# Patient Record
Sex: MALE | Race: WHITE | Employment: OTHER | URBAN - NONMETROPOLITAN AREA
[De-identification: names, ages, dates, MRNs, and addresses within clinical notes are randomized per-mention and may not be internally consistent; named-entity substitution may affect disease eponyms.]

---

## 2019-08-26 RX ORDER — TAMSULOSIN HYDROCHLORIDE 0.4 MG/1
1 CAPSULE ORAL DAILY
COMMUNITY
Start: 2015-07-13 | End: 2019-08-26 | Stop reason: SDUPTHER

## 2019-08-26 RX ORDER — TAMSULOSIN HYDROCHLORIDE 0.4 MG/1
0.4 CAPSULE ORAL DAILY
Qty: 30 CAPSULE | Refills: 1 | Status: SHIPPED | OUTPATIENT
Start: 2019-08-26 | End: 2019-09-24 | Stop reason: SDUPTHER

## 2019-09-19 RX ORDER — MELOXICAM 15 MG/1
TABLET ORAL
Qty: 14 TABLET | Refills: 0 | Status: SHIPPED | OUTPATIENT
Start: 2019-09-19 | End: 2019-09-24 | Stop reason: SDUPTHER

## 2019-09-24 ENCOUNTER — OFFICE VISIT (OUTPATIENT)
Dept: FAMILY MEDICINE CLINIC | Age: 69
End: 2019-09-24
Payer: MEDICARE

## 2019-09-24 ENCOUNTER — HOSPITAL ENCOUNTER (OUTPATIENT)
Age: 69
Discharge: HOME | End: 2019-09-26
Payer: MEDICARE

## 2019-09-24 VITALS
OXYGEN SATURATION: 99 % | DIASTOLIC BLOOD PRESSURE: 72 MMHG | SYSTOLIC BLOOD PRESSURE: 128 MMHG | HEART RATE: 80 BPM | HEIGHT: 70 IN | WEIGHT: 225 LBS | BODY MASS INDEX: 32.21 KG/M2

## 2019-09-24 DIAGNOSIS — Z91.81 AT HIGH RISK FOR FALLS: ICD-10-CM

## 2019-09-24 DIAGNOSIS — Z12.5 SCREENING FOR PROSTATE CANCER: ICD-10-CM

## 2019-09-24 DIAGNOSIS — Z12.11 SCREEN FOR COLON CANCER: ICD-10-CM

## 2019-09-24 DIAGNOSIS — N40.1 BENIGN PROSTATIC HYPERPLASIA WITH URINARY FREQUENCY: ICD-10-CM

## 2019-09-24 DIAGNOSIS — R35.0 BENIGN PROSTATIC HYPERPLASIA WITH URINARY FREQUENCY: ICD-10-CM

## 2019-09-24 DIAGNOSIS — Z23 IMMUNIZATION DUE: ICD-10-CM

## 2019-09-24 DIAGNOSIS — M19.279 SECONDARY LOCALIZED OSTEOARTHROSIS OF ANKLE AND FOOT, UNSPECIFIED LATERALITY: Primary | ICD-10-CM

## 2019-09-24 DIAGNOSIS — E78.49 OTHER HYPERLIPIDEMIA: ICD-10-CM

## 2019-09-24 DIAGNOSIS — M25.519 ARTHRALGIA OF SHOULDER, UNSPECIFIED LATERALITY: ICD-10-CM

## 2019-09-24 DIAGNOSIS — M19.279 SECONDARY LOCALIZED OSTEOARTHROSIS OF ANKLE AND FOOT, UNSPECIFIED LATERALITY: ICD-10-CM

## 2019-09-24 PROBLEM — B35.1 ONYCHOMYCOSIS: Status: ACTIVE | Noted: 2018-07-09

## 2019-09-24 PROBLEM — R26.2 DISABILITY OF WALKING: Status: ACTIVE | Noted: 2017-05-04

## 2019-09-24 PROBLEM — M79.609 PAIN IN LIMB: Status: ACTIVE | Noted: 2018-07-09

## 2019-09-24 PROBLEM — M25.579 PAIN IN JOINT INVOLVING ANKLE AND FOOT: Status: ACTIVE | Noted: 2017-05-04

## 2019-09-24 PROBLEM — I73.9 PERIPHERAL VASCULAR DISEASE (HCC): Status: ACTIVE | Noted: 2018-07-09

## 2019-09-24 LAB
ALBUMIN SERPL-MCNC: 4.5 G/DL (ref 3.5–5.2)
ALP BLD-CCNC: 81 U/L (ref 40–129)
ALT SERPL-CCNC: 16 U/L (ref 0–40)
ANION GAP SERPL CALCULATED.3IONS-SCNC: 15 MMOL/L (ref 7–16)
AST SERPL-CCNC: 17 U/L (ref 0–39)
BASOPHILS ABSOLUTE: 0.02 E9/L (ref 0–0.2)
BASOPHILS RELATIVE PERCENT: 0.3 % (ref 0–2)
BILIRUB SERPL-MCNC: 0.7 MG/DL (ref 0–1.2)
BILIRUBIN, POC: NORMAL
BLOOD URINE, POC: NORMAL
BUN BLDV-MCNC: 17 MG/DL (ref 8–23)
CALCIUM SERPL-MCNC: 9.5 MG/DL (ref 8.6–10.2)
CHLORIDE BLD-SCNC: 102 MMOL/L (ref 98–107)
CHOLESTEROL, TOTAL: 240 MG/DL (ref 0–199)
CLARITY, POC: CLEAR
CO2: 26 MMOL/L (ref 22–29)
COLOR, POC: NORMAL
CREAT SERPL-MCNC: 0.8 MG/DL (ref 0.7–1.2)
EOSINOPHILS ABSOLUTE: 0.06 E9/L (ref 0.05–0.5)
EOSINOPHILS RELATIVE PERCENT: 0.8 % (ref 0–6)
GFR AFRICAN AMERICAN: >60
GFR NON-AFRICAN AMERICAN: >60 ML/MIN/1.73
GLUCOSE BLD-MCNC: 119 MG/DL (ref 74–99)
GLUCOSE URINE, POC: NORMAL
HCT VFR BLD CALC: 45.5 % (ref 37–54)
HDLC SERPL-MCNC: 52 MG/DL
HEMOGLOBIN: 15 G/DL (ref 12.5–16.5)
IMMATURE GRANULOCYTES #: 0.02 E9/L
IMMATURE GRANULOCYTES %: 0.3 % (ref 0–5)
KETONES, POC: NORMAL
LDL CHOLESTEROL CALCULATED: 162 MG/DL (ref 0–99)
LEUKOCYTE EST, POC: NORMAL
LYMPHOCYTES ABSOLUTE: 1.5 E9/L (ref 1.5–4)
LYMPHOCYTES RELATIVE PERCENT: 20.7 % (ref 20–42)
MCH RBC QN AUTO: 29.8 PG (ref 26–35)
MCHC RBC AUTO-ENTMCNC: 33 % (ref 32–34.5)
MCV RBC AUTO: 90.3 FL (ref 80–99.9)
MONOCYTES ABSOLUTE: 0.39 E9/L (ref 0.1–0.95)
MONOCYTES RELATIVE PERCENT: 5.4 % (ref 2–12)
NEUTROPHILS ABSOLUTE: 5.27 E9/L (ref 1.8–7.3)
NEUTROPHILS RELATIVE PERCENT: 72.5 % (ref 43–80)
NITRITE, POC: NORMAL
PDW BLD-RTO: 12.4 FL (ref 11.5–15)
PH, POC: 5
PLATELET # BLD: 266 E9/L (ref 130–450)
PMV BLD AUTO: 10 FL (ref 7–12)
POTASSIUM SERPL-SCNC: 4 MMOL/L (ref 3.5–5)
PROSTATE SPECIFIC ANTIGEN: 2.26 NG/ML (ref 0–4)
PROTEIN, POC: NORMAL
RBC # BLD: 5.04 E12/L (ref 3.8–5.8)
SODIUM BLD-SCNC: 143 MMOL/L (ref 132–146)
SPECIFIC GRAVITY, POC: 1.02
TOTAL PROTEIN: 7.7 G/DL (ref 6.4–8.3)
TRIGL SERPL-MCNC: 128 MG/DL (ref 0–149)
TSH SERPL DL<=0.05 MIU/L-ACNC: 1.33 UIU/ML (ref 0.27–4.2)
URIC ACID, SERUM: 6.4 MG/DL (ref 3.4–7)
UROBILINOGEN, POC: NORMAL
VLDLC SERPL CALC-MCNC: 26 MG/DL
WBC # BLD: 7.3 E9/L (ref 4.5–11.5)

## 2019-09-24 PROCEDURE — G0103 PSA SCREENING: HCPCS

## 2019-09-24 PROCEDURE — 99214 OFFICE O/P EST MOD 30 MIN: CPT | Performed by: FAMILY MEDICINE

## 2019-09-24 PROCEDURE — 3017F COLORECTAL CA SCREEN DOC REV: CPT | Performed by: FAMILY MEDICINE

## 2019-09-24 PROCEDURE — 85025 COMPLETE CBC W/AUTO DIFF WBC: CPT

## 2019-09-24 PROCEDURE — 4004F PT TOBACCO SCREEN RCVD TLK: CPT | Performed by: FAMILY MEDICINE

## 2019-09-24 PROCEDURE — 80053 COMPREHEN METABOLIC PANEL: CPT

## 2019-09-24 PROCEDURE — 36415 COLL VENOUS BLD VENIPUNCTURE: CPT

## 2019-09-24 PROCEDURE — 4040F PNEUMOC VAC/ADMIN/RCVD: CPT | Performed by: FAMILY MEDICINE

## 2019-09-24 PROCEDURE — G8417 CALC BMI ABV UP PARAM F/U: HCPCS | Performed by: FAMILY MEDICINE

## 2019-09-24 PROCEDURE — G0009 ADMIN PNEUMOCOCCAL VACCINE: HCPCS | Performed by: FAMILY MEDICINE

## 2019-09-24 PROCEDURE — 80061 LIPID PANEL: CPT

## 2019-09-24 PROCEDURE — 84443 ASSAY THYROID STIM HORMONE: CPT

## 2019-09-24 PROCEDURE — G8427 DOCREV CUR MEDS BY ELIG CLIN: HCPCS | Performed by: FAMILY MEDICINE

## 2019-09-24 PROCEDURE — 1123F ACP DISCUSS/DSCN MKR DOCD: CPT | Performed by: FAMILY MEDICINE

## 2019-09-24 PROCEDURE — 90670 PCV13 VACCINE IM: CPT | Performed by: FAMILY MEDICINE

## 2019-09-24 PROCEDURE — 84550 ASSAY OF BLOOD/URIC ACID: CPT

## 2019-09-24 PROCEDURE — 81002 URINALYSIS NONAUTO W/O SCOPE: CPT | Performed by: FAMILY MEDICINE

## 2019-09-24 RX ORDER — BUPROPION HYDROCHLORIDE 150 MG/1
150 TABLET, EXTENDED RELEASE ORAL 2 TIMES DAILY
COMMUNITY
End: 2021-01-12

## 2019-09-24 RX ORDER — AMPICILLIN TRIHYDRATE 250 MG
CAPSULE ORAL
COMMUNITY

## 2019-09-24 RX ORDER — ESCITALOPRAM OXALATE 20 MG/1
20 TABLET ORAL DAILY
COMMUNITY
End: 2021-01-12

## 2019-09-24 RX ORDER — MELOXICAM 15 MG/1
15 TABLET ORAL DAILY
Qty: 90 TABLET | Refills: 1 | Status: SHIPPED
Start: 2019-09-24 | End: 2020-04-02 | Stop reason: SDUPTHER

## 2019-09-24 RX ORDER — TAMSULOSIN HYDROCHLORIDE 0.4 MG/1
0.4 CAPSULE ORAL DAILY
Qty: 90 CAPSULE | Refills: 1 | Status: SHIPPED
Start: 2019-09-24 | End: 2020-04-02 | Stop reason: SDUPTHER

## 2019-09-24 ASSESSMENT — ENCOUNTER SYMPTOMS
CONSTIPATION: 0
VOMITING: 0
COUGH: 0
BLOOD IN STOOL: 0
ABDOMINAL PAIN: 0
SINUS PAIN: 0
EYE REDNESS: 0
TROUBLE SWALLOWING: 0
WHEEZING: 0
BACK PAIN: 0
DIARRHEA: 0
PHOTOPHOBIA: 0
SHORTNESS OF BREATH: 0
SORE THROAT: 0

## 2019-09-24 ASSESSMENT — PATIENT HEALTH QUESTIONNAIRE - PHQ9
SUM OF ALL RESPONSES TO PHQ QUESTIONS 1-9: 2
SUM OF ALL RESPONSES TO PHQ QUESTIONS 1-9: 2
2. FEELING DOWN, DEPRESSED OR HOPELESS: 1
1. LITTLE INTEREST OR PLEASURE IN DOING THINGS: 1
SUM OF ALL RESPONSES TO PHQ9 QUESTIONS 1 & 2: 2

## 2019-09-24 NOTE — PROGRESS NOTES
OFFICE NOTE    9/24/19  Name: Kaylene Pandya  MDS:[id number]   Sex:male   Age:69 y.o. SUBJECTIVE  Chief Complaint   Patient presents with    Anxiety    Fatigue       HPI comes in says he took Welbutrin for a mos and decided it was not helping him. Says he can do things outside but can't face his garage. Says [place] can't do much he feels homebound sometimes. Has been golfing some  Review of Systems   Constitutional: Positive for fatigue. Negative for appetite change, fever and unexpected weight change. HENT: Negative for congestion, ear pain, hearing loss, sinus pain, sore throat and trouble swallowing. Eyes: Negative for photophobia, redness and visual disturbance. Respiratory: Negative for cough, shortness of breath and wheezing. Cardiovascular: Negative for chest pain, palpitations and leg swelling. Gastrointestinal: Negative for abdominal pain, blood in stool, constipation, diarrhea and vomiting. Endocrine: Negative for cold intolerance, polydipsia and polyuria. Genitourinary: Positive for urgency. Negative for difficulty urinating, genital sores and hematuria. Some post void dribbling   Musculoskeletal: Positive for arthralgias. Negative for back pain and joint swelling. Skin: Negative for rash and wound. Allergic/Immunologic: Negative for food allergies. Neurological: Negative for dizziness, tremors, syncope and headaches. Hematological: Negative for adenopathy. Does not bruise/bleed easily. Psychiatric/Behavioral: Negative for agitation, dysphoric mood, hallucinations and sleep disturbance.             Current Outpatient Medications:     tamsulosin (FLOMAX) 0.4 MG capsule, Take 1 capsule by mouth daily, Disp: 90 capsule, Rfl: 1    meloxicam (MOBIC) 15 MG tablet, Take 1 tablet by mouth daily, Disp: 90 tablet, Rfl: 1    buPROPion (WELLBUTRIN SR) 150 MG extended release tablet, Take 150 mg by mouth 2 times daily, Disp: , Rfl:     albuterol sulfate (PROAIR RESPICLICK) 108 (90 Base) MCG/ACT aerosol powder inhalation, Inhale into the lungs, Disp: , Rfl:     escitalopram (LEXAPRO) 20 MG tablet, Take 20 mg by mouth daily, Disp: , Rfl:     Red Yeast Rice 600 MG CAPS, Take by mouth, Disp: , Rfl:     Multiple Vitamins-Minerals (CENTRUM SILVER 50+MEN) TABS, Take by mouth, Disp: , Rfl:   Allergies   Allergen Reactions    Atorvastatin Calcium Other (See Comments)     Leg cramps      Ezetimibe-Simvastatin Other (See Comments)     Leg pain/cramping       No past medical history on file. No past surgical history on file. No family history on file. Social History     Tobacco History     Smoking Status  Current Some Day Smoker Smoking Frequency  0 packs/day for 53 years (0 pk yrs) Smoking Tobacco Type  Cigars    Smokeless Tobacco Use  Never Used          Alcohol History     Alcohol Use Status  Not Asked          Drug Use     Drug Use Status  Not Asked          Sexual Activity     Sexually Active  Not Asked                OBJECTIVE  Vitals:    09/24/19 0931   BP: 128/72   Pulse: 80   SpO2: 99%   Weight: 225 lb (102.1 kg)   Height: 5' 9.5\" (1.765 m)        Body mass index is 32.75 kg/m².     Orders Placed This Encounter   Procedures    PREVNAR 13 IM (Pneumococcal conjugate vaccine 13-valent)    CBC Auto Differential     Standing Status:   Future     Number of Occurrences:   1     Standing Expiration Date:   9/24/2020    Comprehensive Metabolic Panel     Standing Status:   Future     Number of Occurrences:   1     Standing Expiration Date:   9/24/2020    Lipid Panel     Standing Status:   Future     Number of Occurrences:   1     Standing Expiration Date:   9/24/2020     Order Specific Question:   Is Patient Fasting?/# of Hours     Answer:   fasting    TSH without Reflex     Standing Status:   Future     Number of Occurrences:   1     Standing Expiration Date:   9/24/2020    PSA SCREENING     Standing Status:   Future     Number of Occurrences:   1     Standing Expiration Date: 9/24/2020    Uric Acid     Standing Status:   Future     Number of Occurrences:   1     Standing Expiration Date:   9/24/2020    POCT Urinalysis no Micro    POCT Fit Test     Standing Status:   Future     Standing Expiration Date:   9/24/2020        EXAM   Physical Exam   Constitutional: He is oriented to person, place, and time. He appears well-developed. overweight   HENT:   Right Ear: Tympanic membrane, external ear and ear canal normal.   Left Ear: Tympanic membrane, external ear and ear canal normal.   Nose: Nose normal.   Mouth/Throat: Oropharynx is clear and moist. No oropharyngeal exudate. Eyes: Pupils are equal, round, and reactive to light. Conjunctivae are normal. No scleral icterus. Neck: Trachea normal. Neck supple. No JVD present. No thyroid mass and no thyromegaly present. Cardiovascular: Normal rate, regular rhythm, normal heart sounds and intact distal pulses. Exam reveals no gallop. No murmur heard. Pulmonary/Chest: Effort normal and breath sounds normal. He has no wheezes. He has no rales. He exhibits no tenderness. Abdominal: Soft. Bowel sounds are normal. He exhibits no distension and no mass. There is no tenderness. There is no guarding. No hernia. Musculoskeletal: Normal range of motion. He exhibits no tenderness. Some OA in knees   Lymphadenopathy:     He has no cervical adenopathy. Neurological: He is alert and oriented to person, place, and time. He exhibits normal muscle tone. Skin: Skin is warm and dry. Capillary refill takes less than 2 seconds. No rash noted. Psychiatric: He has a normal mood and affect. His behavior is normal.   Vitals reviewed. [facility] was seen today for anxiety and fatigue. Diagnoses and all orders for this visit:    Secondary localized osteoarthrosis of ankle and foot, unspecified laterality  -     meloxicam (MOBIC) 15 MG tablet;  Take 1 tablet by mouth daily  -     CBC Auto Differential; Future  -     Comprehensive Metabolic Panel;

## 2020-01-16 ENCOUNTER — OFFICE VISIT (OUTPATIENT)
Dept: FAMILY MEDICINE CLINIC | Age: 70
End: 2020-01-16
Payer: MEDICARE

## 2020-01-16 VITALS
BODY MASS INDEX: 34.27 KG/M2 | SYSTOLIC BLOOD PRESSURE: 126 MMHG | OXYGEN SATURATION: 97 % | HEART RATE: 66 BPM | TEMPERATURE: 97.4 F | HEIGHT: 69 IN | WEIGHT: 231.4 LBS | DIASTOLIC BLOOD PRESSURE: 78 MMHG

## 2020-01-16 PROCEDURE — G8417 CALC BMI ABV UP PARAM F/U: HCPCS | Performed by: FAMILY MEDICINE

## 2020-01-16 PROCEDURE — 1123F ACP DISCUSS/DSCN MKR DOCD: CPT | Performed by: FAMILY MEDICINE

## 2020-01-16 PROCEDURE — 17000 DESTRUCT PREMALG LESION: CPT | Performed by: FAMILY MEDICINE

## 2020-01-16 PROCEDURE — 4040F PNEUMOC VAC/ADMIN/RCVD: CPT | Performed by: FAMILY MEDICINE

## 2020-01-16 PROCEDURE — 3017F COLORECTAL CA SCREEN DOC REV: CPT | Performed by: FAMILY MEDICINE

## 2020-01-16 PROCEDURE — 99214 OFFICE O/P EST MOD 30 MIN: CPT | Performed by: FAMILY MEDICINE

## 2020-01-16 PROCEDURE — G8427 DOCREV CUR MEDS BY ELIG CLIN: HCPCS | Performed by: FAMILY MEDICINE

## 2020-01-16 PROCEDURE — G8484 FLU IMMUNIZE NO ADMIN: HCPCS | Performed by: FAMILY MEDICINE

## 2020-01-16 PROCEDURE — 4004F PT TOBACCO SCREEN RCVD TLK: CPT | Performed by: FAMILY MEDICINE

## 2020-01-16 ASSESSMENT — ENCOUNTER SYMPTOMS
COUGH: 0
NAUSEA: 0
PHOTOPHOBIA: 0
BLOOD IN STOOL: 0
EYE REDNESS: 0
SHORTNESS OF BREATH: 0
WHEEZING: 0
ABDOMINAL PAIN: 0

## 2020-01-16 ASSESSMENT — PATIENT HEALTH QUESTIONNAIRE - PHQ9
SUM OF ALL RESPONSES TO PHQ QUESTIONS 1-9: 0
SUM OF ALL RESPONSES TO PHQ QUESTIONS 1-9: 0
SUM OF ALL RESPONSES TO PHQ9 QUESTIONS 1 & 2: 0
1. LITTLE INTEREST OR PLEASURE IN DOING THINGS: 0
2. FEELING DOWN, DEPRESSED OR HOPELESS: 0

## 2020-01-16 NOTE — PROGRESS NOTES
No past medical history on file. No past surgical history on file. No family history on file. Social History     Tobacco History     Smoking Status  Current Some Day Smoker Smoking Frequency  0 packs/day for 53 years (0 pk yrs) Smoking Tobacco Type  Cigars    Smokeless Tobacco Use  Never Used          Alcohol History     Alcohol Use Status  Not Currently          Drug Use     Drug Use Status  Never          Sexual Activity     Sexually Active  Not Currently Partners  Female                OBJECTIVE  Vitals:    01/16/20 1427   BP: 126/78   Site: Left Upper Arm   Position: Sitting   Pulse: 66   Temp: 97.4 °F (36.3 °C)   TempSrc: Temporal   SpO2: 97%   Weight: 231 lb 6.4 oz (105 kg)   Height: 5' 9\" (1.753 m)        Body mass index is 34.17 kg/m². Orders Placed This Encounter   Procedures    [address] -  [facility]        EXAM   Physical Exam  Vitals signs and nursing note reviewed. Constitutional:       Appearance: Normal appearance. He is obese. HENT:      Right Ear: Tympanic membrane normal.      Left Ear: Tympanic membrane normal.      Nose: No congestion or rhinorrhea. Mouth/Throat:      Pharynx: Oropharynx is clear. No posterior oropharyngeal erythema. Eyes:      General: No scleral icterus. Conjunctiva/sclera: Conjunctivae normal.      Pupils: Pupils are equal, round, and reactive to light. Neck:      Musculoskeletal: Normal range of motion. Vascular: No carotid bruit. Cardiovascular:      Rate and Rhythm: Normal rate. Pulses: Normal pulses. Heart sounds: No murmur. Pulmonary:      Effort: Pulmonary effort is normal.      Breath sounds: No wheezing or rhonchi. Abdominal:      General: Bowel sounds are normal.      Tenderness: There is no tenderness. Hernia: No hernia is present. Musculoskeletal:         General: No swelling or tenderness. Right lower leg: No edema. Left lower leg: No edema.    Lymphadenopathy:      Cervical: No cervical adenopathy. Skin:     Capillary Refill: Capillary refill takes less than 2 seconds. Coloration: Skin is not jaundiced. Findings: Lesion present. No bruising or rash. Comments: AK upper left back (1) and left lateral chest (2)   Neurological:      General: No focal deficit present. Mental Status: He is alert. Psychiatric:         Mood and Affect: Mood normal.           Tammie De La Garza was seen today for pre-op exam.    Diagnoses and all orders for this visit:    Actinic keratoses  -     96365 - HI DESTRUC PREMALIGNANT, FIRST LESION  Cryo 3 lesions (AKs)with verucca-freeze  Preop general physical exam  Reviewed labs and EKG cleared medically for planned cataract surgery        No follow-ups on file.     Electronically signed by Kassandra Perez MD on 1/16/20 at 3:11 PM

## 2020-04-02 RX ORDER — TAMSULOSIN HYDROCHLORIDE 0.4 MG/1
0.4 CAPSULE ORAL DAILY
Qty: 90 CAPSULE | Refills: 1 | Status: SHIPPED
Start: 2020-04-02 | End: 2020-09-15 | Stop reason: SDUPTHER

## 2020-04-02 RX ORDER — MELOXICAM 15 MG/1
15 TABLET ORAL DAILY
Qty: 90 TABLET | Refills: 1 | Status: SHIPPED
Start: 2020-04-02 | End: 2020-08-11

## 2020-05-12 ENCOUNTER — VIRTUAL VISIT (OUTPATIENT)
Dept: FAMILY MEDICINE CLINIC | Age: 70
End: 2020-05-12
Payer: MEDICARE

## 2020-05-12 PROCEDURE — 99442 PR PHYS/QHP TELEPHONE EVALUATION 11-20 MIN: CPT | Performed by: FAMILY MEDICINE

## 2020-05-12 RX ORDER — ALBUTEROL SULFATE 90 UG/1
2 AEROSOL, METERED RESPIRATORY (INHALATION) 4 TIMES DAILY PRN
Qty: 1 INHALER | Refills: 5 | Status: SHIPPED
Start: 2020-05-12 | End: 2021-05-11

## 2020-05-21 ENCOUNTER — OFFICE VISIT (OUTPATIENT)
Dept: FAMILY MEDICINE CLINIC | Age: 70
End: 2020-05-21
Payer: MEDICARE

## 2020-05-21 VITALS
TEMPERATURE: 98.3 F | HEART RATE: 104 BPM | DIASTOLIC BLOOD PRESSURE: 82 MMHG | OXYGEN SATURATION: 97 % | SYSTOLIC BLOOD PRESSURE: 138 MMHG

## 2020-05-21 PROBLEM — F41.9 ANXIETY: Status: ACTIVE | Noted: 2020-05-21

## 2020-05-21 PROCEDURE — G8427 DOCREV CUR MEDS BY ELIG CLIN: HCPCS | Performed by: FAMILY MEDICINE

## 2020-05-21 PROCEDURE — 20552 NJX 1/MLT TRIGGER POINT 1/2: CPT | Performed by: FAMILY MEDICINE

## 2020-05-21 PROCEDURE — 1123F ACP DISCUSS/DSCN MKR DOCD: CPT | Performed by: FAMILY MEDICINE

## 2020-05-21 PROCEDURE — 99213 OFFICE O/P EST LOW 20 MIN: CPT | Performed by: FAMILY MEDICINE

## 2020-05-21 PROCEDURE — 4004F PT TOBACCO SCREEN RCVD TLK: CPT | Performed by: FAMILY MEDICINE

## 2020-05-21 PROCEDURE — G8417 CALC BMI ABV UP PARAM F/U: HCPCS | Performed by: FAMILY MEDICINE

## 2020-05-21 PROCEDURE — 3017F COLORECTAL CA SCREEN DOC REV: CPT | Performed by: FAMILY MEDICINE

## 2020-05-21 PROCEDURE — 4040F PNEUMOC VAC/ADMIN/RCVD: CPT | Performed by: FAMILY MEDICINE

## 2020-05-21 RX ORDER — HYDROCODONE BITARTRATE AND ACETAMINOPHEN 5; 325 MG/1; MG/1
TABLET ORAL
COMMUNITY
Start: 2020-05-19 | End: 2020-06-08

## 2020-05-21 NOTE — PROGRESS NOTES
OFFICE NOTE    5/21/20  Name: David Henry  SMN:[id number]   Sex:male   Age:69 y.o. SUBJECTIVE  Chief Complaint   Patient presents with    Back Pain     [facility] 5/19, Dx Lumbar radiculopathy, given Norco and Flexeril, shooting into BLE       HPI  [place] something go when was lifting hanging plants. Does most of \"step-and fetch-it\" tasks at home due to wife's OA of knees. No falls or bowel or bladder incontinance    Review of Systems   Constitutional: Positive for fatigue. Negative for appetite change, fever and unexpected weight change. HENT: Negative for congestion, ear pain, hearing loss, sinus pain, sore throat and trouble swallowing. Eyes: Negative for photophobia, redness and visual disturbance. Respiratory: Negative for cough, shortness of breath and wheezing. Cardiovascular: Negative for chest pain, palpitations and leg swelling. Gastrointestinal: Negative for abdominal pain, blood in stool, constipation, diarrhea and vomiting. Endocrine: Negative for cold intolerance, polydipsia and polyuria. Genitourinary: Negative for difficulty urinating, genital sores, hematuria and urgency. Musculoskeletal: Positive for back pain and gait problem. Negative for arthralgias and joint swelling. Allergic/Immunologic: Negative for food allergies. Neurological: Negative for dizziness, tremors, syncope and headaches. Radicular pain bilaterally   Hematological: Negative for adenopathy. Does not bruise/bleed easily. Psychiatric/Behavioral: Negative for agitation, dysphoric mood, hallucinations and sleep disturbance. The patient is nervous/anxious.              Current Outpatient Medications:     HYDROcodone-acetaminophen (NORCO) 5-325 MG per tablet, , Disp: , Rfl:     albuterol sulfate HFA (VENTOLIN HFA) 108 (90 Base) MCG/ACT inhaler, Inhale 2 puffs into the lungs 4 times daily as needed for Wheezing, Disp: 1 Inhaler, Rfl: 5    meloxicam (MOBIC) 15 MG tablet, Take 1 tablet by mouth daily, Disp: 90 tablet, Rfl: 1    tamsulosin (FLOMAX) 0.4 MG capsule, Take 1 capsule by mouth daily, Disp: 90 capsule, Rfl: 1    buPROPion (WELLBUTRIN SR) 150 MG extended release tablet, Take 150 mg by mouth 2 times daily, Disp: , Rfl:     albuterol sulfate (PROAIR RESPICLICK) 916 (90 Base) MCG/ACT aerosol powder inhalation, Inhale into the lungs, Disp: , Rfl:     escitalopram (LEXAPRO) 20 MG tablet, Take 20 mg by mouth daily, Disp: , Rfl:     Red Yeast Rice 600 MG CAPS, Take by mouth, Disp: , Rfl:     Multiple Vitamins-Minerals (CENTRUM SILVER 50+MEN) TABS, Take by mouth, Disp: , Rfl:   Allergies   Allergen Reactions    Atorvastatin Calcium Other (See Comments)     Leg cramps      Ezetimibe-Simvastatin Other (See Comments)     Leg pain/cramping       No past medical history on file. No past surgical history on file. No family history on file. Social History     Tobacco History     Smoking Status  Current Some Day Smoker Smoking Frequency  0 packs/day for 53 years (0 pk yrs) Smoking Tobacco Type  Cigars    Smokeless Tobacco Use  Never Used          Alcohol History     Alcohol Use Status  Not Currently          Drug Use     Drug Use Status  Never          Sexual Activity     Sexually Active  Not Currently Partners  Female                OBJECTIVE  Vitals:    05/21/20 1540   BP: 138/82   Site: Left Upper Arm   Position: Sitting   Pulse: 104   Temp: 98.3 °F (36.8 °C)   TempSrc: Temporal   SpO2: 97%        There is no height or weight on file to calculate BMI. No orders of the defined types were placed in this encounter. EXAM   Physical Exam  Vitals signs and nursing note reviewed. Constitutional:       Appearance: Normal appearance. He is obese. HENT:      Right Ear: Tympanic membrane normal.      Left Ear: Tympanic membrane normal.      Mouth/Throat:      Pharynx: Oropharynx is clear. Eyes:      Conjunctiva/sclera: Conjunctivae normal.      Pupils: Pupils are equal, round, and reactive to light.

## 2020-05-22 RX ORDER — METHYLPREDNISOLONE ACETATE 40 MG/ML
40 INJECTION, SUSPENSION INTRA-ARTICULAR; INTRALESIONAL; INTRAMUSCULAR; SOFT TISSUE ONCE
Status: DISCONTINUED | OUTPATIENT
Start: 2020-05-23 | End: 2020-09-15

## 2020-05-22 RX ORDER — LIDOCAINE HYDROCHLORIDE 10 MG/ML
20 INJECTION, SOLUTION INFILTRATION; PERINEURAL ONCE
Status: DISCONTINUED | OUTPATIENT
Start: 2020-05-23 | End: 2020-09-15

## 2020-05-22 ASSESSMENT — ENCOUNTER SYMPTOMS
BACK PAIN: 1
CONSTIPATION: 0
SORE THROAT: 0
ABDOMINAL PAIN: 0
BLOOD IN STOOL: 0
PHOTOPHOBIA: 0
SINUS PAIN: 0
TROUBLE SWALLOWING: 0
DIARRHEA: 0
SHORTNESS OF BREATH: 0
EYE REDNESS: 0
COUGH: 0
WHEEZING: 0
VOMITING: 0

## 2020-06-01 ENCOUNTER — TELEPHONE (OUTPATIENT)
Dept: FAMILY MEDICINE CLINIC | Age: 70
End: 2020-06-01

## 2020-06-01 RX ORDER — OXYCODONE HYDROCHLORIDE AND ACETAMINOPHEN 5; 325 MG/1; MG/1
TABLET ORAL
COMMUNITY
Start: 2020-05-26 | End: 2020-06-08

## 2020-06-01 NOTE — TELEPHONE ENCOUNTER
Patient calling in, continues with back pain. Out of flexeril. Asking for pain medication to be sent in. Has percocet from ER visit last week as well. Went to ER x2, saw you and had SI injection all in one week. Please advise.

## 2020-06-08 ENCOUNTER — OFFICE VISIT (OUTPATIENT)
Dept: FAMILY MEDICINE CLINIC | Age: 70
End: 2020-06-08
Payer: MEDICARE

## 2020-06-08 VITALS
DIASTOLIC BLOOD PRESSURE: 94 MMHG | SYSTOLIC BLOOD PRESSURE: 166 MMHG | OXYGEN SATURATION: 97 % | TEMPERATURE: 97.6 F | HEART RATE: 126 BPM

## 2020-06-08 PROCEDURE — G8427 DOCREV CUR MEDS BY ELIG CLIN: HCPCS | Performed by: FAMILY MEDICINE

## 2020-06-08 PROCEDURE — G8417 CALC BMI ABV UP PARAM F/U: HCPCS | Performed by: FAMILY MEDICINE

## 2020-06-08 PROCEDURE — 3017F COLORECTAL CA SCREEN DOC REV: CPT | Performed by: FAMILY MEDICINE

## 2020-06-08 PROCEDURE — 4004F PT TOBACCO SCREEN RCVD TLK: CPT | Performed by: FAMILY MEDICINE

## 2020-06-08 PROCEDURE — 4040F PNEUMOC VAC/ADMIN/RCVD: CPT | Performed by: FAMILY MEDICINE

## 2020-06-08 PROCEDURE — 99213 OFFICE O/P EST LOW 20 MIN: CPT | Performed by: FAMILY MEDICINE

## 2020-06-08 PROCEDURE — 1123F ACP DISCUSS/DSCN MKR DOCD: CPT | Performed by: FAMILY MEDICINE

## 2020-06-08 RX ORDER — LORAZEPAM 1 MG/1
TABLET ORAL
Qty: 4 TABLET | Refills: 0 | Status: SHIPPED | OUTPATIENT
Start: 2020-06-08 | End: 2020-07-08

## 2020-06-25 ENCOUNTER — OFFICE VISIT (OUTPATIENT)
Dept: FAMILY MEDICINE CLINIC | Age: 70
End: 2020-06-25
Payer: MEDICARE

## 2020-06-25 VITALS
HEART RATE: 95 BPM | SYSTOLIC BLOOD PRESSURE: 162 MMHG | OXYGEN SATURATION: 97 % | TEMPERATURE: 97.7 F | DIASTOLIC BLOOD PRESSURE: 92 MMHG

## 2020-06-25 PROCEDURE — G8417 CALC BMI ABV UP PARAM F/U: HCPCS | Performed by: FAMILY MEDICINE

## 2020-06-25 PROCEDURE — 4004F PT TOBACCO SCREEN RCVD TLK: CPT | Performed by: FAMILY MEDICINE

## 2020-06-25 PROCEDURE — 4040F PNEUMOC VAC/ADMIN/RCVD: CPT | Performed by: FAMILY MEDICINE

## 2020-06-25 PROCEDURE — 3017F COLORECTAL CA SCREEN DOC REV: CPT | Performed by: FAMILY MEDICINE

## 2020-06-25 PROCEDURE — G8427 DOCREV CUR MEDS BY ELIG CLIN: HCPCS | Performed by: FAMILY MEDICINE

## 2020-06-25 PROCEDURE — 99213 OFFICE O/P EST LOW 20 MIN: CPT | Performed by: FAMILY MEDICINE

## 2020-06-25 PROCEDURE — 1123F ACP DISCUSS/DSCN MKR DOCD: CPT | Performed by: FAMILY MEDICINE

## 2020-06-25 ASSESSMENT — ENCOUNTER SYMPTOMS
SINUS PAIN: 0
SORE THROAT: 0
COUGH: 0
VOMITING: 0
PHOTOPHOBIA: 0
ABDOMINAL PAIN: 0
SHORTNESS OF BREATH: 0
BLOOD IN STOOL: 0
BACK PAIN: 1
WHEEZING: 0
CONSTIPATION: 0
EYE REDNESS: 0
DIARRHEA: 0
TROUBLE SWALLOWING: 0

## 2020-06-25 NOTE — PROGRESS NOTES
OFFICE NOTE    6/25/20  Name: Kirstin Clark  WXX:[birth year]   Sex:male   Age:69 y.o. SUBJECTIVE  Chief Complaint   Patient presents with    Results     MRI       Patient presents to discuss MRI results and treatment plan. Came in to discuss MRI. Says he finally went golfing. Has LBP and leg pain/fatigue if walks far or stands for more than 15 minuts      Review of Systems   Constitutional: Positive for fatigue. Negative for appetite change, fever and unexpected weight change. HENT: Negative for congestion, ear pain, hearing loss, sinus pain, sore throat and trouble swallowing. Eyes: Negative for photophobia, redness and visual disturbance. Respiratory: Negative for cough, shortness of breath and wheezing. Cardiovascular: Negative for chest pain, palpitations and leg swelling. Gastrointestinal: Negative for abdominal pain, blood in stool, constipation, diarrhea and vomiting. Endocrine: Negative for cold intolerance, polydipsia and polyuria. Genitourinary: Negative for difficulty urinating, genital sores, hematuria and urgency. Musculoskeletal: Positive for back pain and gait problem. Negative for arthralgias and joint swelling. Allergic/Immunologic: Negative for food allergies. Neurological: Negative for dizziness, tremors, seizures, syncope, numbness and headaches. Hematological: Negative for adenopathy. Does not bruise/bleed easily. Psychiatric/Behavioral: Negative for agitation, dysphoric mood, hallucinations and sleep disturbance.             Current Outpatient Medications:     LORazepam (ATIVAN) 1 MG tablet, 1 tab 1 hour before procedure like MRI, Disp: 4 tablet, Rfl: 0    albuterol sulfate HFA (VENTOLIN HFA) 108 (90 Base) MCG/ACT inhaler, Inhale 2 puffs into the lungs 4 times daily as needed for Wheezing, Disp: 1 Inhaler, Rfl: 5    meloxicam (MOBIC) 15 MG tablet, Take 1 tablet by mouth daily, Disp: 90 tablet, Rfl: 1    tamsulosin (FLOMAX) 0.4 MG capsule, Take 1 capsule

## 2020-06-29 ENCOUNTER — TELEPHONE (OUTPATIENT)
Dept: FAMILY MEDICINE CLINIC | Age: 70
End: 2020-06-29

## 2020-08-11 ENCOUNTER — OFFICE VISIT (OUTPATIENT)
Dept: FAMILY MEDICINE CLINIC | Age: 70
End: 2020-08-11
Payer: MEDICARE

## 2020-08-11 VITALS
WEIGHT: 230.4 LBS | OXYGEN SATURATION: 98 % | BODY MASS INDEX: 34.02 KG/M2 | DIASTOLIC BLOOD PRESSURE: 82 MMHG | TEMPERATURE: 97.7 F | HEART RATE: 90 BPM | SYSTOLIC BLOOD PRESSURE: 128 MMHG

## 2020-08-11 PROCEDURE — 3017F COLORECTAL CA SCREEN DOC REV: CPT | Performed by: FAMILY MEDICINE

## 2020-08-11 PROCEDURE — 4004F PT TOBACCO SCREEN RCVD TLK: CPT | Performed by: FAMILY MEDICINE

## 2020-08-11 PROCEDURE — 1123F ACP DISCUSS/DSCN MKR DOCD: CPT | Performed by: FAMILY MEDICINE

## 2020-08-11 PROCEDURE — 4040F PNEUMOC VAC/ADMIN/RCVD: CPT | Performed by: FAMILY MEDICINE

## 2020-08-11 PROCEDURE — G8427 DOCREV CUR MEDS BY ELIG CLIN: HCPCS | Performed by: FAMILY MEDICINE

## 2020-08-11 PROCEDURE — G8417 CALC BMI ABV UP PARAM F/U: HCPCS | Performed by: FAMILY MEDICINE

## 2020-08-11 PROCEDURE — 99214 OFFICE O/P EST MOD 30 MIN: CPT | Performed by: FAMILY MEDICINE

## 2020-08-11 RX ORDER — NAPROXEN 500 MG/1
500 TABLET ORAL 2 TIMES DAILY WITH MEALS
Qty: 60 TABLET | Refills: 3 | Status: SHIPPED
Start: 2020-08-11 | End: 2021-03-30

## 2020-08-11 RX ORDER — TIZANIDINE 4 MG/1
4 TABLET ORAL NIGHTLY PRN
Qty: 10 TABLET | Refills: 2 | Status: SHIPPED | OUTPATIENT
Start: 2020-08-11 | End: 2020-08-31

## 2020-08-11 RX ORDER — IBUPROFEN 400 MG/1
400 TABLET ORAL EVERY 6 HOURS PRN
COMMUNITY

## 2020-08-11 ASSESSMENT — ENCOUNTER SYMPTOMS
ABDOMINAL PAIN: 0
TROUBLE SWALLOWING: 0
WHEEZING: 0
CONSTIPATION: 0
COUGH: 0
EYE REDNESS: 0
SORE THROAT: 0
PHOTOPHOBIA: 0
BLOOD IN STOOL: 0
SINUS PAIN: 0
DIARRHEA: 0
SHORTNESS OF BREATH: 0
VOMITING: 0
BACK PAIN: 1

## 2020-08-11 NOTE — PROGRESS NOTES
OFFICE NOTE    8/11/20  Name: Parvez Rahcel  VRJ:[birth year]   Sex:male   Age:69 y.o. SUBJECTIVE  Chief Complaint   Patient presents with    Back Pain       Patient presents for continued back pain. Has been going to [employer] with no relief. Switched from mobic to OTC ibuprofen, was suggested he try Aleve.   taking 2 tabs probably TID, no muscle relaxant. Says got very little relief from initial set of shots. Reluctant to take 2nd set. Left knee and left ankle also problematic. Continues to golf, tires 13th hole. Wife handicapped        Review of Systems   Constitutional: Positive for fatigue. Negative for appetite change, fever and unexpected weight change. HENT: Negative for congestion, ear pain, hearing loss, sinus pain, sore throat and trouble swallowing. Eyes: Negative for photophobia, redness and visual disturbance. Respiratory: Negative for cough, shortness of breath and wheezing. Cardiovascular: Negative for chest pain, palpitations and leg swelling. Gastrointestinal: Negative for abdominal pain, blood in stool, constipation, diarrhea and vomiting. Endocrine: Negative for cold intolerance, polydipsia and polyuria. Genitourinary: Negative for difficulty urinating, genital sores, hematuria and urgency. Says tamsulosin seems to help him   Musculoskeletal: Positive for arthralgias, back pain and gait problem. Negative for joint swelling. Skin: Negative for rash and wound. Allergic/Immunologic: Negative for food allergies. Neurological: Negative for dizziness, tremors, seizures, syncope, numbness and headaches. Hematological: Negative for adenopathy. Does not bruise/bleed easily. Psychiatric/Behavioral: Positive for dysphoric mood. Negative for agitation, hallucinations and sleep disturbance. The patient is nervous/anxious.              Current Outpatient Medications:     ibuprofen (ADVIL;MOTRIN) 400 MG tablet, Take 400 mg by mouth every 6 hours as needed for Pain, Disp: , Rfl:     naproxen (NAPROSYN) 500 MG tablet, Take 1 tablet by mouth 2 times daily (with meals), Disp: 60 tablet, Rfl: 3    tiZANidine (ZANAFLEX) 4 MG tablet, Take 1 tablet by mouth nightly as needed (low back pain) Knee or back pain, Disp: 10 tablet, Rfl: 2    albuterol sulfate HFA (VENTOLIN HFA) 108 (90 Base) MCG/ACT inhaler, Inhale 2 puffs into the lungs 4 times daily as needed for Wheezing, Disp: 1 Inhaler, Rfl: 5    tamsulosin (FLOMAX) 0.4 MG capsule, Take 1 capsule by mouth daily, Disp: 90 capsule, Rfl: 1    buPROPion (WELLBUTRIN SR) 150 MG extended release tablet, Take 150 mg by mouth 2 times daily, Disp: , Rfl:     albuterol sulfate (PROAIR RESPICLICK) 926 (90 Base) MCG/ACT aerosol powder inhalation, Inhale into the lungs, Disp: , Rfl:     escitalopram (LEXAPRO) 20 MG tablet, Take 20 mg by mouth daily, Disp: , Rfl:     Red Yeast Rice 600 MG CAPS, Take by mouth, Disp: , Rfl:     Multiple Vitamins-Minerals (CENTRUM SILVER 50+MEN) TABS, Take by mouth, Disp: , Rfl:   Allergies   Allergen Reactions    Atorvastatin Calcium Other (See Comments)     Leg cramps      Ezetimibe-Simvastatin Other (See Comments)     Leg pain/cramping       No past medical history on file. No past surgical history on file. No family history on file. Social History     Tobacco History     Smoking Status  Current Some Day Smoker Smoking Frequency  0 packs/day for 53 years (0 pk yrs) Smoking Tobacco Type  Cigars    Smokeless Tobacco Use  Never Used          Alcohol History     Alcohol Use Status  Not Currently          Drug Use     Drug Use Status  Never          Sexual Activity     Sexually Active  Not Currently Partners  Female              OBJECTIVE  Vitals:    08/11/20 1258   BP: 128/82   Site: Left Upper Arm   Position: Sitting   Pulse: 90   Temp: 97.7 °F (36.5 °C)   TempSrc: Temporal   SpO2: 98%   Weight: 230 lb 6.4 oz (104.5 kg)        Body mass index is 34.02 kg/m².     Patient is obese    Orders Placed This Encounter Procedures    Lipid Panel     Standing Status:   Future     Standing Expiration Date:   8/11/2021     Order Specific Question:   Is Patient Fasting?/# of Hours     Answer:   fasting    TSH without Reflex     Standing Status:   Future     Standing Expiration Date:   8/11/2021    Uric Acid     Standing Status:   Future     Standing Expiration Date:   8/11/2021    PSA SCREENING     Standing Status:   Future     Standing Expiration Date:   8/11/2021        EXAM   Physical Exam  Vitals signs and nursing note reviewed. Constitutional:       Appearance: Normal appearance. He is well-developed. He is obese. HENT:      Right Ear: Tympanic membrane, ear canal and external ear normal.      Left Ear: Tympanic membrane, ear canal and external ear normal.      Nose: Nose normal.      Mouth/Throat:      Pharynx: Oropharynx is clear. Eyes:      General: No scleral icterus. Conjunctiva/sclera: Conjunctivae normal.      Pupils: Pupils are equal, round, and reactive to light. Neck:      Musculoskeletal: Normal range of motion and neck supple. Thyroid: No thyroid mass or thyromegaly. Vascular: No carotid bruit or JVD. Trachea: Trachea normal.   Cardiovascular:      Rate and Rhythm: Normal rate and regular rhythm. Pulses: Normal pulses. Heart sounds: Normal heart sounds. No murmur. No gallop. Pulmonary:      Effort: Pulmonary effort is normal.      Breath sounds: Normal breath sounds. No wheezing, rhonchi or rales. Abdominal:      General: Bowel sounds are normal. There is no distension. Palpations: Abdomen is soft. There is no mass. Tenderness: There is no abdominal tenderness. There is no guarding. Musculoskeletal: Normal range of motion. Comments: Slightly stooped loss normal lumbar lordosis. Hips some OA not severe. TKR on right, severe OA left knee, left ankle seems normal mild venous insufficiency   Lymphadenopathy:      Cervical: No cervical adenopathy.    Skin: General: Skin is warm and dry. Coloration: Skin is not jaundiced. Findings: No bruising or rash. Neurological:      General: No focal deficit present. Mental Status: He is alert and oriented to person, place, and time. Motor: No abnormal muscle tone. Psychiatric:         Mood and Affect: Mood normal.         Behavior: Behavior normal.           Rosaura Chavez was seen today for back pain. Diagnoses and all orders for this visit:    Primary osteoarthritis involving multiple joints  -     naproxen (NAPROSYN) 500 MG tablet; Take 1 tablet by mouth 2 times daily (with meals)  -     tiZANidine (ZANAFLEX) 4 MG tablet; Take 1 tablet by mouth nightly as needed (low back pain) Knee or back pain  -     Uric Acid; Future primarily lumbar spine and knees. Left knee and ankle OA may be affecting low back. Will try this combination. Both Rosaura Chavez and I would like to avoid opiates if possible. Suggest he get 2nd set of shots    Screening for prostate cancer  -     PSA SCREENING; Future    Other hyperlipidemia  -     Lipid Panel; Future  -     TSH without Reflex; Future    Peripheral vascular disease (HCC)  PAD not suspected. Venous disease is present. Compression stockings might help. Wears brace left knee and sometimes ankle when he golfs          No follow-ups on file. Electronically signed by Brady Davis MD on 8/11/20 at 1:23 PM EDT    I have personally reviewed and updated the chief complaint, HPI, Past Medical, Family and Social History, as well as the above Review of Systems.

## 2020-09-15 ENCOUNTER — HOSPITAL ENCOUNTER (OUTPATIENT)
Age: 70
Discharge: HOME | End: 2020-09-17
Payer: MEDICARE

## 2020-09-15 ENCOUNTER — OFFICE VISIT (OUTPATIENT)
Dept: FAMILY MEDICINE CLINIC | Age: 70
End: 2020-09-15
Payer: MEDICARE

## 2020-09-15 VITALS
SYSTOLIC BLOOD PRESSURE: 118 MMHG | BODY MASS INDEX: 33.94 KG/M2 | TEMPERATURE: 98.2 F | HEART RATE: 94 BPM | OXYGEN SATURATION: 99 % | DIASTOLIC BLOOD PRESSURE: 72 MMHG | WEIGHT: 229.8 LBS

## 2020-09-15 LAB
CHOLESTEROL, TOTAL: 248 MG/DL (ref 0–199)
HBA1C MFR BLD: 5.3 % (ref 4–5.6)
HDLC SERPL-MCNC: 53 MG/DL
LDL CHOLESTEROL CALCULATED: 163 MG/DL (ref 0–99)
PROSTATE SPECIFIC ANTIGEN: 4.08 NG/ML (ref 0–4)
TRIGL SERPL-MCNC: 161 MG/DL (ref 0–149)
TSH SERPL DL<=0.05 MIU/L-ACNC: 1.66 UIU/ML (ref 0.27–4.2)
URIC ACID, SERUM: 5.6 MG/DL (ref 3.4–7)
VLDLC SERPL CALC-MCNC: 32 MG/DL

## 2020-09-15 PROCEDURE — 84550 ASSAY OF BLOOD/URIC ACID: CPT

## 2020-09-15 PROCEDURE — G0103 PSA SCREENING: HCPCS

## 2020-09-15 PROCEDURE — G8427 DOCREV CUR MEDS BY ELIG CLIN: HCPCS | Performed by: FAMILY MEDICINE

## 2020-09-15 PROCEDURE — 99214 OFFICE O/P EST MOD 30 MIN: CPT | Performed by: FAMILY MEDICINE

## 2020-09-15 PROCEDURE — 36415 COLL VENOUS BLD VENIPUNCTURE: CPT

## 2020-09-15 PROCEDURE — G8417 CALC BMI ABV UP PARAM F/U: HCPCS | Performed by: FAMILY MEDICINE

## 2020-09-15 PROCEDURE — 83036 HEMOGLOBIN GLYCOSYLATED A1C: CPT

## 2020-09-15 PROCEDURE — 3017F COLORECTAL CA SCREEN DOC REV: CPT | Performed by: FAMILY MEDICINE

## 2020-09-15 PROCEDURE — 20610 DRAIN/INJ JOINT/BURSA W/O US: CPT | Performed by: FAMILY MEDICINE

## 2020-09-15 PROCEDURE — 4004F PT TOBACCO SCREEN RCVD TLK: CPT | Performed by: FAMILY MEDICINE

## 2020-09-15 PROCEDURE — 86803 HEPATITIS C AB TEST: CPT

## 2020-09-15 PROCEDURE — 84443 ASSAY THYROID STIM HORMONE: CPT

## 2020-09-15 PROCEDURE — 1123F ACP DISCUSS/DSCN MKR DOCD: CPT | Performed by: FAMILY MEDICINE

## 2020-09-15 PROCEDURE — 80061 LIPID PANEL: CPT

## 2020-09-15 PROCEDURE — 4040F PNEUMOC VAC/ADMIN/RCVD: CPT | Performed by: FAMILY MEDICINE

## 2020-09-15 RX ORDER — TAMSULOSIN HYDROCHLORIDE 0.4 MG/1
0.4 CAPSULE ORAL DAILY
Qty: 90 CAPSULE | Refills: 1 | Status: SHIPPED | OUTPATIENT
Start: 2020-09-15 | End: 2021-01-12 | Stop reason: SDUPTHER

## 2020-09-15 RX ORDER — TAMSULOSIN HYDROCHLORIDE 0.4 MG/1
0.4 CAPSULE ORAL DAILY
Qty: 90 CAPSULE | Refills: 1 | Status: CANCELLED | OUTPATIENT
Start: 2020-09-15

## 2020-09-15 RX ORDER — LIDOCAINE HYDROCHLORIDE 10 MG/ML
20 INJECTION, SOLUTION INFILTRATION; PERINEURAL ONCE
Status: COMPLETED | OUTPATIENT
Start: 2020-09-15 | End: 2020-09-15

## 2020-09-15 RX ORDER — METHYLPREDNISOLONE ACETATE 40 MG/ML
40 INJECTION, SUSPENSION INTRA-ARTICULAR; INTRALESIONAL; INTRAMUSCULAR; SOFT TISSUE ONCE
Status: COMPLETED | OUTPATIENT
Start: 2020-09-15 | End: 2020-09-15

## 2020-09-15 RX ADMIN — METHYLPREDNISOLONE ACETATE 40 MG: 40 INJECTION, SUSPENSION INTRA-ARTICULAR; INTRALESIONAL; INTRAMUSCULAR; SOFT TISSUE at 14:57

## 2020-09-15 RX ADMIN — LIDOCAINE HYDROCHLORIDE 20 ML: 10 INJECTION, SOLUTION INFILTRATION; PERINEURAL at 14:56

## 2020-09-15 ASSESSMENT — ENCOUNTER SYMPTOMS
CONSTIPATION: 0
BLOOD IN STOOL: 0
ABDOMINAL PAIN: 0
WHEEZING: 0
COUGH: 0
VOMITING: 0
PHOTOPHOBIA: 0
DIARRHEA: 0
TROUBLE SWALLOWING: 0
SHORTNESS OF BREATH: 0
SINUS PAIN: 0
SORE THROAT: 0
BACK PAIN: 0
EYE REDNESS: 0

## 2020-09-15 NOTE — PROGRESS NOTES
OFFICE NOTE    9/15/20  Name: Tai Mcintyre  LVF:[id number]   Sex:male   Age:70 y.o. SUBJECTIVE  Chief Complaint   Patient presents with    Knee Pain     left       Patient presents for left knee pain. States he attempted to wear his knee brace which has cause more pain. finds golfing which he loves difficult. Review of Systems   Constitutional: Negative for appetite change, fever and unexpected weight change. HENT: Negative for congestion, ear pain, hearing loss, sinus pain, sore throat and trouble swallowing. Eyes: Negative for photophobia, redness and visual disturbance. Respiratory: Negative for cough, shortness of breath and wheezing. Cardiovascular: Negative for chest pain, palpitations and leg swelling. Gastrointestinal: Negative for abdominal pain, blood in stool, constipation, diarrhea and vomiting. Endocrine: Negative for cold intolerance, polydipsia and polyuria. Genitourinary: Negative for difficulty urinating, genital sores, hematuria and urgency. Musculoskeletal: Positive for arthralgias and gait problem. Negative for back pain and joint swelling. Allergic/Immunologic: Negative for food allergies. Neurological: Negative for dizziness, tremors, syncope and headaches. Hematological: Negative for adenopathy. Does not bruise/bleed easily. Psychiatric/Behavioral: Negative for agitation, dysphoric mood, hallucinations and sleep disturbance.             Current Outpatient Medications:     tamsulosin (FLOMAX) 0.4 MG capsule, Take 1 capsule by mouth daily, Disp: 90 capsule, Rfl: 1    ibuprofen (ADVIL;MOTRIN) 400 MG tablet, Take 400 mg by mouth every 6 hours as needed for Pain, Disp: , Rfl:     naproxen (NAPROSYN) 500 MG tablet, Take 1 tablet by mouth 2 times daily (with meals), Disp: 60 tablet, Rfl: 3    albuterol sulfate HFA (VENTOLIN HFA) 108 (90 Base) MCG/ACT inhaler, Inhale 2 puffs into the lungs 4 times daily as needed for Wheezing, Disp: 1 Inhaler, Rfl: 5   Occurrences:   1     Standing Expiration Date:   9/15/2021    Hemoglobin A1C     Standing Status:   Future     Number of Occurrences:   1     Standing Expiration Date:   9/15/2021    Hepatitis C Antibody     Standing Status:   Future     Number of Occurrences:   1     Standing Expiration Date:   9/15/2021    External Referral To Orthopedic Surgery     Referral Priority:   Routine     Referral Type:   Eval and Treat     Referral Reason:   Specialty Services Required     Referred to Provider:   Miguel Hernandez DO     Requested Specialty:   Orthopedic Surgery     Number of Visits Requested:   1    [place] ARTHROCENTESIS ASPIR&/INJ MAJOR JT/BURSA W/O US        EXAM   Physical Exam  Vitals signs and nursing note reviewed. Constitutional:       Appearance: Normal appearance. He is well-developed. HENT:      Right Ear: Tympanic membrane, ear canal and external ear normal.      Left Ear: Tympanic membrane, ear canal and external ear normal.      Nose: Nose normal.      Mouth/Throat:      Pharynx: Oropharynx is clear. No posterior oropharyngeal erythema. Eyes:      General: No scleral icterus. Conjunctiva/sclera: Conjunctivae normal.      Pupils: Pupils are equal, round, and reactive to light. Neck:      Musculoskeletal: Normal range of motion and neck supple. Thyroid: No thyroid mass or thyromegaly. Vascular: No carotid bruit or JVD. Trachea: Trachea normal.   Cardiovascular:      Rate and Rhythm: Normal rate and regular rhythm. Heart sounds: Normal heart sounds. No murmur. No gallop. Pulmonary:      Effort: Pulmonary effort is normal.      Breath sounds: Normal breath sounds. No wheezing, rhonchi or rales. Abdominal:      General: Bowel sounds are normal. There is no distension. Palpations: Abdomen is soft. There is no mass. Tenderness: There is no abdominal tenderness. There is no guarding. Hernia: No hernia is present. Musculoskeletal: Normal range of motion. General: Tenderness present. No swelling. Right lower leg: Edema present. Left lower leg: Edema present. Comments: TKR on right. Left knee crepitant. Possible suprapatellar bursitis   Lymphadenopathy:      Cervical: No cervical adenopathy. Skin:     General: Skin is warm and dry. Coloration: Skin is not jaundiced. Findings: No bruising or rash. Neurological:      General: No focal deficit present. Mental Status: He is alert and oriented to person, place, and time. Sensory: No sensory deficit. Motor: No weakness or abnormal muscle tone. Coordination: Coordination normal.      Gait: Gait abnormal.   Psychiatric:         Mood and Affect: Mood normal.         Behavior: Behavior normal.           Sofia Garcia was seen today for knee pain. Diagnoses and all orders for this visit:    Other hyperlipidemia  -     Lipid Panel; Future  -     TSH without Reflex; Future  Diet regulated at this time  Screening for prostate cancer  -     PSA SCREENING; Future    Hyperuricemia  -     Uric Acid; Future    Hyperglycemia  -     Cancel: POCT glycosylated hemoglobin (Hb A1C)  -     [place] ARTHROCENTESIS ASPIR&/INJ MAJOR JT/BURSA W/O US    Primary osteoarthritis of left knee  -     methylPREDNISolone acetate (DEPO-MEDROL) injection 40 mg  -     lidocaine 1 % injection 20 mL  -     External Referral To Orthopedic Surgery  Area prepped with betadine and ETOH, flexed 90 degrees and fixed to floor. Injected anterolateral approach uneventfully. Area dressed with bandaid. Impaired fasting glucose  -     Hemoglobin A1C; Future    Encounter for hepatitis C screening test for low risk patient  -     Hepatitis C Antibody; Future    Benign prostatic hyperplasia with urinary frequency  -     tamsulosin (FLOMAX) 0.4 MG capsule; Take 1 capsule by mouth daily    Primary osteoarthritis involving multiple joints          Return in about 4 months (around 1/15/2021).     Electronically signed by Mitzy Godinez MD on 9/15/20 at 2:27 PM EDT    I have personally reviewed and updated the chief complaint, HPI, Past Medical, Family and Social History, as well as the above Review of Systems.

## 2020-09-16 ENCOUNTER — TELEPHONE (OUTPATIENT)
Dept: FAMILY MEDICINE CLINIC | Age: 70
End: 2020-09-16

## 2020-09-16 LAB — HEPATITIS C ANTIBODY INTERPRETATION: NORMAL

## 2020-11-04 ENCOUNTER — TELEPHONE (OUTPATIENT)
Dept: FAMILY MEDICINE CLINIC | Age: 70
End: 2020-11-04

## 2020-11-04 NOTE — TELEPHONE ENCOUNTER
Spoke with pt in regards to overdue [employer]. States he never received a kit. Will mail out to pt. Verified address with pt.

## 2021-01-12 ENCOUNTER — OFFICE VISIT (OUTPATIENT)
Dept: FAMILY MEDICINE CLINIC | Age: 71
End: 2021-01-12
Payer: MEDICARE

## 2021-01-12 VITALS
DIASTOLIC BLOOD PRESSURE: 72 MMHG | HEART RATE: 85 BPM | OXYGEN SATURATION: 97 % | WEIGHT: 238 LBS | SYSTOLIC BLOOD PRESSURE: 128 MMHG | HEIGHT: 69 IN | BODY MASS INDEX: 35.25 KG/M2

## 2021-01-12 DIAGNOSIS — E78.49 OTHER HYPERLIPIDEMIA: ICD-10-CM

## 2021-01-12 DIAGNOSIS — R97.20 ELEVATED PSA: Primary | ICD-10-CM

## 2021-01-12 DIAGNOSIS — N40.1 BENIGN PROSTATIC HYPERPLASIA WITH URINARY FREQUENCY: ICD-10-CM

## 2021-01-12 DIAGNOSIS — R35.0 BENIGN PROSTATIC HYPERPLASIA WITH URINARY FREQUENCY: ICD-10-CM

## 2021-01-12 DIAGNOSIS — R97.20 ELEVATED PSA: ICD-10-CM

## 2021-01-12 PROCEDURE — 4004F PT TOBACCO SCREEN RCVD TLK: CPT | Performed by: FAMILY MEDICINE

## 2021-01-12 PROCEDURE — 1123F ACP DISCUSS/DSCN MKR DOCD: CPT | Performed by: FAMILY MEDICINE

## 2021-01-12 PROCEDURE — 3017F COLORECTAL CA SCREEN DOC REV: CPT | Performed by: FAMILY MEDICINE

## 2021-01-12 PROCEDURE — 99214 OFFICE O/P EST MOD 30 MIN: CPT | Performed by: FAMILY MEDICINE

## 2021-01-12 PROCEDURE — 4040F PNEUMOC VAC/ADMIN/RCVD: CPT | Performed by: FAMILY MEDICINE

## 2021-01-12 PROCEDURE — 93000 ELECTROCARDIOGRAM COMPLETE: CPT | Performed by: FAMILY MEDICINE

## 2021-01-12 PROCEDURE — G8417 CALC BMI ABV UP PARAM F/U: HCPCS | Performed by: FAMILY MEDICINE

## 2021-01-12 PROCEDURE — G8427 DOCREV CUR MEDS BY ELIG CLIN: HCPCS | Performed by: FAMILY MEDICINE

## 2021-01-12 PROCEDURE — G8484 FLU IMMUNIZE NO ADMIN: HCPCS | Performed by: FAMILY MEDICINE

## 2021-01-12 RX ORDER — TAMSULOSIN HYDROCHLORIDE 0.4 MG/1
0.4 CAPSULE ORAL DAILY
Qty: 90 CAPSULE | Refills: 1 | Status: SHIPPED | OUTPATIENT
Start: 2021-01-12 | End: 2021-04-26 | Stop reason: SDUPTHER

## 2021-01-12 ASSESSMENT — ENCOUNTER SYMPTOMS
WHEEZING: 0
EYE REDNESS: 0
PHOTOPHOBIA: 0
ABDOMINAL PAIN: 0
SINUS PAIN: 0
COUGH: 0
BACK PAIN: 0
SORE THROAT: 0
VOMITING: 0
DIARRHEA: 0
BLOOD IN STOOL: 0
CONSTIPATION: 0
TROUBLE SWALLOWING: 0
SHORTNESS OF BREATH: 0

## 2021-01-12 NOTE — PROGRESS NOTES
OFFICE NOTE    1/12/21  Name: Polo Goldberg  SKN:[id number]   Sex:male   Age:70 y.o. SUBJECTIVE  Chief Complaint   Patient presents with    Hyperlipidemia     4 mo f/u       HPI Admitted to me he is not taking any of his antidepressants, \"they don't work for [employer]"  C/o tiredness, lack of ambition. Says sleeps in same room with his wife and she does not think he has MARIO. Review of Systems   Constitutional: Positive for activity change and fatigue. Negative for appetite change, fever and unexpected weight change. HENT: Negative for congestion, ear pain, hearing loss, sinus pain, sore throat and trouble swallowing. Eyes: Negative for photophobia, redness and visual disturbance. Respiratory: Negative for cough, shortness of breath and wheezing. Cardiovascular: Negative for chest pain, palpitations and leg swelling. Gastrointestinal: Negative for abdominal pain, blood in stool, constipation, diarrhea and vomiting. Endocrine: Negative for cold intolerance, polydipsia and polyuria. Genitourinary: Positive for urgency. Negative for difficulty urinating, genital sores and hematuria. Musculoskeletal: Positive for arthralgias. Negative for back pain and joint swelling. Skin: Negative for pallor and rash. Allergic/Immunologic: Negative for food allergies. Neurological: Negative for dizziness, tremors, seizures, syncope, numbness and headaches. Hematological: Negative for adenopathy. Does not bruise/bleed easily. Psychiatric/Behavioral: Negative for agitation, dysphoric mood, hallucinations and sleep disturbance. The patient is nervous/anxious. All other systems reviewed and are negative.            Current Outpatient Medications:     tamsulosin (FLOMAX) 0.4 MG capsule, Take 1 capsule by mouth daily, Disp: 90 capsule, Rfl: 1    ibuprofen (ADVIL;MOTRIN) 400 MG tablet, Take 400 mg by mouth every 6 hours as needed for Pain, Disp: , Rfl:   albuterol sulfate HFA (VENTOLIN HFA) 108 (90 Base) MCG/ACT inhaler, Inhale 2 puffs into the lungs 4 times daily as needed for Wheezing, Disp: 1 Inhaler, Rfl: 5    albuterol sulfate (PROAIR RESPICLICK) 749 (90 Base) MCG/ACT aerosol powder inhalation, Inhale into the lungs, Disp: , Rfl:     Multiple Vitamins-Minerals (CENTRUM SILVER 50+MEN) TABS, Take by mouth, Disp: , Rfl:     naproxen (NAPROSYN) 500 MG tablet, Take 1 tablet by mouth 2 times daily (with meals) (Patient not taking: Reported on 1/12/2021), Disp: 60 tablet, Rfl: 3    Red Yeast Rice 600 MG CAPS, Take by mouth, Disp: , Rfl:   Allergies   Allergen Reactions    Atorvastatin Calcium Other (See Comments)     Leg cramps      Ezetimibe-Simvastatin Other (See Comments)     Leg pain/cramping       No past medical history on file. No past surgical history on file. No family history on file. Social History     Tobacco History     Smoking Status  Current Some Day Smoker Smoking Frequency  0 packs/day for 53 years (0 pk yrs) Smoking Tobacco Type  Cigars    Smokeless Tobacco Use  Never Used          Alcohol History     Alcohol Use Status  Not Currently          Drug Use     Drug Use Status  Never          Sexual Activity     Sexually Active  Not Currently Partners  Female                OBJECTIVE  Vitals:    01/12/21 0929   BP: 128/72   Pulse: 85   SpO2: 97%   Weight: 238 lb (108 kg)   Height: 5' 9\" (1.753 m)        Body mass index is 35.15 kg/m². Orders Placed This Encounter   Procedures    PSA, TOTAL AND FREE     Standing Status:   Future     Standing Expiration Date:   1/12/2022    EKG 12 Lead     Standing Status:   Future     Standing Expiration Date:   1/12/2022     Order Specific Question:   Reason for Exam?     Answer: Other        EXAM   Physical Exam  Vitals signs and nursing note reviewed. Constitutional:       Appearance: Normal appearance. He is well-developed. He is obese.    HENT: Right Ear: Tympanic membrane, ear canal and external ear normal.      Left Ear: Tympanic membrane, ear canal and external ear normal.      Nose: Nose normal.      Mouth/Throat:      Pharynx: Oropharynx is clear. No posterior oropharyngeal erythema. Eyes:      General: No scleral icterus. Conjunctiva/sclera: Conjunctivae normal.      Pupils: Pupils are equal, round, and reactive to light. Neck:      Musculoskeletal: Normal range of motion and neck supple. Thyroid: No thyroid mass or thyromegaly. Vascular: No carotid bruit or JVD. Trachea: Trachea normal.   Cardiovascular:      Rate and Rhythm: Normal rate and regular rhythm. Pulses: Normal pulses. Heart sounds: Normal heart sounds. No murmur. No gallop. Pulmonary:      Effort: Pulmonary effort is normal.      Breath sounds: Normal breath sounds. No wheezing, rhonchi or rales. Abdominal:      General: Bowel sounds are normal. There is no distension. Palpations: Abdomen is soft. There is no mass. Tenderness: There is no abdominal tenderness. There is no guarding. Hernia: No hernia is present. Musculoskeletal: Normal range of motion. General: Tenderness present. No swelling. Right lower leg: No edema. Left lower leg: No edema. Lymphadenopathy:      Cervical: No cervical adenopathy. Skin:     General: Skin is warm and dry. Capillary Refill: Capillary refill takes less than 2 seconds. Coloration: Skin is not jaundiced. Findings: No bruising or rash. Neurological:      General: No focal deficit present. Mental Status: He is alert and oriented to person, place, and time. Sensory: No sensory deficit. Motor: No weakness or abnormal muscle tone. Coordination: Coordination normal.      Gait: Gait normal.   Psychiatric:         Mood and Affect: Mood normal.         Behavior: Behavior normal.           Cleve Lesch was seen today for hyperlipidemia. Diagnoses and all orders for this visit:    Elevated PSA  -     PSA, TOTAL AND FREE; Future  Significant bump in PSA last OV from more than a year previously  Benign prostatic hyperplasia with urinary frequency  -     tamsulosin (FLOMAX) 0.4 MG capsule; Take 1 capsule by mouth daily  Gets up a couple of times a night. Could add beta prostate to current meds  Other hyperlipidemia  -     EKG 12 Lead; Future  No acute changes since last EKG    Needs something to do besides sit at home with Kenzie Alvarado and wish he was doing something    No follow-ups on file.     Electronically signed by Michael Pennington MD on 1/12/21 at 10:37 AM EST

## 2021-01-15 LAB
PROSTATE SPECIFIC ANTIGEN FREE: 0.5 NG/ML
PROSTATE SPECIFIC ANTIGEN PERCENT FREE: 14 %
PROSTATE SPECIFIC ANTIGEN: 3.6 NG/ML (ref 0–4)

## 2021-01-18 ENCOUNTER — TELEPHONE (OUTPATIENT)
Dept: FAMILY MEDICINE CLINIC | Age: 71
End: 2021-01-18

## 2021-01-18 DIAGNOSIS — R97.20 PSA ELEVATION: Primary | ICD-10-CM

## 2021-01-19 NOTE — TELEPHONE ENCOUNTER
----- Message from Estephania Shafer sent at 1/18/2021  9:40 AM EST -----  Notified patient. Patient verbalized understanding.   Pt is in agreement with seeing a urologist.

## 2021-03-30 ENCOUNTER — OFFICE VISIT (OUTPATIENT)
Dept: FAMILY MEDICINE CLINIC | Age: 71
End: 2021-03-30
Payer: MEDICARE

## 2021-03-30 VITALS
OXYGEN SATURATION: 98 % | BODY MASS INDEX: 34.79 KG/M2 | DIASTOLIC BLOOD PRESSURE: 74 MMHG | TEMPERATURE: 97.7 F | HEART RATE: 87 BPM | WEIGHT: 235.6 LBS | SYSTOLIC BLOOD PRESSURE: 128 MMHG

## 2021-03-30 DIAGNOSIS — R35.0 BENIGN PROSTATIC HYPERPLASIA WITH URINARY FREQUENCY: Primary | ICD-10-CM

## 2021-03-30 DIAGNOSIS — R40.0 SOMNOLENCE: ICD-10-CM

## 2021-03-30 DIAGNOSIS — N40.1 BENIGN PROSTATIC HYPERPLASIA WITH URINARY FREQUENCY: Primary | ICD-10-CM

## 2021-03-30 PROCEDURE — 99213 OFFICE O/P EST LOW 20 MIN: CPT | Performed by: FAMILY MEDICINE

## 2021-03-30 PROCEDURE — 4004F PT TOBACCO SCREEN RCVD TLK: CPT | Performed by: FAMILY MEDICINE

## 2021-03-30 PROCEDURE — G8427 DOCREV CUR MEDS BY ELIG CLIN: HCPCS | Performed by: FAMILY MEDICINE

## 2021-03-30 PROCEDURE — G8417 CALC BMI ABV UP PARAM F/U: HCPCS | Performed by: FAMILY MEDICINE

## 2021-03-30 PROCEDURE — 1123F ACP DISCUSS/DSCN MKR DOCD: CPT | Performed by: FAMILY MEDICINE

## 2021-03-30 PROCEDURE — 3017F COLORECTAL CA SCREEN DOC REV: CPT | Performed by: FAMILY MEDICINE

## 2021-03-30 PROCEDURE — 4040F PNEUMOC VAC/ADMIN/RCVD: CPT | Performed by: FAMILY MEDICINE

## 2021-03-30 PROCEDURE — G8484 FLU IMMUNIZE NO ADMIN: HCPCS | Performed by: FAMILY MEDICINE

## 2021-03-30 RX ORDER — MODAFINIL 200 MG/1
200 TABLET ORAL DAILY
Qty: 30 TABLET | Refills: 3 | Status: SHIPPED | OUTPATIENT
Start: 2021-03-30 | End: 2021-04-29

## 2021-03-30 ASSESSMENT — ENCOUNTER SYMPTOMS
COUGH: 0
SHORTNESS OF BREATH: 0
SINUS PAIN: 0
BLOOD IN STOOL: 0
PHOTOPHOBIA: 0
CONSTIPATION: 0
VOMITING: 0
TROUBLE SWALLOWING: 0
WHEEZING: 0
BACK PAIN: 0
EYE REDNESS: 0
SORE THROAT: 0
ABDOMINAL PAIN: 0
DIARRHEA: 0

## 2021-03-30 ASSESSMENT — PATIENT HEALTH QUESTIONNAIRE - PHQ9
SUM OF ALL RESPONSES TO PHQ9 QUESTIONS 1 & 2: 0
SUM OF ALL RESPONSES TO PHQ QUESTIONS 1-9: 0
2. FEELING DOWN, DEPRESSED OR HOPELESS: 0
SUM OF ALL RESPONSES TO PHQ QUESTIONS 1-9: 0

## 2021-03-31 NOTE — PROGRESS NOTES
OFFICE NOTE    3/30/21  Name: Jeffy Moreno  D:[birth year]   Sex:male   Age:70 y.o. SUBJECTIVE  Chief Complaint   Patient presents with    Advice Only     supposed to have biopsy 4/14 due to PSA    Fatigue       HPI reluctant to have TRUS Biopsy of prostate due to request he stop his Motrin for 7-10 days prior to procedure. Continues to c/o of lack of energy, motivation, desire to do things. Antidepressants have not helped him in past    Review of Systems   Constitutional: Positive for fatigue. Negative for appetite change, fever and unexpected weight change. HENT: Negative for congestion, ear pain, hearing loss, sinus pain, sore throat and trouble swallowing. Eyes: Negative for photophobia, redness and visual disturbance. Respiratory: Negative for cough, shortness of breath and wheezing. Cardiovascular: Negative for chest pain, palpitations and leg swelling. Gastrointestinal: Negative for abdominal pain, blood in stool, constipation, diarrhea and vomiting. Endocrine: Negative for cold intolerance, polydipsia and polyuria. Genitourinary: Positive for urgency. Negative for difficulty urinating, genital sores and hematuria. Musculoskeletal: Positive for arthralgias. Negative for back pain and joint swelling. Allergic/Immunologic: Negative for food allergies. Neurological: Negative for dizziness, tremors, seizures, syncope, numbness and headaches. Hematological: Negative for adenopathy. Does not bruise/bleed easily. Psychiatric/Behavioral: Negative for agitation, dysphoric mood, hallucinations and sleep disturbance. The patient is nervous/anxious. All other systems reviewed and are negative. Current Outpatient Medications:     modafinil (PROVIGIL) 200 MG tablet, Take 1 tablet by mouth daily for 30 days. , Disp: 30 tablet, Rfl: 3    tamsulosin (FLOMAX) 0.4 MG capsule, Take 1 capsule by mouth daily, Disp: 90 capsule, Rfl: 1    ibuprofen (ADVIL;MOTRIN) 400 MG tablet, Take 400 mg by mouth every 6 hours as needed for Pain, Disp: , Rfl:     albuterol sulfate HFA (VENTOLIN HFA) 108 (90 Base) MCG/ACT inhaler, Inhale 2 puffs into the lungs 4 times daily as needed for Wheezing, Disp: 1 Inhaler, Rfl: 5    albuterol sulfate (PROAIR RESPICLICK) 439 (90 Base) MCG/ACT aerosol powder inhalation, Inhale into the lungs, Disp: , Rfl:     Red Yeast Rice 600 MG CAPS, Take by mouth, Disp: , Rfl:     Multiple Vitamins-Minerals (CENTRUM SILVER 50+MEN) TABS, Take by mouth, Disp: , Rfl:   Allergies   Allergen Reactions    Atorvastatin Calcium Other (See Comments)     Leg cramps      Ezetimibe-Simvastatin Other (See Comments)     Leg pain/cramping       No past medical history on file. No past surgical history on file. No family history on file. Social History     Tobacco History     Smoking Status  Current Some Day Smoker Smoking Frequency  0 packs/day for 53 years (0 pk yrs) Smoking Tobacco Type  Cigars    Smokeless Tobacco Use  Never Used          Alcohol History     Alcohol Use Status  Not Currently          Drug Use     Drug Use Status  Never          Sexual Activity     Sexually Active  Not Currently Partners  Female                OBJECTIVE  Vitals:    03/30/21 1100   BP: 128/74   Site: Left Upper Arm   Position: Sitting   Pulse: 87   Temp: 97.7 °F (36.5 °C)   TempSrc: Temporal   SpO2: 98%   Weight: 235 lb 9.6 oz (106.9 kg)        Body mass index is 34.79 kg/m². No orders of the defined types were placed in this encounter. EXAM   Physical Exam  Vitals signs and nursing note reviewed. Constitutional:       Appearance: Normal appearance. He is well-developed. He is obese. HENT:      Right Ear: Tympanic membrane, ear canal and external ear normal.      Left Ear: Tympanic membrane, ear canal and external ear normal.      Nose: Nose normal. No rhinorrhea. Mouth/Throat:      Pharynx: Oropharynx is clear. No posterior oropharyngeal erythema.    Eyes:      General: No scleral icterus. Conjunctiva/sclera: Conjunctivae normal.   Neck:      Musculoskeletal: Normal range of motion and neck supple. Thyroid: No thyroid mass or thyromegaly. Vascular: No carotid bruit or JVD. Trachea: Trachea normal.   Cardiovascular:      Rate and Rhythm: Normal rate and regular rhythm. Pulses: Normal pulses. Heart sounds: Normal heart sounds. No murmur. No gallop. Pulmonary:      Effort: Pulmonary effort is normal.      Breath sounds: Normal breath sounds. No wheezing, rhonchi or rales. Abdominal:      General: Bowel sounds are normal. There is no distension. Palpations: Abdomen is soft. There is no mass. Tenderness: There is no abdominal tenderness. There is no guarding. Musculoskeletal: Normal range of motion. General: No swelling or tenderness. Right lower leg: No edema. Left lower leg: Edema present. Lymphadenopathy:      Cervical: No cervical adenopathy. Skin:     General: Skin is warm and dry. Capillary Refill: Capillary refill takes less than 2 seconds. Coloration: Skin is not jaundiced. Findings: No bruising or rash. Neurological:      General: No focal deficit present. Mental Status: He is alert and oriented to person, place, and time. Motor: No abnormal muscle tone. Psychiatric:         Behavior: Behavior normal.           Alejandro Elias was seen today for advice only and fatigue. Diagnoses and all orders for this visit:    Benign prostatic hyperplasia with urinary frequency  Discussed rationale for biopsy. Will have him come in and get methyl pred shot week before biopsy and hold Motrin then. Take Tylenol up to 6 500 mg tabs per day when off this  Somnolence  -     modafinil (PROVIGIL) 200 MG tablet; Take 1 tablet by mouth daily for 30 days. Have been hearing about this fatigue and lack of desire to do anything for years. Nothing has helped. Will try provigil    F/u 3 mos    No follow-ups on file. Electronically signed by Kathe Reeves MD on 3/30/21 at 10:20 PM EDT

## 2021-04-07 ENCOUNTER — TELEPHONE (OUTPATIENT)
Dept: ADMINISTRATIVE | Age: 71
End: 2021-04-07

## 2021-04-07 ENCOUNTER — NURSE ONLY (OUTPATIENT)
Dept: FAMILY MEDICINE CLINIC | Age: 71
End: 2021-04-07
Payer: MEDICARE

## 2021-04-07 DIAGNOSIS — M46.1 SACROILIITIS (HCC): Primary | ICD-10-CM

## 2021-04-07 PROCEDURE — 96372 THER/PROPH/DIAG INJ SC/IM: CPT | Performed by: FAMILY MEDICINE

## 2021-04-07 RX ORDER — METHYLPREDNISOLONE ACETATE 40 MG/ML
40 INJECTION, SUSPENSION INTRA-ARTICULAR; INTRALESIONAL; INTRAMUSCULAR; SOFT TISSUE ONCE
Status: COMPLETED | OUTPATIENT
Start: 2021-04-07 | End: 2021-04-07

## 2021-04-07 RX ADMIN — METHYLPREDNISOLONE ACETATE 40 MG: 40 INJECTION, SUSPENSION INTRA-ARTICULAR; INTRALESIONAL; INTRAMUSCULAR; SOFT TISSUE at 15:29

## 2021-04-07 NOTE — TELEPHONE ENCOUNTER
Pt's wife Noah Ahumada) called and requested to schedule pt this week with Dr. Carmela Sun, no availability. Pt does not want to go to [facility]. Pt's wife said pt has been seen in the past, was unable to see any past visits. He is having pain in his toes on his rt foot and said his nails need trimmed. Offered next available on 4/19, but Marcia Pizano said he needs seen sooner. Please contact Marcia Pizano.

## 2021-04-15 DIAGNOSIS — M79.671 RIGHT FOOT PAIN: Primary | ICD-10-CM

## 2021-04-16 ENCOUNTER — PROCEDURE VISIT (OUTPATIENT)
Dept: PODIATRY | Age: 71
End: 2021-04-16
Payer: MEDICARE

## 2021-04-16 VITALS — WEIGHT: 235 LBS | BODY MASS INDEX: 34.8 KG/M2 | HEIGHT: 69 IN

## 2021-04-16 DIAGNOSIS — R26.2 DIFFICULTY WALKING: ICD-10-CM

## 2021-04-16 DIAGNOSIS — M79.674 PAIN OF TOE OF RIGHT FOOT: ICD-10-CM

## 2021-04-16 DIAGNOSIS — I87.2 VENOUS INSUFFICIENCY (CHRONIC) (PERIPHERAL): ICD-10-CM

## 2021-04-16 DIAGNOSIS — M79.675 PAIN OF TOE OF LEFT FOOT: ICD-10-CM

## 2021-04-16 DIAGNOSIS — I73.9 PVD (PERIPHERAL VASCULAR DISEASE) (HCC): ICD-10-CM

## 2021-04-16 DIAGNOSIS — B35.1 ONYCHOMYCOSIS: Primary | ICD-10-CM

## 2021-04-16 PROCEDURE — 11721 DEBRIDE NAIL 6 OR MORE: CPT | Performed by: PODIATRIST

## 2021-04-16 NOTE — PROGRESS NOTES
Patient is in today for evaluation of right toe pain. Patient says when the socks rub and touch his toes, he gets a lot of pain. Patient also needs nail care to both feet.  pcp is Rodger Juan MD  Last ov 3/30/21

## 2021-04-16 NOTE — PROGRESS NOTES
21     [facility]    : 1950  Sex: male  Age: 69 y.o. Subjective: The patient is seen today for evaluation regarding foot evaluation and mycotic nail care. No other complaints noted. Chief Complaint   Patient presents with    Toe Pain     right foot       Current Medications:    Current Outpatient Medications:     modafinil (PROVIGIL) 200 MG tablet, Take 1 tablet by mouth daily for 30 days. , Disp: 30 tablet, Rfl: 3    tamsulosin (FLOMAX) 0.4 MG capsule, Take 1 capsule by mouth daily, Disp: 90 capsule, Rfl: 1    ibuprofen (ADVIL;MOTRIN) 400 MG tablet, Take 400 mg by mouth every 6 hours as needed for Pain, Disp: , Rfl:     albuterol sulfate HFA (VENTOLIN HFA) 108 (90 Base) MCG/ACT inhaler, Inhale 2 puffs into the lungs 4 times daily as needed for Wheezing, Disp: 1 Inhaler, Rfl: 5    albuterol sulfate (PROAIR RESPICLICK) 487 (90 Base) MCG/ACT aerosol powder inhalation, Inhale into the lungs, Disp: , Rfl:     Red Yeast Rice 600 MG CAPS, Take by mouth, Disp: , Rfl:     Multiple Vitamins-Minerals (CENTRUM SILVER 50+MEN) TABS, Take by mouth, Disp: , Rfl:     Allergies: Allergies   Allergen Reactions    Atorvastatin Calcium Other (See Comments)     Leg cramps      Ezetimibe-Simvastatin Other (See Comments)     Leg pain/cramping       No past surgical history on file. No past medical history on file. Vitals:    21 0922   Weight: 235 lb (106.6 kg)   Height: 5' 9\" (1.753 m)       Exam:  Neurovascular status unchanged. At this time the nail/s 1, 2, 3, 4, 5 right foot and nail/s 1, 2, 3, 4, 5 left foot are noted to be thickened, dystrophic and discolored with subungual debris present. Tenderness noted to palpation. Minimal hair growth is noted to both lower extremities. Edema noted with both varicosities and stasis skin changes present bilaterally. Coolness is noted to the digital regions to palpation. Capillary fill time delayed digital areas bilateral foot.   No heel

## 2021-04-22 NOTE — TELEPHONE ENCOUNTER
Leverette Homans calling in provigil cost $25 per month. He cannot pay that. Will [facility] a [facility] ?

## 2021-04-23 NOTE — TELEPHONE ENCOUNTER
Left message for patient to return call about medication.    FYI if he uses goodrx he can get it at [facility] for 18.01 for a 30 day supply

## 2021-04-23 NOTE — TELEPHONE ENCOUNTER
Patient returned call and said he will try and get the script back from [facility] and go to [facility]

## 2021-04-26 DIAGNOSIS — N40.1 BENIGN PROSTATIC HYPERPLASIA WITH URINARY FREQUENCY: ICD-10-CM

## 2021-04-26 DIAGNOSIS — R35.0 BENIGN PROSTATIC HYPERPLASIA WITH URINARY FREQUENCY: ICD-10-CM

## 2021-04-26 RX ORDER — TAMSULOSIN HYDROCHLORIDE 0.4 MG/1
0.4 CAPSULE ORAL DAILY
Qty: 90 CAPSULE | Refills: 1 | Status: SHIPPED
Start: 2021-04-26 | End: 2021-05-11 | Stop reason: SDUPTHER

## 2021-04-26 NOTE — TELEPHONE ENCOUNTER
Last Appointment:  3/30/2021  Future Appointments   Date Time Provider Dimple Cagle   5/11/2021  9:30 AM Milli Elena  [address] [place] [address]

## 2021-05-11 ENCOUNTER — OFFICE VISIT (OUTPATIENT)
Dept: FAMILY MEDICINE CLINIC | Age: 71
End: 2021-05-11
Payer: MEDICARE

## 2021-05-11 VITALS
WEIGHT: 234 LBS | BODY MASS INDEX: 34.56 KG/M2 | TEMPERATURE: 94 F | OXYGEN SATURATION: 99 % | SYSTOLIC BLOOD PRESSURE: 132 MMHG | DIASTOLIC BLOOD PRESSURE: 72 MMHG | HEART RATE: 81 BPM

## 2021-05-11 DIAGNOSIS — R35.0 BENIGN PROSTATIC HYPERPLASIA WITH URINARY FREQUENCY: ICD-10-CM

## 2021-05-11 DIAGNOSIS — F32.9 REACTIVE DEPRESSION: ICD-10-CM

## 2021-05-11 DIAGNOSIS — M46.1 SACROILIITIS (HCC): ICD-10-CM

## 2021-05-11 DIAGNOSIS — N40.1 BENIGN PROSTATIC HYPERPLASIA WITH URINARY FREQUENCY: ICD-10-CM

## 2021-05-11 PROBLEM — I73.9 PERIPHERAL VASCULAR DISEASE (HCC): Status: RESOLVED | Noted: 2018-07-09 | Resolved: 2021-05-11

## 2021-05-11 PROCEDURE — 3017F COLORECTAL CA SCREEN DOC REV: CPT | Performed by: FAMILY MEDICINE

## 2021-05-11 PROCEDURE — 4004F PT TOBACCO SCREEN RCVD TLK: CPT | Performed by: FAMILY MEDICINE

## 2021-05-11 PROCEDURE — G8417 CALC BMI ABV UP PARAM F/U: HCPCS | Performed by: FAMILY MEDICINE

## 2021-05-11 PROCEDURE — G8427 DOCREV CUR MEDS BY ELIG CLIN: HCPCS | Performed by: FAMILY MEDICINE

## 2021-05-11 PROCEDURE — 99214 OFFICE O/P EST MOD 30 MIN: CPT | Performed by: FAMILY MEDICINE

## 2021-05-11 PROCEDURE — 4040F PNEUMOC VAC/ADMIN/RCVD: CPT | Performed by: FAMILY MEDICINE

## 2021-05-11 PROCEDURE — 1123F ACP DISCUSS/DSCN MKR DOCD: CPT | Performed by: FAMILY MEDICINE

## 2021-05-11 RX ORDER — DULOXETIN HYDROCHLORIDE 30 MG/1
30 CAPSULE, DELAYED RELEASE ORAL DAILY
Qty: 30 CAPSULE | Refills: 5 | Status: SHIPPED | OUTPATIENT
Start: 2021-05-11

## 2021-05-11 RX ORDER — TAMSULOSIN HYDROCHLORIDE 0.4 MG/1
0.4 CAPSULE ORAL DAILY
Qty: 90 CAPSULE | Refills: 1 | Status: SHIPPED | OUTPATIENT
Start: 2021-05-11 | End: 2021-09-07 | Stop reason: SDUPTHER

## 2021-05-11 SDOH — ECONOMIC STABILITY: INCOME INSECURITY: HOW HARD IS IT FOR YOU TO PAY FOR THE VERY BASICS LIKE FOOD, HOUSING, MEDICAL CARE, AND HEATING?: SOMEWHAT HARD

## 2021-05-11 SDOH — ECONOMIC STABILITY: TRANSPORTATION INSECURITY
IN THE PAST 12 MONTHS, HAS LACK OF TRANSPORTATION KEPT YOU FROM MEETINGS, WORK, OR FROM GETTING THINGS NEEDED FOR DAILY LIVING?: NOT ASKED

## 2021-05-11 SDOH — ECONOMIC STABILITY: FOOD INSECURITY: WITHIN THE PAST 12 MONTHS, THE FOOD YOU BOUGHT JUST DIDN'T LAST AND YOU DIDN'T HAVE MONEY TO GET MORE.: NEVER TRUE

## 2021-05-11 SDOH — ECONOMIC STABILITY: FOOD INSECURITY: WITHIN THE PAST 12 MONTHS, YOU WORRIED THAT YOUR FOOD WOULD RUN OUT BEFORE YOU GOT MONEY TO BUY MORE.: NEVER TRUE

## 2021-05-11 ASSESSMENT — ENCOUNTER SYMPTOMS
DIARRHEA: 0
COUGH: 0
VOMITING: 0
CONSTIPATION: 0
SHORTNESS OF BREATH: 0
PHOTOPHOBIA: 0
ABDOMINAL PAIN: 0
SINUS PAIN: 0
WHEEZING: 0
BACK PAIN: 1
TROUBLE SWALLOWING: 0
EYE REDNESS: 0
BLOOD IN STOOL: 0
SORE THROAT: 0

## 2021-05-11 NOTE — PROGRESS NOTES
OFFICE NOTE    5/11/21  Name: Denzil Severance  UTX:7/4/5570   Sex:male   Age:70 y.o. SUBJECTIVE  Chief Complaint   Patient presents with    Medication Refill     patient would like written script for flomax refills    Other     6 month f/u    Depression     been feeling that way for a while       HPI Pt was tried last time on a stimulant for his c/o excessive sleepiness and lack of motivation. Say provigil made him sleepy and dizzy. Sounds like he and the wife drive each other crazy. He seems depressed but nothing has worked in past when we have tried it. Review of Systems   Constitutional: Positive for activity change and fever. Negative for appetite change and unexpected weight change. HENT: Negative for congestion, ear pain, hearing loss, sinus pain, sore throat and trouble swallowing. Eyes: Negative for photophobia, redness and visual disturbance. Respiratory: Negative for cough, shortness of breath and wheezing. Cardiovascular: Negative for chest pain, palpitations and leg swelling. Gastrointestinal: Negative for abdominal pain, blood in stool, constipation, diarrhea and vomiting. Endocrine: Negative for cold intolerance, polydipsia and polyuria. Genitourinary: Positive for urgency. Negative for difficulty urinating, genital sores and hematuria. Musculoskeletal: Positive for arthralgias and back pain. Negative for joint swelling. Skin: Negative for pallor and rash. Allergic/Immunologic: Negative for food allergies. Neurological: Negative for dizziness, tremors, seizures, syncope, numbness and headaches. Hematological: Negative for adenopathy. Does not bruise/bleed easily. Psychiatric/Behavioral: Negative for agitation, dysphoric mood, hallucinations and sleep disturbance. The patient is nervous/anxious.              Current Outpatient Medications:     tamsulosin (FLOMAX) 0.4 MG capsule, Take 1 capsule by mouth daily, Disp: 90 capsule, Rfl: 1    DULoxetine (CYMBALTA) 30 MG extended release capsule, Take 1 capsule by mouth daily, Disp: 30 capsule, Rfl: 5    ibuprofen (ADVIL;MOTRIN) 400 MG tablet, Take 400 mg by mouth every 6 hours as needed for Pain, Disp: , Rfl:     Red Yeast Rice 600 MG CAPS, Take by mouth, Disp: , Rfl:     Multiple Vitamins-Minerals (CENTRUM SILVER 50+MEN) TABS, Take by mouth, Disp: , Rfl:   Allergies   Allergen Reactions    Atorvastatin Calcium Other (See Comments)     Leg cramps      Ezetimibe-Simvastatin Other (See Comments)     Leg pain/cramping       No past medical history on file. No past surgical history on file. No family history on file. Social History     Tobacco History     Smoking Status  Current Some Day Smoker Smoking Frequency  0 packs/day for 53 years (0 pk yrs) Smoking Tobacco Type  Cigars    Smokeless Tobacco Use  Never Used          Alcohol History     Alcohol Use Status  Not Currently          Drug Use     Drug Use Status  Never          Sexual Activity     Sexually Active  Not Currently Partners  Female                OBJECTIVE  Vitals:    05/11/21 0941   BP: 132/72   Pulse: 81   Temp: 94 °F (34.4 °C)   SpO2: 99%   Weight: 234 lb (106.1 kg)        Body mass index is 34.56 kg/m². No orders of the defined types were placed in this encounter. EXAM   Physical Exam  Vitals signs and nursing note reviewed. Constitutional:       Appearance: Normal appearance. He is well-developed. He is obese. HENT:      Right Ear: Tympanic membrane, ear canal and external ear normal.      Left Ear: Tympanic membrane, ear canal and external ear normal.      Nose: Nose normal.      Mouth/Throat:      Pharynx: Oropharynx is clear. Eyes:      General: No scleral icterus. Conjunctiva/sclera: Conjunctivae normal.      Pupils: Pupils are equal, round, and reactive to light. Neck:      Musculoskeletal: Normal range of motion and neck supple. Thyroid: No thyroid mass or thyromegaly. Vascular: No carotid bruit or JVD.       Trachea: Trachea normal.   Cardiovascular:      Rate and Rhythm: Normal rate and regular rhythm. Pulses: Normal pulses. Heart sounds: Normal heart sounds. No murmur. No gallop. Pulmonary:      Effort: Pulmonary effort is normal.      Breath sounds: Normal breath sounds. No wheezing, rhonchi or rales. Abdominal:      General: Bowel sounds are normal. There is no distension. Palpations: Abdomen is soft. There is no mass. Tenderness: There is no abdominal tenderness. There is no guarding. Hernia: No hernia is present. Musculoskeletal: Normal range of motion. Comments: Some OA of knees and back   Lymphadenopathy:      Cervical: No cervical adenopathy. Skin:     General: Skin is warm and dry. Capillary Refill: Capillary refill takes less than 2 seconds. Coloration: Skin is not jaundiced. Findings: No bruising or rash. Neurological:      General: No focal deficit present. Mental Status: He is alert and oriented to person, place, and time. Sensory: No sensory deficit. Motor: No weakness or abnormal muscle tone. Coordination: Coordination normal.      Gait: Gait normal.   Psychiatric:         Behavior: Behavior normal.      Comments: Overall seems discouraged. Not severely depressed           Princess Santoro was seen today for medication refill, other and depression. Diagnoses and all orders for this visit:    Benign prostatic hyperplasia with urinary frequency  -     tamsulosin (FLOMAX) 0.4 MG capsule; Take 1 capsule by mouth daily  Seems to be helping him some  Reactive depression  Will try on Duloxetine 30 mg qd x 2 weeks then 60 mg per day. Recheck in 1 mos  Sacroiliitis (HCC)  Low back pain diffuse. Spinal stenosis suspected. Thermocare pads. Limit motrin to 1200 mg per day  Other orders  -     DULoxetine (CYMBALTA) 30 MG extended release capsule; Take 1 capsule by mouth daily          No follow-ups on file.     Electronically signed by Rodger Juan MD on 5/11/21 at 10:07 AM EDT

## 2021-05-19 ENCOUNTER — OFFICE VISIT (OUTPATIENT)
Dept: FAMILY MEDICINE CLINIC | Age: 71
End: 2021-05-19
Payer: MEDICARE

## 2021-05-19 VITALS
HEIGHT: 69 IN | DIASTOLIC BLOOD PRESSURE: 70 MMHG | RESPIRATION RATE: 18 BRPM | WEIGHT: 228.5 LBS | SYSTOLIC BLOOD PRESSURE: 128 MMHG | OXYGEN SATURATION: 97 % | BODY MASS INDEX: 33.84 KG/M2 | TEMPERATURE: 96.8 F | HEART RATE: 88 BPM

## 2021-05-19 DIAGNOSIS — J02.9 SORE THROAT: ICD-10-CM

## 2021-05-19 DIAGNOSIS — J02.9 PHARYNGITIS, UNSPECIFIED ETIOLOGY: Primary | ICD-10-CM

## 2021-05-19 LAB — S PYO AG THROAT QL: NORMAL

## 2021-05-19 PROCEDURE — G8427 DOCREV CUR MEDS BY ELIG CLIN: HCPCS | Performed by: FAMILY MEDICINE

## 2021-05-19 PROCEDURE — 3017F COLORECTAL CA SCREEN DOC REV: CPT | Performed by: FAMILY MEDICINE

## 2021-05-19 PROCEDURE — 99213 OFFICE O/P EST LOW 20 MIN: CPT | Performed by: FAMILY MEDICINE

## 2021-05-19 PROCEDURE — 1123F ACP DISCUSS/DSCN MKR DOCD: CPT | Performed by: FAMILY MEDICINE

## 2021-05-19 PROCEDURE — G8417 CALC BMI ABV UP PARAM F/U: HCPCS | Performed by: FAMILY MEDICINE

## 2021-05-19 PROCEDURE — 4004F PT TOBACCO SCREEN RCVD TLK: CPT | Performed by: FAMILY MEDICINE

## 2021-05-19 PROCEDURE — 87880 STREP A ASSAY W/OPTIC: CPT | Performed by: FAMILY MEDICINE

## 2021-05-19 PROCEDURE — 4040F PNEUMOC VAC/ADMIN/RCVD: CPT | Performed by: FAMILY MEDICINE

## 2021-05-19 RX ORDER — LIDOCAINE HYDROCHLORIDE 20 MG/ML
10 SOLUTION OROPHARYNGEAL PRN
Qty: 200 ML | Refills: 0 | Status: SHIPPED
Start: 2021-05-19 | End: 2021-06-08

## 2021-05-19 RX ORDER — AMOXICILLIN 875 MG/1
875 TABLET, COATED ORAL 2 TIMES DAILY
Qty: 14 TABLET | Refills: 0 | Status: SHIPPED | OUTPATIENT
Start: 2021-05-19 | End: 2021-05-26

## 2021-05-19 RX ORDER — METHYLPREDNISOLONE 4 MG/1
TABLET ORAL
Qty: 1 KIT | Refills: 0 | Status: SHIPPED
Start: 2021-05-19 | End: 2021-06-08

## 2021-05-19 ASSESSMENT — ENCOUNTER SYMPTOMS
TROUBLE SWALLOWING: 0
RHINORRHEA: 1
COUGH: 1
EYES NEGATIVE: 1
SINUS PAIN: 1
SINUS PRESSURE: 1
SORE THROAT: 1
GASTROINTESTINAL NEGATIVE: 1

## 2021-05-19 NOTE — PROGRESS NOTES
Trino Dial (:  1950) is a 69 y.o. male,Established patient, here for evaluation of the following chief complaint(s):  Pharyngitis         ASSESSMENT/PLAN:  1. Pharyngitis, unspecified etiology  -     amoxicillin (AMOXIL) 875 MG tablet; Take 1 tablet by mouth 2 times daily for 7 days, Disp-14 tablet, R-0Normal  -     methylPREDNISolone (MEDROL DOSEPACK) 4 MG tablet; Take by mouth., Disp-1 kit, R-0Normal  -     lidocaine viscous hcl (XYLOCAINE) 2 % SOLN solution; Take 10 mLs by mouth as needed for Irritation Gargle and spit, Disp-200 mL, R-0Normal  2. Sore throat  -     POCT rapid strep A  -     amoxicillin (AMOXIL) 875 MG tablet; Take 1 tablet by mouth 2 times daily for 7 days, Disp-14 tablet, R-0Normal  -     methylPREDNISolone (MEDROL DOSEPACK) 4 MG tablet; Take by mouth., Disp-1 kit, R-0Normal  -     lidocaine viscous hcl (XYLOCAINE) 2 % SOLN solution; Take 10 mLs by mouth as needed for Irritation Gargle and spit, Disp-200 mL, R-0Normal  Testing negative in office today. We will treat symptomatically. Follow-up with PCP in 1 to 2 weeks for reevaluation. Red flags discussed with patient. If any of these occur he is to go directly to the emergency department. No follow-ups on file. Subjective   SUBJECTIVE/OBJECTIVE:  HPI  Presents today for evaluation of several day history of sore throat, rhinorrhea, posterior nasal drainage, and dry nonproductive cough. Patient denies any fever or chills. Denies any known sick contact or recent travel. Denies any loss of taste or smell. Denies any nausea, vomiting, or diarrhea. Denies any chest pain or shortness of breath. Review of Systems   Constitutional: Negative for fever. HENT: Positive for postnasal drip, rhinorrhea, sinus pressure, sinus pain and sore throat. Negative for trouble swallowing. Eyes: Negative. Respiratory: Positive for cough. Cardiovascular: Negative. Gastrointestinal: Negative.     Musculoskeletal: Negative for neck pain. Skin: Negative for rash. Neurological: Negative for headaches. Hematological: Negative for adenopathy. All other systems reviewed and are negative. Current Outpatient Medications:     amoxicillin (AMOXIL) 875 MG tablet, Take 1 tablet by mouth 2 times daily for 7 days, Disp: 14 tablet, Rfl: 0    methylPREDNISolone (MEDROL DOSEPACK) 4 MG tablet, Take by mouth., Disp: 1 kit, Rfl: 0    lidocaine viscous hcl (XYLOCAINE) 2 % SOLN solution, Take 10 mLs by mouth as needed for Irritation Gargle and spit, Disp: 200 mL, Rfl: 0    tamsulosin (FLOMAX) 0.4 MG capsule, Take 1 capsule by mouth daily, Disp: 90 capsule, Rfl: 1    DULoxetine (CYMBALTA) 30 MG extended release capsule, Take 1 capsule by mouth daily, Disp: 30 capsule, Rfl: 5    ibuprofen (ADVIL;MOTRIN) 400 MG tablet, Take 400 mg by mouth every 6 hours as needed for Pain, Disp: , Rfl:     Red Yeast Rice 600 MG CAPS, Take by mouth, Disp: , Rfl:     Multiple Vitamins-Minerals (CENTRUM SILVER 50+MEN) TABS, Take by mouth, Disp: , Rfl:    Patient Active Problem List   Diagnosis    Disability of walking    Onychomycosis    Pain in joint involving ankle and foot    Pain in limb    Secondary localized osteoarthrosis of ankle and foot    Benign prostatic hyperplasia with urinary frequency    Shoulder arthralgia    Other hyperlipidemia    Anxiety    Reactive depression    Sacroiliitis (HCC)    Pharyngitis     History reviewed. No pertinent past medical history. History reviewed. No pertinent surgical history.   Social History     Socioeconomic History    Marital status:      Spouse name: Not on file    Number of children: Not on file    Years of education: Not on file    Highest education level: Not on file   Occupational History    Not on file   Tobacco Use    Smoking status: Current Some Day Smoker     Packs/day: 0.00     Years: 53.00     Pack years: 0.00     Types: Cigars    Smokeless tobacco: Never Used Vaping Use    Vaping Use: Never used   Substance and Sexual Activity    Alcohol use: Not Currently    Drug use: Never    Sexual activity: Not Currently     Partners: Female   Other Topics Concern    Not on file   Social History Narrative    Not on file     Social Determinants of Health     Financial Resource Strain: Medium Risk    Difficulty of Paying Living Expenses: Somewhat hard   Food Insecurity: No Food Insecurity    Worried About Running Out of Food in the Last Year: Never true    Sajan of Food in the Last Year: Never true   Transportation Needs:     Lack of Transportation (Medical):  Lack of Transportation (Non-Medical):    Physical Activity:     Days of Exercise per Week:     Minutes of Exercise per Session:    Stress:     Feeling of Stress :    Social Connections:     Frequency of Communication with Friends and Family:     Frequency of Social Gatherings with Friends and Family:     Attends Jew Services:     Active Member of Clubs or Organizations:     Attends Club or Organization Meetings:     Marital Status:    Intimate Partner Violence:     Fear of Current or Ex-Partner:     Emotionally Abused:     Physically Abused:     Sexually Abused:      History reviewed. No pertinent family history. Health Maintenance Due   Topic Date Due    Colon cancer screen colonoscopy  Never done     There are no preventive care reminders to display for this patient. There are no preventive care reminders to display for this patient.    Health Maintenance Due   Topic    Shingles Vaccine (1 of 2)    DTaP/Tdap/Td vaccine (2 - Td)      Health Maintenance   Topic Date Due    Shingles Vaccine (1 of 2) Never done    Colon cancer screen colonoscopy  Never done    Annual Wellness Visit (AWV)  Never done    DTaP/Tdap/Td vaccine (2 - Td) 05/12/2021    Lipid screen  09/15/2025    Flu vaccine  Completed    Pneumococcal 65+ years Vaccine  Completed    COVID-19 Vaccine  Completed    AAA screen  Completed    Hepatitis C screen  Completed    Hepatitis A vaccine  Aged Out    Hepatitis B vaccine  Aged Out    Hib vaccine  Aged Out    Meningococcal (ACWY) vaccine  Aged Out      There are no preventive care reminders to display for this patient. There are no preventive care reminders to display for this patient. /70   Pulse 88   Temp 96.8 °F (36 °C)   Resp 18   Ht 5' 9\" (1.753 m)   Wt 228 lb 8 oz (103.6 kg)   SpO2 97%   BMI 33.74 kg/m²     Objective   Physical Exam  Vitals reviewed. Constitutional:       Appearance: He is well-developed. He is ill-appearing. HENT:      Head: Normocephalic and atraumatic. Right Ear: Hearing, tympanic membrane and external ear normal.      Left Ear: Hearing, tympanic membrane and external ear normal.      Nose: Nose normal.      Mouth/Throat:      Dentition: Normal dentition. Pharynx: Posterior oropharyngeal erythema present. No oropharyngeal exudate. Tonsils: No tonsillar exudate. Eyes:      Conjunctiva/sclera: Conjunctivae normal.      Pupils: Pupils are equal, round, and reactive to light. Cardiovascular:      Rate and Rhythm: Normal rate and regular rhythm. Heart sounds: Normal heart sounds. No murmur heard. Pulmonary:      Effort: Pulmonary effort is normal. No respiratory distress. Breath sounds: Normal breath sounds. No wheezing. Abdominal:      General: Bowel sounds are normal. There is no distension. Palpations: Abdomen is soft. Musculoskeletal:         General: Normal range of motion. Cervical back: Normal range of motion and neck supple. Lymphadenopathy:      Cervical: Cervical adenopathy present. Skin:     General: Skin is warm and dry. Findings: No erythema or rash. Neurological:      Mental Status: He is alert and oriented to person, place, and time. Cranial Nerves: No cranial nerve deficit.       Gait: Gait abnormal.   Psychiatric:         Behavior: Behavior normal.

## 2021-06-08 ENCOUNTER — OFFICE VISIT (OUTPATIENT)
Dept: FAMILY MEDICINE CLINIC | Age: 71
End: 2021-06-08
Payer: MEDICARE

## 2021-06-08 DIAGNOSIS — F32.9 REACTIVE DEPRESSION: Primary | ICD-10-CM

## 2021-06-08 PROCEDURE — 1123F ACP DISCUSS/DSCN MKR DOCD: CPT | Performed by: FAMILY MEDICINE

## 2021-06-08 PROCEDURE — 4040F PNEUMOC VAC/ADMIN/RCVD: CPT | Performed by: FAMILY MEDICINE

## 2021-06-08 PROCEDURE — 99213 OFFICE O/P EST LOW 20 MIN: CPT | Performed by: FAMILY MEDICINE

## 2021-06-08 PROCEDURE — G8417 CALC BMI ABV UP PARAM F/U: HCPCS | Performed by: FAMILY MEDICINE

## 2021-06-08 PROCEDURE — G8427 DOCREV CUR MEDS BY ELIG CLIN: HCPCS | Performed by: FAMILY MEDICINE

## 2021-06-08 PROCEDURE — 4004F PT TOBACCO SCREEN RCVD TLK: CPT | Performed by: FAMILY MEDICINE

## 2021-06-08 PROCEDURE — 3017F COLORECTAL CA SCREEN DOC REV: CPT | Performed by: FAMILY MEDICINE

## 2021-06-08 ASSESSMENT — ENCOUNTER SYMPTOMS
SINUS PAIN: 0
DIARRHEA: 0
WHEEZING: 0
BLOOD IN STOOL: 0
VOMITING: 0
ABDOMINAL PAIN: 0
COUGH: 0
BACK PAIN: 1
TROUBLE SWALLOWING: 0
SORE THROAT: 0
EYE REDNESS: 0
PHOTOPHOBIA: 0
SHORTNESS OF BREATH: 0
CONSTIPATION: 0

## 2021-06-08 NOTE — PROGRESS NOTES
OFFICE NOTE    6/8/21  Name: Marleny Chau  RZQ:[birth year]   Sex:male   Age:70 y.o. SUBJECTIVE  Chief Complaint   Patient presents with    Depression     only took meds for 1 week, head spun and got tired       Patient presents for follow up post medication adjustment. States medication made his head spin and then wanted to go back to bed. Only took for one week. Review of Systems   Constitutional: Positive for fatigue. Negative for appetite change, fever and unexpected weight change. HENT: Negative for congestion, ear pain, hearing loss, sinus pain, sore throat and trouble swallowing. Eyes: Negative for photophobia, redness and visual disturbance. Respiratory: Negative for cough, shortness of breath and wheezing. Cardiovascular: Negative for chest pain, palpitations and leg swelling. Gastrointestinal: Negative for abdominal pain, blood in stool, constipation, diarrhea and vomiting. Endocrine: Negative for cold intolerance, polydipsia and polyuria. Genitourinary: Negative for difficulty urinating, genital sores, hematuria and urgency. Musculoskeletal: Positive for back pain and gait problem. Negative for arthralgias and joint swelling. Allergic/Immunologic: Negative for food allergies. Neurological: Negative for dizziness, tremors, syncope and headaches. Hematological: Negative for adenopathy. Does not bruise/bleed easily. Psychiatric/Behavioral: Negative for agitation, dysphoric mood, hallucinations and sleep disturbance.             Current Outpatient Medications:     tamsulosin (FLOMAX) 0.4 MG capsule, Take 1 capsule by mouth daily, Disp: 90 capsule, Rfl: 1    DULoxetine (CYMBALTA) 30 MG extended release capsule, Take 1 capsule by mouth daily, Disp: 30 capsule, Rfl: 5    ibuprofen (ADVIL;MOTRIN) 400 MG tablet, Take 400 mg by mouth every 6 hours as needed for Pain, Disp: , Rfl:     Red Yeast Rice 600 MG CAPS, Take by mouth, Disp: , Rfl:     Multiple Vitamins-Minerals (CENTRUM SILVER 50+MEN) TABS, Take by mouth, Disp: , Rfl:   Allergies   Allergen Reactions    Atorvastatin Calcium Other (See Comments)     Leg cramps      Ezetimibe-Simvastatin Other (See Comments)     Leg pain/cramping       No past medical history on file. No past surgical history on file. No family history on file.   Social History     Tobacco History     Smoking Status  Current Some Day Smoker Smoking Frequency  0 packs/day for 53 years (0 pk yrs) Smoking Tobacco Type  Cigars    Smokeless Tobacco Use  Never Used          Alcohol History     Alcohol Use Status  Not Currently          Drug Use     Drug Use Status  Never          Sexual Activity     Sexually Active  Not Currently Partners  Female

## 2021-06-08 NOTE — PROGRESS NOTES
every 6 hours as needed for Pain, Disp: , Rfl:     Red Yeast Rice 600 MG CAPS, Take by mouth, Disp: , Rfl:     Multiple Vitamins-Minerals (CENTRUM SILVER 50+MEN) TABS, Take by mouth, Disp: , Rfl:   Allergies   Allergen Reactions    Atorvastatin Calcium Other (See Comments)     Leg cramps      Ezetimibe-Simvastatin Other (See Comments)     Leg pain/cramping       No past medical history on file. No past surgical history on file. No family history on file. Social History     Tobacco History     Smoking Status  Current Some Day Smoker Smoking Frequency  0 packs/day for 53 years (0 pk yrs) Smoking Tobacco Type  Cigars    Smokeless Tobacco Use  Never Used          Alcohol History     Alcohol Use Status  Not Currently          Drug Use     Drug Use Status  Never          Sexual Activity     Sexually Active  Not Currently Partners  Female                OBJECTIVE  Vitals:    06/08/21 1603 06/08/21 1640   BP: (!) 156/88 135/88   Site: Left Upper Arm    Position: Sitting    Pulse: 88    Temp: 97.8 °F (36.6 °C)    TempSrc: Temporal    SpO2: 98%    Weight: 233 lb (105.7 kg)         Body mass index is 34.41 kg/m². Orders Placed This Encounter   Procedures    External Referral To Psychology     Referral Priority:   Routine     Referral Type:   Eval and Treat     Referral Reason:   Specialty Services Required     Requested Specialty:   Psychology     Number of Visits Requested:   1        EXAM   Physical Exam  Vitals and nursing note reviewed. Constitutional:       Appearance: Normal appearance. He is well-developed. He is obese. HENT:      Right Ear: Tympanic membrane, ear canal and external ear normal.      Left Ear: Tympanic membrane, ear canal and external ear normal.      Nose: Nose normal. No congestion. Mouth/Throat:      Pharynx: Oropharynx is clear. No posterior oropharyngeal erythema. Eyes:      General: Scleral icterus present.       Conjunctiva/sclera: Conjunctivae normal.      Pupils: Pupils are

## 2021-06-09 VITALS
TEMPERATURE: 97.8 F | WEIGHT: 233 LBS | DIASTOLIC BLOOD PRESSURE: 88 MMHG | OXYGEN SATURATION: 98 % | HEART RATE: 88 BPM | SYSTOLIC BLOOD PRESSURE: 135 MMHG | BODY MASS INDEX: 34.41 KG/M2

## 2021-06-09 ASSESSMENT — ENCOUNTER SYMPTOMS
VOMITING: 0
ABDOMINAL PAIN: 0
CONSTIPATION: 1
WHEEZING: 0
DIARRHEA: 0
BACK PAIN: 0
SORE THROAT: 0
PHOTOPHOBIA: 0
TROUBLE SWALLOWING: 0
SINUS PAIN: 0
COUGH: 0
BLOOD IN STOOL: 0
SHORTNESS OF BREATH: 0
EYE REDNESS: 0

## 2021-07-26 ENCOUNTER — TELEPHONE (OUTPATIENT)
Dept: FAMILY MEDICINE CLINIC | Age: 71
End: 2021-07-26

## 2021-07-26 ENCOUNTER — NURSE TRIAGE (OUTPATIENT)
Dept: OTHER | Facility: CLINIC | Age: 71
End: 2021-07-26

## 2021-07-26 NOTE — TELEPHONE ENCOUNTER
Katty Colon has a rash on his hand. He cannot come to walk in today. He is aware it is recommended he be seen today, but states he will come to walk in tomorrow morning.

## 2021-07-26 NOTE — TELEPHONE ENCOUNTER
Reason for Disposition   Neurologic deficit of gradual onset, ANY of the following: * Weakness of the face, arm, or leg on one side of the body * Numbness of the face, arm, or leg on one side of the body * Loss of speech or garbled speech    Answer Assessment - Initial Assessment Questions  1. SYMPTOM: \"What is the main symptom you are concerned about? \" (e.g., weakness, numbness)      When pt wakes his fingers are folded down and it takes time to open the L hand up, sharp pains at times, started out with severe itching in that hand and is now intermittent  Numbness at times           At night the L shoulder hurts at times too          Now the right hand is starting to itch     2. ONSET: \"When did this start? \" (minutes, hours, days; while sleeping)         A couple of weeks ago     3. LAST NORMAL: \"When was the last time you were normal (no symptoms)? \"         A couple of weeks ago     4. PATTERN \"Does this come and go, or has it been constant since it started? \"  \"Is it present now? \"          Intermittent     5. CARDIAC SYMPTOMS: \"Have you had any of the following symptoms: chest pain, difficulty breathing, palpitations? \"          Denies all     6. NEUROLOGIC SYMPTOMS: \"Have you had any of the following symptoms: headache, dizziness, vision loss, double vision, changes in speech, unsteady on your feet? \"           Denies all     7. OTHER SYMPTOMS: \"Do you have any other symptoms? \"          The pain in shoulder wakes pt out of his sleep, then it goes away in the morning and no pain during the day     8. PREGNANCY: \"Is there any chance you are pregnant? \" \"When was your last menstrual period? \"          NA    Protocols used: NEUROLOGIC DEFICIT-ADULT-[place]    Received call from Georgina  at [facility] with Red Flag Complaint. Brief description of triage: see above     Triage indicates for patient to be seen NOW for intermittent numbness and tingling in L hand with pain in L shoulder at night.  Pt advised to go to ED/UCC if no appts available. Care advice provided, patient verbalizes understanding; denies any other questions or concerns; instructed to call back for any new or worsening symptoms. Writer provided warm transfer to Stipple  at [facility] for appointment scheduling. Attention Provider: Thank you for allowing me to participate in the care of your patient. The patient was connected to triage in response to information provided to the ECC. Please do not respond through this encounter as the response is not directed to a shared pool.

## 2021-07-27 ENCOUNTER — OFFICE VISIT (OUTPATIENT)
Dept: FAMILY MEDICINE CLINIC | Age: 71
End: 2021-07-27
Payer: MEDICARE

## 2021-07-27 VITALS
OXYGEN SATURATION: 97 % | HEART RATE: 77 BPM | SYSTOLIC BLOOD PRESSURE: 132 MMHG | WEIGHT: 232 LBS | BODY MASS INDEX: 34.36 KG/M2 | DIASTOLIC BLOOD PRESSURE: 68 MMHG | TEMPERATURE: 96.8 F | RESPIRATION RATE: 18 BRPM | HEIGHT: 69 IN

## 2021-07-27 DIAGNOSIS — M19.042 ARTHRITIS OF BOTH HANDS: ICD-10-CM

## 2021-07-27 DIAGNOSIS — R20.2 TINGLING OF LEFT UPPER EXTREMITY: Primary | ICD-10-CM

## 2021-07-27 DIAGNOSIS — M19.041 ARTHRITIS OF BOTH HANDS: ICD-10-CM

## 2021-07-27 PROCEDURE — G8427 DOCREV CUR MEDS BY ELIG CLIN: HCPCS | Performed by: NURSE PRACTITIONER

## 2021-07-27 PROCEDURE — 4040F PNEUMOC VAC/ADMIN/RCVD: CPT | Performed by: NURSE PRACTITIONER

## 2021-07-27 PROCEDURE — 96372 THER/PROPH/DIAG INJ SC/IM: CPT | Performed by: NURSE PRACTITIONER

## 2021-07-27 PROCEDURE — 4004F PT TOBACCO SCREEN RCVD TLK: CPT | Performed by: NURSE PRACTITIONER

## 2021-07-27 PROCEDURE — 1123F ACP DISCUSS/DSCN MKR DOCD: CPT | Performed by: NURSE PRACTITIONER

## 2021-07-27 PROCEDURE — G8417 CALC BMI ABV UP PARAM F/U: HCPCS | Performed by: NURSE PRACTITIONER

## 2021-07-27 PROCEDURE — 3017F COLORECTAL CA SCREEN DOC REV: CPT | Performed by: NURSE PRACTITIONER

## 2021-07-27 PROCEDURE — 99213 OFFICE O/P EST LOW 20 MIN: CPT | Performed by: NURSE PRACTITIONER

## 2021-07-27 RX ORDER — PREDNISONE 10 MG/1
TABLET ORAL
Qty: 18 TABLET | Refills: 0 | Status: SHIPPED | OUTPATIENT
Start: 2021-07-27 | End: 2021-08-05

## 2021-07-27 RX ORDER — METHYLPREDNISOLONE ACETATE 40 MG/ML
40 INJECTION, SUSPENSION INTRA-ARTICULAR; INTRALESIONAL; INTRAMUSCULAR; SOFT TISSUE ONCE
Status: COMPLETED | OUTPATIENT
Start: 2021-07-27 | End: 2021-07-27

## 2021-07-27 RX ADMIN — METHYLPREDNISOLONE ACETATE 40 MG: 40 INJECTION, SUSPENSION INTRA-ARTICULAR; INTRALESIONAL; INTRAMUSCULAR; SOFT TISSUE at 09:02

## 2021-07-27 ASSESSMENT — ENCOUNTER SYMPTOMS
ABDOMINAL PAIN: 0
NAUSEA: 0
DIARRHEA: 0
CONSTIPATION: 0
SHORTNESS OF BREATH: 0
WHEEZING: 0
CHEST TIGHTNESS: 0
VOMITING: 0
COUGH: 0

## 2021-07-27 NOTE — PROGRESS NOTES
dizziness, weakness, light-headedness and numbness. Tingling down left arm intermittently   Psychiatric/Behavioral: The patient is not nervous/anxious. Patient's medical, social, and family history reviewed    Past Medical History:  has no past medical history on file. Past Surgical History:  has no past surgical history on file. Social History:  reports that he has been smoking cigars. He has been smoking about 0.00 packs per day for the past 53.00 years. He has never used smokeless tobacco. He reports previous alcohol use. He reports that he does not use drugs. Family History: family history is not on file. Allergies: Atorvastatin calcium and Ezetimibe-simvastatin    Physical Exam   Vital Signs:  /68   Pulse 77   Temp 96.8 °F (36 °C)   Resp 18   Ht 5' 9\" (1.753 m)   Wt 232 lb (105.2 kg)   SpO2 97%   BMI 34.26 kg/m²    Oxygen Saturation Interpretation: Normal.    Physical Exam  Vitals and nursing note reviewed. Constitutional:       Appearance: Normal appearance. He is well-developed. He is not toxic-appearing. HENT:      Head: Normocephalic and atraumatic. Right Ear: Hearing normal.      Left Ear: Hearing normal.      Nose: Nose normal.      Mouth/Throat:      Lips: Pink. Mouth: Mucous membranes are moist.      Pharynx: Oropharynx is clear. Uvula midline. Eyes:      General: Lids are normal.      Conjunctiva/sclera: Conjunctivae normal.      Pupils: Pupils are equal, round, and reactive to light. Neck:      Trachea: Trachea normal.   Cardiovascular:      Rate and Rhythm: Normal rate and regular rhythm. Pulses: Normal pulses. Heart sounds: Normal heart sounds. Pulmonary:      Effort: Pulmonary effort is normal.      Breath sounds: Normal breath sounds. Abdominal:      General: Abdomen is flat. Bowel sounds are normal.      Palpations: Abdomen is soft. Musculoskeletal:         General: Normal range of motion. Cervical back: Normal range of motion. Lymphadenopathy:      Cervical: No cervical adenopathy. Skin:     General: Skin is warm and dry. Capillary Refill: Capillary refill takes less than 2 seconds. Neurological:      Mental Status: He is alert and oriented to person, place, and time. Psychiatric:         Attention and Perception: Attention normal.         Mood and Affect: Mood normal.         Speech: Speech normal.         Behavior: Behavior normal.         Thought Content: Thought content normal.         Cognition and Memory: Cognition normal.         Judgment: Judgment normal.       Test Results Section   (All laboratory and radiology results have been personally reviewed by myself)  Labs:  No results found for this visit on 07/27/21. Imaging: All Radiology results interpreted by Radiologist unless otherwise noted. No results found. Medical Decision Making   MDM: 69-year-old male who presents today for bilateral hand stiffness and itching and tingling down his left arm. He has no focal neurological deficits. Patient was advised his hands are consistent with an arthritis and possible inflammation around the nerve from his shoulder down his left arm. Therefore we will give him Depo-Medrol injection in office. He will have prednisone to go home with. He will follow up with his PCP in the next 1 to 2 weeks if his symptoms persist.  ER if symptoms change or worsen. Red flag symptoms discussed with patient. Patient verbalized understanding and is agreeable to plan of care. All questions answered. Assessment / Plan   Impression(s):  Lotus Can was seen today for hand pain. Diagnoses and all orders for this visit:    Tingling of left upper extremity  -     methylPREDNISolone acetate (DEPO-MEDROL) injection 40 mg  -     predniSONE (DELTASONE) 10 MG tablet; Take 3 tablets by mouth daily for 3 days, THEN 2 tablets daily for 3 days, THEN 1 tablet daily for 3 days.     Arthritis of both hands  -     methylPREDNISolone acetate (DEPO-MEDROL) injection 40 mg  -     predniSONE (DELTASONE) 10 MG tablet; Take 3 tablets by mouth daily for 3 days, THEN 2 tablets daily for 3 days, THEN 1 tablet daily for 3 days. Discharged home. Patient condition is good    Return if symptoms worsen or fail to improve. New Medications     New Prescriptions    PREDNISONE (DELTASONE) 10 MG TABLET    Take 3 tablets by mouth daily for 3 days, THEN 2 tablets daily for 3 days, THEN 1 tablet daily for 3 days. Electronically signed by ANA Alvarado CNP   DD: 7/27/21    **This report was transcribed using voice recognition software. Every effort was made to ensure accuracy; however, inadvertent computerized transcription errors may be present.

## 2021-09-07 ENCOUNTER — OFFICE VISIT (OUTPATIENT)
Dept: FAMILY MEDICINE CLINIC | Age: 71
End: 2021-09-07
Payer: MEDICARE

## 2021-09-07 VITALS
DIASTOLIC BLOOD PRESSURE: 84 MMHG | HEART RATE: 83 BPM | SYSTOLIC BLOOD PRESSURE: 146 MMHG | TEMPERATURE: 97.7 F | WEIGHT: 229.8 LBS | OXYGEN SATURATION: 98 % | BODY MASS INDEX: 33.94 KG/M2

## 2021-09-07 DIAGNOSIS — R35.0 BENIGN PROSTATIC HYPERPLASIA WITH URINARY FREQUENCY: ICD-10-CM

## 2021-09-07 DIAGNOSIS — N40.1 BENIGN PROSTATIC HYPERPLASIA WITH URINARY FREQUENCY: ICD-10-CM

## 2021-09-07 PROCEDURE — 1123F ACP DISCUSS/DSCN MKR DOCD: CPT | Performed by: FAMILY MEDICINE

## 2021-09-07 PROCEDURE — 3017F COLORECTAL CA SCREEN DOC REV: CPT | Performed by: FAMILY MEDICINE

## 2021-09-07 PROCEDURE — 4004F PT TOBACCO SCREEN RCVD TLK: CPT | Performed by: FAMILY MEDICINE

## 2021-09-07 PROCEDURE — G8427 DOCREV CUR MEDS BY ELIG CLIN: HCPCS | Performed by: FAMILY MEDICINE

## 2021-09-07 PROCEDURE — G8417 CALC BMI ABV UP PARAM F/U: HCPCS | Performed by: FAMILY MEDICINE

## 2021-09-07 PROCEDURE — 99213 OFFICE O/P EST LOW 20 MIN: CPT | Performed by: FAMILY MEDICINE

## 2021-09-07 PROCEDURE — 4040F PNEUMOC VAC/ADMIN/RCVD: CPT | Performed by: FAMILY MEDICINE

## 2021-09-07 RX ORDER — CELECOXIB 200 MG/1
200 CAPSULE ORAL DAILY
Qty: 30 CAPSULE | Refills: 5 | Status: SHIPPED
Start: 2021-09-07 | End: 2022-02-02 | Stop reason: SDUPTHER

## 2021-09-07 RX ORDER — DULOXETIN HYDROCHLORIDE 30 MG/1
30 CAPSULE, DELAYED RELEASE ORAL DAILY
Qty: 90 CAPSULE | Refills: 1 | Status: CANCELLED | OUTPATIENT
Start: 2021-09-07

## 2021-09-07 RX ORDER — TAMSULOSIN HYDROCHLORIDE 0.4 MG/1
0.8 CAPSULE ORAL DAILY
Qty: 180 CAPSULE | Refills: 1 | Status: SHIPPED
Start: 2021-09-07 | End: 2022-02-02 | Stop reason: SDUPTHER

## 2021-09-07 NOTE — PROGRESS NOTES
OFFICE NOTE    9/7/21  Name: Zarina Mackey  TIL:0[id number]   Sex:male   Age:71 y.o. SUBJECTIVE  Chief Complaint   Patient presents with    Depression       HPI comes in for f/u. Has been seen at [facility] 6 times. Thinks he has made some progress but basically has same complaints of no ambition. Beginning to tie in with frustration at wife's health and mental issues but not solution evident at this time    Review of Systems   Neurogenic claudication. Napping. Poor ambition poor exercise tolerance. moodiness    Current Outpatient Medications:     tamsulosin (FLOMAX) 0.4 MG capsule, Take 2 capsules by mouth daily With food, Disp: 180 capsule, Rfl: 1    celecoxib (CELEBREX) 200 MG capsule, Take 1 capsule by mouth daily, Disp: 30 capsule, Rfl: 5    DULoxetine (CYMBALTA) 30 MG extended release capsule, Take 1 capsule by mouth daily, Disp: 30 capsule, Rfl: 5    ibuprofen (ADVIL;MOTRIN) 400 MG tablet, Take 400 mg by mouth every 6 hours as needed for Pain, Disp: , Rfl:     Red Yeast Rice 600 MG CAPS, Take by mouth, Disp: , Rfl:     Multiple Vitamins-Minerals (CENTRUM SILVER 50+MEN) TABS, Take by mouth, Disp: , Rfl:   Allergies   Allergen Reactions    Atorvastatin Calcium Other (See Comments)     Leg cramps      Ezetimibe-Simvastatin Other (See Comments)     Leg pain/cramping       No past medical history on file. No past surgical history on file. No family history on file.   Social History     Tobacco History     Smoking Status  Current Some Day Smoker Smoking Frequency  0 packs/day for 53 years (0 pk yrs) Smoking Tobacco Type  Cigars    Smokeless Tobacco Use  Never Used          Alcohol History     Alcohol Use Status  Not Currently          Drug Use     Drug Use Status  Never          Sexual Activity     Sexually Active  Not Currently Partners  Female                OBJECTIVE  Vitals:    09/07/21 1633   BP: (!) 146/84   Site: Right Upper Arm   Position: Sitting   Pulse: 83   Temp: 97.7 °F (36.5 °C) TempSrc: Temporal   SpO2: 98%   Weight: 229 lb 12.8 oz (104.2 kg)        Body mass index is 33.94 kg/m². No orders of the defined types were placed in this encounter. EXAM   Physical Exam  Vitals and nursing note reviewed. Constitutional:       Appearance: Normal appearance. He is obese. Cardiovascular:      Rate and Rhythm: Normal rate and regular rhythm. Heart sounds: No murmur heard. Pulmonary:      Effort: Pulmonary effort is normal.      Breath sounds: Normal breath sounds. Abdominal:      General: Bowel sounds are normal.      Palpations: There is no mass. Tenderness: There is no abdominal tenderness. Skin:     Coloration: Skin is not jaundiced. Findings: No bruising or rash. Neurological:      General: No focal deficit present. Mental Status: He is alert and oriented to person, place, and time. Psychiatric:         Mood and Affect: Mood normal.         Behavior: Behavior normal.           Ac Duffy was seen today for depression. Diagnoses and all orders for this visit:    Benign prostatic hyperplasia with urinary frequency  -     tamsulosin (FLOMAX) 0.4 MG capsule; Take 2 capsules by mouth daily With food    Other orders  -     celecoxib (CELEBREX) 200 MG capsule; Take 1 capsule by mouth daily     reports he sleeps poorly. Has reports of back pain and left knee pain. BP borderline. Will increase flomax to 0.8 po qd at supper. May reduce BP some and reduce nocturia. Will replace ibuprofen with Celecoxib 200 mg per days which may also improve BP be more effective and is safer in my opinion      No follow-ups on file.     Electronically signed by Diane Gage MD on 9/7/21 at 5:12 PM EDT

## 2021-09-10 DIAGNOSIS — E78.49 OTHER HYPERLIPIDEMIA: Primary | ICD-10-CM

## 2021-09-27 ENCOUNTER — OFFICE VISIT (OUTPATIENT)
Dept: FAMILY MEDICINE CLINIC | Age: 71
End: 2021-09-27
Payer: MEDICARE

## 2021-09-27 VITALS
OXYGEN SATURATION: 98 % | WEIGHT: 227 LBS | SYSTOLIC BLOOD PRESSURE: 136 MMHG | DIASTOLIC BLOOD PRESSURE: 82 MMHG | TEMPERATURE: 97.1 F | BODY MASS INDEX: 33.52 KG/M2 | HEART RATE: 86 BPM

## 2021-09-27 DIAGNOSIS — R53.83 OTHER FATIGUE: ICD-10-CM

## 2021-09-27 DIAGNOSIS — R06.02 SHORTNESS OF BREATH: Primary | ICD-10-CM

## 2021-09-27 PROCEDURE — 3017F COLORECTAL CA SCREEN DOC REV: CPT | Performed by: STUDENT IN AN ORGANIZED HEALTH CARE EDUCATION/TRAINING PROGRAM

## 2021-09-27 PROCEDURE — G8417 CALC BMI ABV UP PARAM F/U: HCPCS | Performed by: STUDENT IN AN ORGANIZED HEALTH CARE EDUCATION/TRAINING PROGRAM

## 2021-09-27 PROCEDURE — 4040F PNEUMOC VAC/ADMIN/RCVD: CPT | Performed by: STUDENT IN AN ORGANIZED HEALTH CARE EDUCATION/TRAINING PROGRAM

## 2021-09-27 PROCEDURE — 4004F PT TOBACCO SCREEN RCVD TLK: CPT | Performed by: STUDENT IN AN ORGANIZED HEALTH CARE EDUCATION/TRAINING PROGRAM

## 2021-09-27 PROCEDURE — G8427 DOCREV CUR MEDS BY ELIG CLIN: HCPCS | Performed by: STUDENT IN AN ORGANIZED HEALTH CARE EDUCATION/TRAINING PROGRAM

## 2021-09-27 PROCEDURE — 1123F ACP DISCUSS/DSCN MKR DOCD: CPT | Performed by: STUDENT IN AN ORGANIZED HEALTH CARE EDUCATION/TRAINING PROGRAM

## 2021-09-27 PROCEDURE — 99213 OFFICE O/P EST LOW 20 MIN: CPT | Performed by: STUDENT IN AN ORGANIZED HEALTH CARE EDUCATION/TRAINING PROGRAM

## 2021-09-27 ASSESSMENT — ENCOUNTER SYMPTOMS
RHINORRHEA: 0
ABDOMINAL PAIN: 0
NAUSEA: 0
SHORTNESS OF BREATH: 1
WHEEZING: 0
VOMITING: 0
COUGH: 0

## 2021-09-27 NOTE — PROGRESS NOTES
Parminder Draper (:  1950) is a 66 y.o. male,Established patient, here for evaluation of the following chief complaint(s):  Fatigue         ASSESSMENT/PLAN:  1. Shortness of breath  -     CBC WITH AUTO DIFFERENTIAL; Future  -     TSH; Future  -     XR CHEST STANDARD (2 VW); Future  -     BRAIN NATRIURETIC PEPTIDE; Future  2. Other fatigue  -     CBC WITH AUTO DIFFERENTIAL; Future  -     TSH; Future  -     XR CHEST STANDARD (2 VW); Future    Uncertain etiology at this point. Sudden onset is peculiar. O2 saturation was normal when ambulating. He has no LE edema or orthopnea. PE unlikely due to to normal oxygenation and no tachycardia. Physical exam and history not consistent with heart failure, but will check a BNP for baseline. There is no wheezing on exam Recheck CBC to eval for anemia-recent one was normal but has been having difficulty before then. TSH check as well. Advised he follow up in the next few weeks with his PCP and discussed return/Er precautions. Return in about 1 week (around 10/4/2021) for fatigue, SOB. Subjective   SUBJECTIVE/OBJECTIVE:  Noticed he was very weak last Saturday.  -noticing he has a hard time catching his breath when ambulated  -gets very short of breath  -was worried about his Bp but it is normal today in office  -Denies chest pain  -No fever  -Occasional mild headache  -Denies history of asthma and COPD, but has used an inhaler in the past. He took a puff the other day which did not help much  -Usually feels a little better after laying down  -No swelling in his legs  -uses 2 pillows which has not changed recently. He usually sleeps on his side  -Occasional cough  -Denies bleeding again      Review of Systems   Constitutional: Positive for fatigue. Negative for chills and fever. HENT: Negative for congestion and rhinorrhea. Respiratory: Positive for shortness of breath. Negative for cough and wheezing.     Cardiovascular: Negative for chest pain, palpitations and leg swelling. Gastrointestinal: Negative for abdominal pain, nausea and vomiting. Genitourinary: Negative for dysuria and hematuria. Musculoskeletal: Negative for arthralgias and myalgias. Skin: Negative for rash and wound. Neurological: Negative for dizziness and light-headedness. Objective   Physical Exam  Vitals reviewed. Constitutional:       General: He is not in acute distress. HENT:      Head: Normocephalic and atraumatic. Eyes:      Extraocular Movements: Extraocular movements intact. Conjunctiva/sclera: Conjunctivae normal.   Cardiovascular:      Rate and Rhythm: Normal rate and regular rhythm. Pulmonary:      Effort: Pulmonary effort is normal.      Breath sounds: Normal breath sounds. No wheezing. Abdominal:      General: Abdomen is flat. There is no distension. Musculoskeletal:         General: No swelling, tenderness or deformity. Right lower leg: No edema. Left lower leg: No edema. Neurological:      General: No focal deficit present. Mental Status: He is alert and oriented to person, place, and time. An electronic signature was used to authenticate this note.     --Janessa Kennedy MD

## 2021-10-07 ENCOUNTER — OFFICE VISIT (OUTPATIENT)
Dept: FAMILY MEDICINE CLINIC | Age: 71
End: 2021-10-07
Payer: MEDICARE

## 2021-10-07 VITALS
TEMPERATURE: 97.7 F | SYSTOLIC BLOOD PRESSURE: 136 MMHG | HEART RATE: 90 BPM | OXYGEN SATURATION: 96 % | DIASTOLIC BLOOD PRESSURE: 82 MMHG

## 2021-10-07 DIAGNOSIS — F43.23 ADJUSTMENT DISORDER WITH MIXED ANXIETY AND DEPRESSED MOOD: ICD-10-CM

## 2021-10-07 DIAGNOSIS — R35.0 BENIGN PROSTATIC HYPERPLASIA WITH URINARY FREQUENCY: Primary | ICD-10-CM

## 2021-10-07 DIAGNOSIS — F32.9 REACTIVE DEPRESSION: ICD-10-CM

## 2021-10-07 DIAGNOSIS — E78.49 OTHER HYPERLIPIDEMIA: ICD-10-CM

## 2021-10-07 DIAGNOSIS — N40.1 BENIGN PROSTATIC HYPERPLASIA WITH URINARY FREQUENCY: Primary | ICD-10-CM

## 2021-10-07 PROCEDURE — G8427 DOCREV CUR MEDS BY ELIG CLIN: HCPCS | Performed by: FAMILY MEDICINE

## 2021-10-07 PROCEDURE — 99213 OFFICE O/P EST LOW 20 MIN: CPT | Performed by: FAMILY MEDICINE

## 2021-10-07 PROCEDURE — G0008 ADMIN INFLUENZA VIRUS VAC: HCPCS | Performed by: FAMILY MEDICINE

## 2021-10-07 PROCEDURE — 1123F ACP DISCUSS/DSCN MKR DOCD: CPT | Performed by: FAMILY MEDICINE

## 2021-10-07 PROCEDURE — G8484 FLU IMMUNIZE NO ADMIN: HCPCS | Performed by: FAMILY MEDICINE

## 2021-10-07 PROCEDURE — 90694 VACC AIIV4 NO PRSRV 0.5ML IM: CPT | Performed by: FAMILY MEDICINE

## 2021-10-07 PROCEDURE — G8417 CALC BMI ABV UP PARAM F/U: HCPCS | Performed by: FAMILY MEDICINE

## 2021-10-07 PROCEDURE — 3017F COLORECTAL CA SCREEN DOC REV: CPT | Performed by: FAMILY MEDICINE

## 2021-10-07 PROCEDURE — 4004F PT TOBACCO SCREEN RCVD TLK: CPT | Performed by: FAMILY MEDICINE

## 2021-10-07 PROCEDURE — 4040F PNEUMOC VAC/ADMIN/RCVD: CPT | Performed by: FAMILY MEDICINE

## 2021-10-07 ASSESSMENT — ENCOUNTER SYMPTOMS
SHORTNESS OF BREATH: 1
BACK PAIN: 1
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
## 2022-01-20 ENCOUNTER — OFFICE VISIT (OUTPATIENT)
Dept: FAMILY MEDICINE CLINIC | Age: 72
End: 2022-01-20
Payer: MEDICARE

## 2022-01-20 VITALS
HEIGHT: 69 IN | WEIGHT: 234 LBS | SYSTOLIC BLOOD PRESSURE: 126 MMHG | OXYGEN SATURATION: 97 % | RESPIRATION RATE: 18 BRPM | BODY MASS INDEX: 34.66 KG/M2 | DIASTOLIC BLOOD PRESSURE: 80 MMHG | HEART RATE: 82 BPM | TEMPERATURE: 98.4 F

## 2022-01-20 DIAGNOSIS — J06.9 VIRAL URI: Primary | ICD-10-CM

## 2022-01-20 DIAGNOSIS — Z20.822 ENCOUNTER FOR LABORATORY TESTING FOR COVID-19 VIRUS: ICD-10-CM

## 2022-01-20 LAB
Lab: NORMAL
QC PASS/FAIL: NORMAL
SARS-COV-2 RDRP RESP QL NAA+PROBE: NEGATIVE

## 2022-01-20 PROCEDURE — G8427 DOCREV CUR MEDS BY ELIG CLIN: HCPCS | Performed by: NURSE PRACTITIONER

## 2022-01-20 PROCEDURE — 4040F PNEUMOC VAC/ADMIN/RCVD: CPT | Performed by: NURSE PRACTITIONER

## 2022-01-20 PROCEDURE — 4004F PT TOBACCO SCREEN RCVD TLK: CPT | Performed by: NURSE PRACTITIONER

## 2022-01-20 PROCEDURE — 99213 OFFICE O/P EST LOW 20 MIN: CPT | Performed by: NURSE PRACTITIONER

## 2022-01-20 PROCEDURE — G8417 CALC BMI ABV UP PARAM F/U: HCPCS | Performed by: NURSE PRACTITIONER

## 2022-01-20 PROCEDURE — 1123F ACP DISCUSS/DSCN MKR DOCD: CPT | Performed by: NURSE PRACTITIONER

## 2022-01-20 PROCEDURE — 87635 SARS-COV-2 COVID-19 AMP PRB: CPT | Performed by: NURSE PRACTITIONER

## 2022-01-20 PROCEDURE — G8484 FLU IMMUNIZE NO ADMIN: HCPCS | Performed by: NURSE PRACTITIONER

## 2022-01-20 PROCEDURE — 3017F COLORECTAL CA SCREEN DOC REV: CPT | Performed by: NURSE PRACTITIONER

## 2022-01-20 RX ORDER — GUAIFENESIN 600 MG/1
600 TABLET, EXTENDED RELEASE ORAL 2 TIMES DAILY
Qty: 30 TABLET | Refills: 0 | Status: SHIPPED | OUTPATIENT
Start: 2022-01-20 | End: 2022-02-04

## 2022-01-20 NOTE — PROGRESS NOTES
Chief Complaint   Cough (x three days ) and Nasal Congestion    History of Present Illness   Source of history provided by:  patient. Sylvan Bloch is a 66 y.o. old male who presents to walk-in with complaints of Rhinorrhea, dry Cough and sneezing x 3 days. States symptoms have been unchanged since onset. Has been taking Robitussin without symptomatic relief. Currently denies any Fever, Shortness of breath, Nausea, Vomiting, Chest Pain, Abdominal Pain, Rash, Lethargy or Close contact with a lab confirmed COVID-19 patient within 14 days of symptom onset . Denies any hx of asthma. Reports daily tobacco use. Patient denies history of COVID-19. Patient has received a COVID-19 vaccination. ROS   Pertinent positives and negatives are stated within HPI, all other systems reviewed and are negative. Past Medical History:  has no past medical history on file. Past Surgical History:  has no past surgical history on file. Social History:  reports that he has been smoking cigars. He has been smoking about 0.00 packs per day for the past 53.00 years. He has never used smokeless tobacco. He reports previous alcohol use. He reports that he does not use drugs. Family History: family history is not on file. Allergies: Atorvastatin calcium and Ezetimibe-simvastatin    Physical Exam   Vital Signs:  /80   Pulse 82   Temp 98.4 °F (36.9 °C)   Resp 18   Ht 5' 9\" (1.753 m)   Wt 234 lb (106.1 kg)   SpO2 97%   BMI 34.56 kg/m²    Oxygen Saturation Interpretation: Normal.    Constitutional:  Alert, development consistent with age. NAD. Head:  NC/NT. Airway patent. Ears: TMs clear bilaterally. Canals without exudate or swelling bilaterally. Mouth: Posterior pharynx with mild erythema and clear postnasal drip. There is no tonsillar hypertrophy or exudate. Neck:  Normal ROM. Supple. There is no anterior cervical adenopathy noted. Lungs: CTAB without wheezes, rales, or rhonchi.    CV:  Regular rate and rhythm, normal heart sounds, without pathological murmurs, ectopy, gallops, or rubs. Skin:  Normal turgor. Warm, dry, without visible rash. Lymphatic: No lymphangitis or adenopathy noted. Neurological:  Oriented. Motor functions intact. Lab / Imaging Results   (All laboratory and radiology results have been personally reviewed by myself)  Labs:  Results for orders placed or performed in visit on 01/20/22   POCT COVID-19 Rapid, NAAT   Result Value Ref Range    SARS-COV-2, RdRp gene Negative Negative    Lot Number [id number]     QC Pass/Fail PASS        Imaging: All Radiology results interpreted by Radiologist unless otherwise noted. No results found. Medical Decision Making   Pt non-toxic, in no apparent distress and stable at time of discharge. Assessment/Plan   Emilee Mcdonough was seen today for cough and nasal congestion. Diagnoses and all orders for this visit:    Viral URI  -     guaiFENesin (MUCINEX) 600 MG extended release tablet; Take 1 tablet by mouth 2 times daily for 15 days    Encounter for laboratory testing for COVID-19 virus  -     POCT COVID-19 Rapid, NAAT    Rapid COVID-19 negative in office, pt advised of results. Increase fluids and rest. Symptomatic relief discussed including Tylenol prn pain/fever. Schedule f/u with PCP in 7-10 days if symptoms persist. ED sooner if symptoms worsen or change. ED immediately with high or refractory fever, progressive SOB, dyspnea, CP, calf pain/swelling, shaking chills, vomiting, abdominal pain, lethargy, flank pain, or decreased urinary output. Pt verbalizes understanding and is in agreement with plan of care. All questions answered. Return if symptoms worsen or fail to improve. Electronically signed by ANA Aragon CNP   DD: 1/20/22    **This report was transcribed using voice recognition software. Every effort was made to ensure accuracy; however, inadvertent computerized transcription errors may be present.

## 2022-01-24 ENCOUNTER — TELEPHONE (OUTPATIENT)
Dept: FAMILY MEDICINE CLINIC | Age: 72
End: 2022-01-24

## 2022-01-24 RX ORDER — BENZONATATE 100 MG/1
100 CAPSULE ORAL 3 TIMES DAILY PRN
Qty: 30 CAPSULE | Refills: 0 | Status: SHIPPED | OUTPATIENT
Start: 2022-01-24 | End: 2022-01-31

## 2022-01-24 NOTE — TELEPHONE ENCOUNTER
Pt called to ask for a cough medicine. He states that the mucinex is making him cough and he complains of coughing up mucous. He feels that the mucinex is not helping him. Please advise.

## 2022-01-24 NOTE — TELEPHONE ENCOUNTER
Will send in [place] [phone] for couple of weeks.  If still needs something when out will probably need seen

## 2022-02-02 ENCOUNTER — OFFICE VISIT (OUTPATIENT)
Dept: FAMILY MEDICINE CLINIC | Age: 72
End: 2022-02-02
Payer: MEDICARE

## 2022-02-02 VITALS
RESPIRATION RATE: 18 BRPM | OXYGEN SATURATION: 97 % | WEIGHT: 233 LBS | BODY MASS INDEX: 34.51 KG/M2 | HEART RATE: 94 BPM | HEIGHT: 69 IN | TEMPERATURE: 98 F

## 2022-02-02 DIAGNOSIS — R35.0 BENIGN PROSTATIC HYPERPLASIA WITH URINARY FREQUENCY: ICD-10-CM

## 2022-02-02 DIAGNOSIS — N40.1 BENIGN PROSTATIC HYPERPLASIA WITH URINARY FREQUENCY: ICD-10-CM

## 2022-02-02 DIAGNOSIS — J01.90 ACUTE BACTERIAL SINUSITIS: Primary | ICD-10-CM

## 2022-02-02 DIAGNOSIS — M46.1 SACROILIITIS (HCC): ICD-10-CM

## 2022-02-02 DIAGNOSIS — B96.89 ACUTE BACTERIAL SINUSITIS: Primary | ICD-10-CM

## 2022-02-02 PROCEDURE — 96372 THER/PROPH/DIAG INJ SC/IM: CPT | Performed by: FAMILY MEDICINE

## 2022-02-02 PROCEDURE — G8417 CALC BMI ABV UP PARAM F/U: HCPCS | Performed by: FAMILY MEDICINE

## 2022-02-02 PROCEDURE — 4004F PT TOBACCO SCREEN RCVD TLK: CPT | Performed by: FAMILY MEDICINE

## 2022-02-02 PROCEDURE — G8484 FLU IMMUNIZE NO ADMIN: HCPCS | Performed by: FAMILY MEDICINE

## 2022-02-02 PROCEDURE — 99214 OFFICE O/P EST MOD 30 MIN: CPT | Performed by: FAMILY MEDICINE

## 2022-02-02 PROCEDURE — 3017F COLORECTAL CA SCREEN DOC REV: CPT | Performed by: FAMILY MEDICINE

## 2022-02-02 PROCEDURE — G8427 DOCREV CUR MEDS BY ELIG CLIN: HCPCS | Performed by: FAMILY MEDICINE

## 2022-02-02 PROCEDURE — 4040F PNEUMOC VAC/ADMIN/RCVD: CPT | Performed by: FAMILY MEDICINE

## 2022-02-02 PROCEDURE — 1123F ACP DISCUSS/DSCN MKR DOCD: CPT | Performed by: FAMILY MEDICINE

## 2022-02-02 RX ORDER — AMOXICILLIN AND CLAVULANATE POTASSIUM 875; 125 MG/1; MG/1
1 TABLET, FILM COATED ORAL 2 TIMES DAILY
Qty: 20 TABLET | Refills: 0 | Status: SHIPPED | OUTPATIENT
Start: 2022-02-02 | End: 2022-02-12

## 2022-02-02 RX ORDER — TAMSULOSIN HYDROCHLORIDE 0.4 MG/1
0.8 CAPSULE ORAL DAILY
Qty: 180 CAPSULE | Refills: 1 | Status: SHIPPED | OUTPATIENT
Start: 2022-02-02 | End: 2022-05-24 | Stop reason: SDUPTHER

## 2022-02-02 RX ORDER — METHYLPREDNISOLONE ACETATE 80 MG/ML
40 INJECTION, SUSPENSION INTRA-ARTICULAR; INTRALESIONAL; INTRAMUSCULAR; SOFT TISSUE ONCE
Status: COMPLETED | OUTPATIENT
Start: 2022-02-02 | End: 2022-02-02

## 2022-02-02 RX ORDER — CELECOXIB 200 MG/1
200 CAPSULE ORAL DAILY
Qty: 30 CAPSULE | Refills: 5 | Status: SHIPPED | OUTPATIENT
Start: 2022-02-02 | End: 2022-05-24 | Stop reason: SDUPTHER

## 2022-02-02 RX ADMIN — METHYLPREDNISOLONE ACETATE 40 MG: 80 INJECTION, SUSPENSION INTRA-ARTICULAR; INTRALESIONAL; INTRAMUSCULAR; SOFT TISSUE at 15:48

## 2022-02-02 NOTE — PROGRESS NOTES
OFFICE NOTE    2/2/22  Name: Faina Barba  KSH:[birth year]   Sex:male   Age:71 y.o. SUBJECTIVE  Chief Complaint   Patient presents with    Cough     x three wks patient has been dealing with a cough with mucus (green/yellow) not getting any better       HPI chronic symptoms of mood dysphoria and tiredness persist    Review of Systems   Constitutional: Positive for fatigue. Negative for appetite change, fever and unexpected weight change. HENT: Positive for congestion, postnasal drip and sinus pressure. Negative for ear pain, hearing loss, sinus pain, sore throat and trouble swallowing. Eyes: Negative for photophobia, redness and visual disturbance. Respiratory: Positive for cough and shortness of breath. Negative for wheezing. Cardiovascular: Negative for chest pain, palpitations and leg swelling. Gastrointestinal: Negative for abdominal pain, blood in stool, constipation, diarrhea and vomiting. Endocrine: Negative for cold intolerance, polydipsia and polyuria. Genitourinary: Positive for urgency. Negative for difficulty urinating, genital sores and hematuria. Musculoskeletal: Positive for arthralgias and back pain. Negative for joint swelling. Skin: Negative for pallor and rash. Allergic/Immunologic: Negative for food allergies. Neurological: Negative for dizziness, tremors, seizures, syncope, numbness and headaches. Hematological: Negative for adenopathy. Does not bruise/bleed easily. Psychiatric/Behavioral: Negative for agitation, dysphoric mood, hallucinations and sleep disturbance. All other systems reviewed and are negative.            Current Outpatient Medications:     tamsulosin (FLOMAX) 0.4 MG capsule, Take 2 capsules by mouth daily With food, Disp: 180 capsule, Rfl: 1    celecoxib (CELEBREX) 200 MG capsule, Take 1 capsule by mouth daily, Disp: 30 capsule, Rfl: 5    amoxicillin-clavulanate (AUGMENTIN) 875-125 MG per tablet, Take 1 tablet by mouth 2 times daily for 10 days, Disp: 20 tablet, Rfl: 0    guaiFENesin (MUCINEX) 600 MG extended release tablet, Take 1 tablet by mouth 2 times daily for 15 days, Disp: 30 tablet, Rfl: 0    ibuprofen (ADVIL;MOTRIN) 400 MG tablet, Take 400 mg by mouth every 6 hours as needed for Pain, Disp: , Rfl:     Red Yeast Rice 600 MG CAPS, Take by mouth, Disp: , Rfl:     Multiple Vitamins-Minerals (CENTRUM SILVER 50+MEN) TABS, Take by mouth, Disp: , Rfl:     DULoxetine (CYMBALTA) 30 MG extended release capsule, Take 1 capsule by mouth daily (Patient not taking: Reported on 1/20/2022), Disp: 30 capsule, Rfl: 5  Allergies   Allergen Reactions    Atorvastatin Calcium Other (See Comments)     Leg cramps      Ezetimibe-Simvastatin Other (See Comments)     Leg pain/cramping       No past medical history on file. No past surgical history on file. No family history on file. Social History     Tobacco History     Smoking Status  Current Some Day Smoker Smoking Frequency  0 packs/day for 53 years (0 pk yrs) Smoking Tobacco Type  Cigars    Smokeless Tobacco Use  Never Used          Alcohol History     Alcohol Use Status  Not Currently          Drug Use     Drug Use Status  Never          Sexual Activity     Sexually Active  Not Currently Partners  Female                OBJECTIVE  Vitals:    02/02/22 1441   Pulse: 94   Resp: 18   Temp: 98 °F (36.7 °C)   SpO2: 97%   Weight: 233 lb (105.7 kg)   Height: 5' 9\" (1.753 m)        Body mass index is 34.41 kg/m². No orders of the defined types were placed in this encounter. EXAM   Physical Exam  Vitals and nursing note reviewed. Constitutional:       Appearance: Normal appearance. He is well-developed. He is obese. HENT:      Right Ear: Tympanic membrane, ear canal and external ear normal.      Left Ear: Tympanic membrane, ear canal and external ear normal.      Nose: Congestion and rhinorrhea present. Mouth/Throat:      Pharynx: Oropharynx is clear. Posterior oropharyngeal erythema present. Eyes:      General: No scleral icterus. Conjunctiva/sclera: Conjunctivae normal.      Pupils: Pupils are equal, round, and reactive to light. Neck:      Thyroid: No thyroid mass or thyromegaly. Vascular: No carotid bruit or JVD. Trachea: Trachea normal.   Cardiovascular:      Rate and Rhythm: Normal rate and regular rhythm. Pulses: Normal pulses. Heart sounds: Normal heart sounds. No murmur heard. No gallop. Pulmonary:      Effort: Pulmonary effort is normal.      Breath sounds: Normal breath sounds. No wheezing, rhonchi or rales. Abdominal:      General: Bowel sounds are normal. There is no distension. Palpations: Abdomen is soft. There is no mass. Tenderness: There is no abdominal tenderness. There is no guarding or rebound. Genitourinary:     Prostate: Normal.      Rectum: Normal. Guaiac result negative. Musculoskeletal:         General: No swelling or tenderness. Normal range of motion. Cervical back: Neck supple. No tenderness. Right lower leg: No edema. Left lower leg: No edema. Lymphadenopathy:      Cervical: No cervical adenopathy. Skin:     General: Skin is warm and dry. Capillary Refill: Capillary refill takes less than 2 seconds. Coloration: Skin is not jaundiced. Findings: No bruising or rash. Neurological:      General: No focal deficit present. Mental Status: He is alert. He is disoriented. Sensory: No sensory deficit. Motor: No weakness or abnormal muscle tone. Gait: Gait normal.   Psychiatric:         Mood and Affect: Mood normal.         Behavior: Behavior normal.           Jonny Pinzon was seen today for cough. Diagnoses and all orders for this visit:    Acute bacterial sinusitis  See below  Benign prostatic hyperplasia with urinary frequency  -     tamsulosin (FLOMAX) 0.4 MG capsule; Take 2 capsules by mouth daily With food    Sacroiliitis (HCC)  Chronic Low Back Pain.  Use thermocare or salon pas pads. Limit bending/lifting. Other orders  -     celecoxib (CELEBREX) 200 MG capsule; Take 1 capsule by mouth daily  -     amoxicillin-clavulanate (AUGMENTIN) 875-125 MG per tablet; Take 1 tablet by mouth 2 times daily for 10 days  -     methylPREDNISolone acetate (DEPO-MEDROL) injection 40 mg          No follow-ups on file.     Electronically signed by Jayden Terrell MD on 2/2/22 at 3:17 PM EST

## 2022-02-03 ASSESSMENT — ENCOUNTER SYMPTOMS
ABDOMINAL PAIN: 0
CONSTIPATION: 0
BLOOD IN STOOL: 0
SINUS PRESSURE: 1
BACK PAIN: 1
DIARRHEA: 0
TROUBLE SWALLOWING: 0
EYE REDNESS: 0
WHEEZING: 0
SHORTNESS OF BREATH: 1
SINUS PAIN: 0
VOMITING: 0
PHOTOPHOBIA: 0
SORE THROAT: 0
COUGH: 1

## 2022-04-12 ENCOUNTER — OFFICE VISIT (OUTPATIENT)
Dept: FAMILY MEDICINE CLINIC | Age: 72
End: 2022-04-12
Payer: MEDICARE

## 2022-04-12 VITALS
TEMPERATURE: 96.9 F | SYSTOLIC BLOOD PRESSURE: 124 MMHG | WEIGHT: 233.6 LBS | OXYGEN SATURATION: 98 % | HEART RATE: 78 BPM | HEIGHT: 69 IN | DIASTOLIC BLOOD PRESSURE: 78 MMHG | BODY MASS INDEX: 34.6 KG/M2 | RESPIRATION RATE: 18 BRPM

## 2022-04-12 DIAGNOSIS — M15.9 PRIMARY OSTEOARTHRITIS INVOLVING MULTIPLE JOINTS: ICD-10-CM

## 2022-04-12 DIAGNOSIS — F32.9 REACTIVE DEPRESSION: ICD-10-CM

## 2022-04-12 DIAGNOSIS — E78.49 OTHER HYPERLIPIDEMIA: ICD-10-CM

## 2022-04-12 DIAGNOSIS — I10 PRIMARY HYPERTENSION: Primary | ICD-10-CM

## 2022-04-12 DIAGNOSIS — N40.1 BENIGN PROSTATIC HYPERPLASIA WITH URINARY FREQUENCY: ICD-10-CM

## 2022-04-12 DIAGNOSIS — R35.0 BENIGN PROSTATIC HYPERPLASIA WITH URINARY FREQUENCY: ICD-10-CM

## 2022-04-12 PROBLEM — M79.609 PAIN IN LIMB: Status: RESOLVED | Noted: 2018-07-09 | Resolved: 2022-04-12

## 2022-04-12 PROBLEM — M25.519 SHOULDER ARTHRALGIA: Status: RESOLVED | Noted: 2019-09-24 | Resolved: 2022-04-12

## 2022-04-12 PROBLEM — M46.1 SACROILIITIS (HCC): Status: RESOLVED | Noted: 2021-05-11 | Resolved: 2022-04-12

## 2022-04-12 PROBLEM — M19.279 SECONDARY LOCALIZED OSTEOARTHROSIS OF ANKLE AND FOOT: Status: RESOLVED | Noted: 2018-07-09 | Resolved: 2022-04-12

## 2022-04-12 PROBLEM — M25.579 PAIN IN JOINT INVOLVING ANKLE AND FOOT: Status: RESOLVED | Noted: 2017-05-04 | Resolved: 2022-04-12

## 2022-04-12 PROCEDURE — 3017F COLORECTAL CA SCREEN DOC REV: CPT | Performed by: FAMILY MEDICINE

## 2022-04-12 PROCEDURE — 1123F ACP DISCUSS/DSCN MKR DOCD: CPT | Performed by: FAMILY MEDICINE

## 2022-04-12 PROCEDURE — G8427 DOCREV CUR MEDS BY ELIG CLIN: HCPCS | Performed by: FAMILY MEDICINE

## 2022-04-12 PROCEDURE — 4040F PNEUMOC VAC/ADMIN/RCVD: CPT | Performed by: FAMILY MEDICINE

## 2022-04-12 PROCEDURE — 4004F PT TOBACCO SCREEN RCVD TLK: CPT | Performed by: FAMILY MEDICINE

## 2022-04-12 PROCEDURE — 99214 OFFICE O/P EST MOD 30 MIN: CPT | Performed by: FAMILY MEDICINE

## 2022-04-12 PROCEDURE — G8417 CALC BMI ABV UP PARAM F/U: HCPCS | Performed by: FAMILY MEDICINE

## 2022-04-12 ASSESSMENT — ENCOUNTER SYMPTOMS
VOMITING: 0
CONSTIPATION: 0
COUGH: 0
WHEEZING: 0
SINUS PAIN: 0
ABDOMINAL PAIN: 0
SORE THROAT: 0
TROUBLE SWALLOWING: 0
BACK PAIN: 0
SHORTNESS OF BREATH: 0
BLOOD IN STOOL: 0
DIARRHEA: 0
EYE REDNESS: 0
PHOTOPHOBIA: 0

## 2022-04-12 ASSESSMENT — PATIENT HEALTH QUESTIONNAIRE - PHQ9
SUM OF ALL RESPONSES TO PHQ QUESTIONS 1-9: 1
7. TROUBLE CONCENTRATING ON THINGS, SUCH AS READING THE NEWSPAPER OR WATCHING TELEVISION: 0
8. MOVING OR SPEAKING SO SLOWLY THAT OTHER PEOPLE COULD HAVE NOTICED. OR THE OPPOSITE, BEING SO FIGETY OR RESTLESS THAT YOU HAVE BEEN MOVING AROUND A LOT MORE THAN USUAL: 0
SUM OF ALL RESPONSES TO PHQ QUESTIONS 1-9: 1
4. FEELING TIRED OR HAVING LITTLE ENERGY: 1
10. IF YOU CHECKED OFF ANY PROBLEMS, HOW DIFFICULT HAVE THESE PROBLEMS MADE IT FOR YOU TO DO YOUR WORK, TAKE CARE OF THINGS AT HOME, OR GET ALONG WITH OTHER PEOPLE: 0
3. TROUBLE FALLING OR STAYING ASLEEP: 0
SUM OF ALL RESPONSES TO PHQ QUESTIONS 1-9: 1
6. FEELING BAD ABOUT YOURSELF - OR THAT YOU ARE A FAILURE OR HAVE LET YOURSELF OR YOUR FAMILY DOWN: 0
5. POOR APPETITE OR OVEREATING: 0
SUM OF ALL RESPONSES TO PHQ QUESTIONS 1-9: 1
2. FEELING DOWN, DEPRESSED OR HOPELESS: 0
9. THOUGHTS THAT YOU WOULD BE BETTER OFF DEAD, OR OF HURTING YOURSELF: 0
SUM OF ALL RESPONSES TO PHQ9 QUESTIONS 1 & 2: 0
1. LITTLE INTEREST OR PLEASURE IN DOING THINGS: 0
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
## 2022-05-24 ENCOUNTER — OFFICE VISIT (OUTPATIENT)
Dept: FAMILY MEDICINE CLINIC | Age: 72
End: 2022-05-24
Payer: MEDICARE

## 2022-05-24 VITALS
TEMPERATURE: 97.5 F | HEIGHT: 69 IN | OXYGEN SATURATION: 96 % | HEART RATE: 71 BPM | SYSTOLIC BLOOD PRESSURE: 118 MMHG | RESPIRATION RATE: 17 BRPM | WEIGHT: 234 LBS | DIASTOLIC BLOOD PRESSURE: 80 MMHG | BODY MASS INDEX: 34.66 KG/M2

## 2022-05-24 DIAGNOSIS — F32.9 REACTIVE DEPRESSION: ICD-10-CM

## 2022-05-24 DIAGNOSIS — N40.1 BENIGN PROSTATIC HYPERPLASIA WITH URINARY FREQUENCY: ICD-10-CM

## 2022-05-24 DIAGNOSIS — M19.021 PRIMARY OSTEOARTHRITIS OF BOTH ELBOWS: ICD-10-CM

## 2022-05-24 DIAGNOSIS — R35.0 FREQUENT URINATION: Primary | ICD-10-CM

## 2022-05-24 DIAGNOSIS — R35.0 BENIGN PROSTATIC HYPERPLASIA WITH URINARY FREQUENCY: ICD-10-CM

## 2022-05-24 DIAGNOSIS — M19.072 PRIMARY OSTEOARTHRITIS OF LEFT ANKLE: ICD-10-CM

## 2022-05-24 DIAGNOSIS — M19.022 PRIMARY OSTEOARTHRITIS OF BOTH ELBOWS: ICD-10-CM

## 2022-05-24 LAB
BILIRUBIN, POC: NEGATIVE
BLOOD URINE, POC: NORMAL
CLARITY, POC: CLEAR
COLOR, POC: YELLOW
GLUCOSE URINE, POC: NEGATIVE
KETONES, POC: NEGATIVE
LEUKOCYTE EST, POC: NEGATIVE
NITRITE, POC: NEGATIVE
PH, POC: 5.5
PROTEIN, POC: NEGATIVE
SPECIFIC GRAVITY, POC: 1.01
UROBILINOGEN, POC: 0.2

## 2022-05-24 PROCEDURE — 3017F COLORECTAL CA SCREEN DOC REV: CPT | Performed by: FAMILY MEDICINE

## 2022-05-24 PROCEDURE — G8417 CALC BMI ABV UP PARAM F/U: HCPCS | Performed by: FAMILY MEDICINE

## 2022-05-24 PROCEDURE — 99214 OFFICE O/P EST MOD 30 MIN: CPT | Performed by: FAMILY MEDICINE

## 2022-05-24 PROCEDURE — 81002 URINALYSIS NONAUTO W/O SCOPE: CPT | Performed by: FAMILY MEDICINE

## 2022-05-24 PROCEDURE — G8427 DOCREV CUR MEDS BY ELIG CLIN: HCPCS | Performed by: FAMILY MEDICINE

## 2022-05-24 PROCEDURE — 1123F ACP DISCUSS/DSCN MKR DOCD: CPT | Performed by: FAMILY MEDICINE

## 2022-05-24 PROCEDURE — 4004F PT TOBACCO SCREEN RCVD TLK: CPT | Performed by: FAMILY MEDICINE

## 2022-05-24 RX ORDER — CELECOXIB 200 MG/1
200 CAPSULE ORAL DAILY
Qty: 30 CAPSULE | Refills: 5 | Status: SHIPPED
Start: 2022-05-24 | End: 2022-10-13 | Stop reason: SDUPTHER

## 2022-05-24 RX ORDER — TAMSULOSIN HYDROCHLORIDE 0.4 MG/1
0.8 CAPSULE ORAL DAILY
Qty: 180 CAPSULE | Refills: 1 | Status: SHIPPED
Start: 2022-05-24 | End: 2022-10-13 | Stop reason: SDUPTHER

## 2022-05-24 SDOH — ECONOMIC STABILITY: FOOD INSECURITY: WITHIN THE PAST 12 MONTHS, YOU WORRIED THAT YOUR FOOD WOULD RUN OUT BEFORE YOU GOT MONEY TO BUY MORE.: NEVER TRUE

## 2022-05-24 SDOH — ECONOMIC STABILITY: FOOD INSECURITY: WITHIN THE PAST 12 MONTHS, THE FOOD YOU BOUGHT JUST DIDN'T LAST AND YOU DIDN'T HAVE MONEY TO GET MORE.: NEVER TRUE

## 2022-05-24 ASSESSMENT — ENCOUNTER SYMPTOMS
WHEEZING: 0
VOMITING: 0
SORE THROAT: 0
SINUS PAIN: 0
SHORTNESS OF BREATH: 0
COUGH: 0
ABDOMINAL PAIN: 0
EYE REDNESS: 0
BACK PAIN: 1
CONSTIPATION: 0
DIARRHEA: 0
TROUBLE SWALLOWING: 0
PHOTOPHOBIA: 0
BLOOD IN STOOL: 0

## 2022-05-24 ASSESSMENT — SOCIAL DETERMINANTS OF HEALTH (SDOH): HOW HARD IS IT FOR YOU TO PAY FOR THE VERY BASICS LIKE FOOD, HOUSING, MEDICAL CARE, AND HEATING?: NOT HARD AT ALL
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
## 2022-06-13 ENCOUNTER — OFFICE VISIT (OUTPATIENT)
Dept: FAMILY MEDICINE CLINIC | Age: 72
End: 2022-06-13
Payer: MEDICARE

## 2022-06-13 VITALS
SYSTOLIC BLOOD PRESSURE: 122 MMHG | OXYGEN SATURATION: 97 % | WEIGHT: 231.2 LBS | HEIGHT: 69 IN | DIASTOLIC BLOOD PRESSURE: 72 MMHG | RESPIRATION RATE: 17 BRPM | HEART RATE: 95 BPM | BODY MASS INDEX: 34.24 KG/M2 | TEMPERATURE: 97.1 F

## 2022-06-13 DIAGNOSIS — G56.23 ULNAR NEUROPATHY OF BOTH UPPER EXTREMITIES: Primary | ICD-10-CM

## 2022-06-13 DIAGNOSIS — M46.1 SACROILIITIS (HCC): ICD-10-CM

## 2022-06-13 PROCEDURE — G8417 CALC BMI ABV UP PARAM F/U: HCPCS | Performed by: FAMILY MEDICINE

## 2022-06-13 PROCEDURE — 20552 NJX 1/MLT TRIGGER POINT 1/2: CPT | Performed by: FAMILY MEDICINE

## 2022-06-13 PROCEDURE — 3017F COLORECTAL CA SCREEN DOC REV: CPT | Performed by: FAMILY MEDICINE

## 2022-06-13 PROCEDURE — G8427 DOCREV CUR MEDS BY ELIG CLIN: HCPCS | Performed by: FAMILY MEDICINE

## 2022-06-13 PROCEDURE — 99213 OFFICE O/P EST LOW 20 MIN: CPT | Performed by: FAMILY MEDICINE

## 2022-06-13 PROCEDURE — 1123F ACP DISCUSS/DSCN MKR DOCD: CPT | Performed by: FAMILY MEDICINE

## 2022-06-13 PROCEDURE — 4004F PT TOBACCO SCREEN RCVD TLK: CPT | Performed by: FAMILY MEDICINE

## 2022-06-13 RX ORDER — LIDOCAINE HYDROCHLORIDE 10 MG/ML
20 INJECTION, SOLUTION INFILTRATION; PERINEURAL ONCE
Status: COMPLETED | OUTPATIENT
Start: 2022-06-13 | End: 2022-06-13

## 2022-06-13 RX ORDER — METHYLPREDNISOLONE ACETATE 80 MG/ML
40 INJECTION, SUSPENSION INTRA-ARTICULAR; INTRALESIONAL; INTRAMUSCULAR; SOFT TISSUE ONCE
Status: COMPLETED | OUTPATIENT
Start: 2022-06-13 | End: 2022-06-13

## 2022-06-13 RX ORDER — TIZANIDINE 4 MG/1
4 TABLET ORAL NIGHTLY PRN
Qty: 10 TABLET | Refills: 0 | Status: SHIPPED | OUTPATIENT
Start: 2022-06-13

## 2022-06-13 RX ADMIN — LIDOCAINE HYDROCHLORIDE 20 ML: 10 INJECTION, SOLUTION INFILTRATION; PERINEURAL at 15:29

## 2022-06-13 RX ADMIN — METHYLPREDNISOLONE ACETATE 40 MG: 80 INJECTION, SUSPENSION INTRA-ARTICULAR; INTRALESIONAL; INTRAMUSCULAR; SOFT TISSUE at 15:30

## 2022-06-13 ASSESSMENT — ENCOUNTER SYMPTOMS
DIARRHEA: 0
SHORTNESS OF BREATH: 0
SINUS PAIN: 0
TROUBLE SWALLOWING: 0
BLOOD IN STOOL: 0
WHEEZING: 0
CONSTIPATION: 0
VOMITING: 0
SORE THROAT: 0
COUGH: 0
ABDOMINAL PAIN: 0
EYE REDNESS: 0
PHOTOPHOBIA: 0
BACK PAIN: 1

## 2022-06-13 NOTE — PROGRESS NOTES
capsule, Take 2 capsules by mouth daily With food, Disp: 180 capsule, Rfl: 1    Red Yeast Rice 600 MG CAPS, Take by mouth, Disp: , Rfl:     Multiple Vitamins-Minerals (CENTRUM SILVER 50+MEN) TABS, Take by mouth, Disp: , Rfl:     DULoxetine (CYMBALTA) 30 MG extended release capsule, Take 1 capsule by mouth daily (Patient not taking: Reported on 1/20/2022), Disp: 30 capsule, Rfl: 5    ibuprofen (ADVIL;MOTRIN) 400 MG tablet, Take 400 mg by mouth every 6 hours as needed for Pain (Patient not taking: Reported on 4/12/2022), Disp: , Rfl:   Allergies   Allergen Reactions    Atorvastatin Calcium Other (See Comments)     Leg cramps      Ezetimibe-Simvastatin Other (See Comments)     Leg pain/cramping       No past medical history on file. No past surgical history on file. No family history on file. Social History     Tobacco History     Smoking Status  Current Some Day Smoker Smoking Frequency  0 packs/day for 53 years (0 pk yrs) Smoking Tobacco Type  Cigars    Smokeless Tobacco Use  Never Used          Alcohol History     Alcohol Use Status  Not Currently          Drug Use     Drug Use Status  Never          Sexual Activity     Sexually Active  Not Currently Partners  Female                OBJECTIVE  Vitals:    06/13/22 1429   BP: 122/72   Pulse: 95   Resp: 17   Temp: 97.1 °F (36.2 °C)   TempSrc: Temporal   SpO2: 97%   Weight: 231 lb 3.2 oz (104.9 kg)   Height: 5' 9\" (1.753 m)        Body mass index is 34.14 kg/m². Orders Placed This Encounter   Procedures   Trinh Aguilar MD, Neurology, [place]     Referral Priority:   Routine     Referral Type:   Eval and Treat     Referral Reason:   Specialty Services Required     Referred to Provider:   Gildardo Sifuentes MD     Requested Specialty:   Neurology     Number of Visits Requested:   1    [place] ARTHROCENTESIS ASPIR&/INJ INTERM JT/BURS W/O US        EXAM   Physical Exam  Vitals and nursing note reviewed.    Constitutional:       Appearance: Normal appearance. He is obese. Cardiovascular:      Rate and Rhythm: Normal rate and regular rhythm. Heart sounds: No murmur heard. Pulmonary:      Effort: Pulmonary effort is normal.      Breath sounds: Rhonchi present. No wheezing. Musculoskeletal:      Comments: Pain SI joint on left side. Negative SLRs, hip OA moderate, no trochanteric bursitis   Skin:     Coloration: Skin is not jaundiced. Findings: No bruising or rash. Neurological:      Mental Status: He is alert. Comments: Ulnar neuropathy R>L. No hypothenar atrophy. Right elbow lacks 5 degrees full extension           Hermelinda Pickering was seen today for lower back pain and numbness. Diagnoses and all orders for this visit:    Ulnar neuropathy of both upper extremities  -     Harjit Maldonado MD, Neurology, [place]    Sacroiliitis [facility])  -     [place] ARTHROCENTESIS ASPIR&/INJ INTERM JT/BURS W/O US    Other orders  -     tiZANidine (ZANAFLEX) 4 MG tablet; Take 1 tablet by mouth nightly as needed (back pain)  -     lidocaine 1 % injection 20 mL  -     methylPREDNISolone acetate (DEPO-MEDROL) injection 40 mg    Pt sitting in chair leaning over exam table. Left SI joint prepped with betadine and alcohol. Injected at left SI joint at point of maximal tenderness. bandaid applied. Artur exercises       No follow-ups on file.     Electronically signed by Lindy Campbell MD on 6/13/22 at 2:52 PM EDT

## 2022-06-20 ENCOUNTER — OFFICE VISIT (OUTPATIENT)
Dept: PODIATRY | Age: 72
End: 2022-06-20
Payer: MEDICARE

## 2022-06-20 VITALS — BODY MASS INDEX: 34.21 KG/M2 | HEIGHT: 69 IN | WEIGHT: 231 LBS

## 2022-06-20 DIAGNOSIS — R26.2 DIFFICULTY WALKING: ICD-10-CM

## 2022-06-20 DIAGNOSIS — I73.9 PVD (PERIPHERAL VASCULAR DISEASE) (HCC): ICD-10-CM

## 2022-06-20 DIAGNOSIS — M79.674 PAIN OF TOE OF RIGHT FOOT: ICD-10-CM

## 2022-06-20 DIAGNOSIS — M79.675 PAIN OF TOE OF LEFT FOOT: ICD-10-CM

## 2022-06-20 DIAGNOSIS — I87.2 VENOUS INSUFFICIENCY (CHRONIC) (PERIPHERAL): ICD-10-CM

## 2022-06-20 DIAGNOSIS — B35.1 ONYCHOMYCOSIS: Primary | ICD-10-CM

## 2022-06-20 PROCEDURE — 11721 DEBRIDE NAIL 6 OR MORE: CPT | Performed by: PODIATRIST

## 2022-06-20 PROCEDURE — 99999 PR OFFICE/OUTPT VISIT,PROCEDURE ONLY: CPT | Performed by: PODIATRIST

## 2022-06-20 NOTE — PROGRESS NOTES
22     Bonny     : 1950  Sex: male  Age: 66 y.o. Subjective: The patient is seen today for evaluation regarding foot evaluation and mycotic nail care. No other complaints noted. Chief Complaint   Patient presents with    Nail Problem    Foot Pain     left ankle pain       Current Medications:    Current Outpatient Medications:     tiZANidine (ZANAFLEX) 4 MG tablet, Take 1 tablet by mouth nightly as needed (back pain), Disp: 10 tablet, Rfl: 0    celecoxib (CELEBREX) 200 MG capsule, Take 1 capsule by mouth daily, Disp: 30 capsule, Rfl: 5    tamsulosin (FLOMAX) 0.4 mg capsule, Take 2 capsules by mouth daily With food, Disp: 180 capsule, Rfl: 1    DULoxetine (CYMBALTA) 30 MG extended release capsule, Take 1 capsule by mouth daily, Disp: 30 capsule, Rfl: 5    ibuprofen (ADVIL;MOTRIN) 400 MG tablet, Take 400 mg by mouth every 6 hours as needed for Pain , Disp: , Rfl:     Red Yeast Rice 600 MG CAPS, Take by mouth, Disp: , Rfl:     Multiple Vitamins-Minerals (CENTRUM SILVER 50+MEN) TABS, Take by mouth, Disp: , Rfl:     Allergies: Allergies   Allergen Reactions    Atorvastatin Calcium Other (See Comments)     Leg cramps      Ezetimibe-Simvastatin Other (See Comments)     Leg pain/cramping       No past surgical history on file. No past medical history on file. Vitals:    22 1142   Weight: 231 lb (104.8 kg)   Height: 5' 9\" (1.753 m)       Exam:  Pedal pulses diminished to palpation bilateral foot. Capillary refill time delayed digital regions bilateral foot. At this time the nail/s 1, 2, 3, 4, 5 right foot and nail/s 1, 2, 3, 4, 5 left foot are noted to be thickened, dystrophic and discolored with subungual debris present. Tenderness noted to palpation. Minimal hair growth is noted to both lower extremities. Edema noted with both varicosities and stasis skin changes present bilaterally. Coolness is noted to the digital regions to palpation.   Capillary fill time delayed digital areas bilateral foot. No heel fissuring or macerations of the web spaces. No plantar calluses and/or ulcerative areas are noted. Patient is having difficulty with gait/walking. Plan Per Assessment  Garima Alvarenga was seen today for nail problem and foot pain. Diagnoses and all orders for this visit:    Onychomycosis    Pain of toe of right foot    Pain of toe of left foot    PVD (peripheral vascular disease) (HCC)    Venous insufficiency (chronic) (peripheral)    Difficulty walking        1. Evaluation and Management  2. Manual and electrical debridement of the mycotic nails was performed for thickness and length to prevent injection and/or ulceration. 3. Discussed additional venous insufficiency lower extremity care techniques with patient today. 4. It was discussed in detail with the patient proper caring for the vascular compromised foot. The fact that they have compromised blood flow put the patient at risk for infection/gangrene/amputation. The patient should not walk barefoot. Shoe gear should fit properly and socks should be worn with shoes. If any skin lesions are noted, they are instructed to contact the office immediately. 5. We will see the patient back at a later date for continued podiatric management and care. Patient was advised to call the office with any questions or concerns prior to their next appointment if needed. Seen By:    Nael Forte DPM    Electronically signed by Nael Forte DPM on 6/20/2022 at 11:59 AM      This note was created using voice recognition software. The note was reviewed however may contain grammatical errors.

## 2022-06-20 NOTE — PROGRESS NOTES
Patient is here for nail care and left ankle pain. Patient states pain for a few month. Patient uses CBD oil.  Charissa Stallworth MD  Electronically signed by Saira Lara LPN on 2/09/2417 at 18:83 AM

## 2022-08-29 ENCOUNTER — PROCEDURE VISIT (OUTPATIENT)
Dept: PODIATRY | Age: 72
End: 2022-08-29
Payer: MEDICARE

## 2022-08-29 VITALS — WEIGHT: 231 LBS | BODY MASS INDEX: 34.21 KG/M2 | HEIGHT: 69 IN

## 2022-08-29 DIAGNOSIS — B35.1 ONYCHOMYCOSIS: Primary | ICD-10-CM

## 2022-08-29 DIAGNOSIS — M79.674 PAIN OF TOE OF RIGHT FOOT: ICD-10-CM

## 2022-08-29 DIAGNOSIS — I73.9 PVD (PERIPHERAL VASCULAR DISEASE) (HCC): ICD-10-CM

## 2022-08-29 DIAGNOSIS — M79.675 PAIN OF TOE OF LEFT FOOT: ICD-10-CM

## 2022-08-29 DIAGNOSIS — R26.2 DIFFICULTY WALKING: ICD-10-CM

## 2022-08-29 DIAGNOSIS — I87.2 VENOUS INSUFFICIENCY (CHRONIC) (PERIPHERAL): ICD-10-CM

## 2022-08-29 PROCEDURE — 11721 DEBRIDE NAIL 6 OR MORE: CPT | Performed by: PODIATRIST

## 2022-08-29 NOTE — PROGRESS NOTES
22     Vannessa Rossi    : 1950  Sex: male  Age: 66 y.o. Subjective: The patient is seen today for evaluation regarding foot evaluation and mycotic nail care. No other complaints noted. Chief Complaint   Patient presents with    Nail Problem       Current Medications:    Current Outpatient Medications:     tiZANidine (ZANAFLEX) 4 MG tablet, Take 1 tablet by mouth nightly as needed (back pain), Disp: 10 tablet, Rfl: 0    celecoxib (CELEBREX) 200 MG capsule, Take 1 capsule by mouth daily, Disp: 30 capsule, Rfl: 5    tamsulosin (FLOMAX) 0.4 mg capsule, Take 2 capsules by mouth daily With food, Disp: 180 capsule, Rfl: 1    DULoxetine (CYMBALTA) 30 MG extended release capsule, Take 1 capsule by mouth daily, Disp: 30 capsule, Rfl: 5    ibuprofen (ADVIL;MOTRIN) 400 MG tablet, Take 400 mg by mouth every 6 hours as needed for Pain , Disp: , Rfl:     Red Yeast Rice 600 MG CAPS, Take by mouth, Disp: , Rfl:     Multiple Vitamins-Minerals (CENTRUM SILVER 50+MEN) TABS, Take by mouth, Disp: , Rfl:     Allergies: Allergies   Allergen Reactions    Atorvastatin Calcium Other (See Comments)     Leg cramps      Ezetimibe-Simvastatin Other (See Comments)     Leg pain/cramping       No past surgical history on file. No past medical history on file. Vitals:    22 1051   Weight: 231 lb (104.8 kg)   Height: 5' 9\" (1.753 m)       Exam:  Pedal pulses diminished to palpation bilateral foot. Capillary refill time delayed digital regions bilateral foot. At this time the nail/s 1, 2, 5 right foot and nail/s 1, 2, 5 left foot are noted to be thickened, dystrophic and discolored with subungual debris present. Tenderness noted to palpation. Minimal hair growth is noted to both lower extremities. Edema noted with both varicosities and stasis skin changes present bilaterally. Coolness is noted to the digital regions to palpation. Capillary fill time delayed digital areas bilateral foot.   No heel fissuring or macerations of the web spaces. No plantar calluses and/or ulcerative areas are noted. Patient is having difficulty with gait/walking. Plan Per Assessment  Taras Favre was seen today for nail problem. Diagnoses and all orders for this visit:    Onychomycosis    Pain of toe of right foot    Pain of toe of left foot    PVD (peripheral vascular disease) (HCC)    Venous insufficiency (chronic) (peripheral)    Difficulty walking        1. Evaluation and Management  2. Manual and electrical debridement of the mycotic nails was performed for thickness and length to prevent injection and/or ulceration. 3. Discussed additional venous insufficiency lower extremity care techniques with patient today. 4. It was discussed in detail with the patient proper caring for the vascular compromised foot. The fact that they have compromised blood flow put the patient at risk for infection/gangrene/amputation. The patient should not walk barefoot. Shoe gear should fit properly and socks should be worn with shoes. If any skin lesions are noted, they are instructed to contact the office immediately. 5. We will see the patient back at a later date for continued podiatric management and care. Patient was advised to call the office with any questions or concerns prior to their next appointment if needed. Seen By:    Ortiz Mary DPM    Electronically signed by Ortiz Mary DPM on 8/29/2022 at 11:37 AM      This note was created using voice recognition software. The note was reviewed however may contain grammatical errors.

## 2022-10-13 ENCOUNTER — OFFICE VISIT (OUTPATIENT)
Dept: FAMILY MEDICINE CLINIC | Age: 72
End: 2022-10-13
Payer: MEDICARE

## 2022-10-13 VITALS
OXYGEN SATURATION: 97 % | SYSTOLIC BLOOD PRESSURE: 138 MMHG | HEIGHT: 69 IN | HEART RATE: 68 BPM | DIASTOLIC BLOOD PRESSURE: 80 MMHG | RESPIRATION RATE: 17 BRPM | BODY MASS INDEX: 34.45 KG/M2 | TEMPERATURE: 97.6 F | WEIGHT: 232.6 LBS

## 2022-10-13 DIAGNOSIS — M19.022 PRIMARY OSTEOARTHRITIS OF BOTH ELBOWS: ICD-10-CM

## 2022-10-13 DIAGNOSIS — R35.0 FREQUENT URINATION: Primary | ICD-10-CM

## 2022-10-13 DIAGNOSIS — Z23 IMMUNIZATION DUE: ICD-10-CM

## 2022-10-13 DIAGNOSIS — Z12.5 SCREENING FOR PROSTATE CANCER: ICD-10-CM

## 2022-10-13 DIAGNOSIS — R35.0 BENIGN PROSTATIC HYPERPLASIA WITH URINARY FREQUENCY: ICD-10-CM

## 2022-10-13 DIAGNOSIS — R35.0 FREQUENT URINATION: ICD-10-CM

## 2022-10-13 DIAGNOSIS — M19.021 PRIMARY OSTEOARTHRITIS OF BOTH ELBOWS: ICD-10-CM

## 2022-10-13 DIAGNOSIS — G56.23 ULNAR NEUROPATHY OF BOTH UPPER EXTREMITIES: ICD-10-CM

## 2022-10-13 DIAGNOSIS — E78.49 OTHER HYPERLIPIDEMIA: ICD-10-CM

## 2022-10-13 DIAGNOSIS — N40.1 BENIGN PROSTATIC HYPERPLASIA WITH URINARY FREQUENCY: ICD-10-CM

## 2022-10-13 DIAGNOSIS — F32.0 CURRENT MILD EPISODE OF MAJOR DEPRESSIVE DISORDER, UNSPECIFIED WHETHER RECURRENT (HCC): ICD-10-CM

## 2022-10-13 DIAGNOSIS — M48.061 SPINAL STENOSIS OF LUMBAR REGION, UNSPECIFIED WHETHER NEUROGENIC CLAUDICATION PRESENT: ICD-10-CM

## 2022-10-13 LAB
ALBUMIN SERPL-MCNC: 4.4 G/DL (ref 3.5–5.2)
ALP BLD-CCNC: 77 U/L (ref 40–129)
ALT SERPL-CCNC: 16 U/L (ref 0–40)
ANION GAP SERPL CALCULATED.3IONS-SCNC: 11 MMOL/L (ref 7–16)
AST SERPL-CCNC: 23 U/L (ref 0–39)
BASOPHILS ABSOLUTE: 0.02 E9/L (ref 0–0.2)
BASOPHILS RELATIVE PERCENT: 0.3 % (ref 0–2)
BILIRUB SERPL-MCNC: 0.4 MG/DL (ref 0–1.2)
BILIRUBIN, POC: NEGATIVE
BLOOD URINE, POC: NORMAL
BUN BLDV-MCNC: 17 MG/DL (ref 6–23)
CALCIUM SERPL-MCNC: 9.6 MG/DL (ref 8.6–10.2)
CHLORIDE BLD-SCNC: 103 MMOL/L (ref 98–107)
CHOLESTEROL, TOTAL: 244 MG/DL (ref 0–199)
CLARITY, POC: CLEAR
CO2: 26 MMOL/L (ref 22–29)
COLOR, POC: NORMAL
CREAT SERPL-MCNC: 0.7 MG/DL (ref 0.7–1.2)
EOSINOPHILS ABSOLUTE: 0.06 E9/L (ref 0.05–0.5)
EOSINOPHILS RELATIVE PERCENT: 1 % (ref 0–6)
GFR AFRICAN AMERICAN: >60
GFR NON-AFRICAN AMERICAN: >60 ML/MIN/1.73
GLUCOSE BLD-MCNC: 116 MG/DL (ref 74–99)
GLUCOSE URINE, POC: NEGATIVE
HCT VFR BLD CALC: 45.1 % (ref 37–54)
HDLC SERPL-MCNC: 62 MG/DL
HEMOGLOBIN: 14.9 G/DL (ref 12.5–16.5)
IMMATURE GRANULOCYTES #: 0.01 E9/L
IMMATURE GRANULOCYTES %: 0.2 % (ref 0–5)
KETONES, POC: NEGATIVE
LDL CHOLESTEROL CALCULATED: 167 MG/DL (ref 0–99)
LEUKOCYTE EST, POC: NEGATIVE
LYMPHOCYTES ABSOLUTE: 1.44 E9/L (ref 1.5–4)
LYMPHOCYTES RELATIVE PERCENT: 25.2 % (ref 20–42)
MCH RBC QN AUTO: 30.8 PG (ref 26–35)
MCHC RBC AUTO-ENTMCNC: 33 % (ref 32–34.5)
MCV RBC AUTO: 93.4 FL (ref 80–99.9)
MONOCYTES ABSOLUTE: 0.3 E9/L (ref 0.1–0.95)
MONOCYTES RELATIVE PERCENT: 5.2 % (ref 2–12)
NEUTROPHILS ABSOLUTE: 3.89 E9/L (ref 1.8–7.3)
NEUTROPHILS RELATIVE PERCENT: 68.1 % (ref 43–80)
NITRITE, POC: NEGATIVE
PDW BLD-RTO: 12.5 FL (ref 11.5–15)
PH, POC: 5.5
PLATELET # BLD: 251 E9/L (ref 130–450)
PMV BLD AUTO: 9.7 FL (ref 7–12)
POTASSIUM SERPL-SCNC: 4.6 MMOL/L (ref 3.5–5)
PROSTATE SPECIFIC ANTIGEN: 3.91 NG/ML (ref 0–4)
PROTEIN, POC: NEGATIVE
RBC # BLD: 4.83 E12/L (ref 3.8–5.8)
SODIUM BLD-SCNC: 140 MMOL/L (ref 132–146)
SPECIFIC GRAVITY, POC: >=1.03
TOTAL PROTEIN: 7.2 G/DL (ref 6.4–8.3)
TRIGL SERPL-MCNC: 73 MG/DL (ref 0–149)
TSH SERPL DL<=0.05 MIU/L-ACNC: 1.27 UIU/ML (ref 0.27–4.2)
UROBILINOGEN, POC: 0.2
VLDLC SERPL CALC-MCNC: 15 MG/DL
WBC # BLD: 5.7 E9/L (ref 4.5–11.5)

## 2022-10-13 PROCEDURE — G0008 ADMIN INFLUENZA VIRUS VAC: HCPCS | Performed by: FAMILY MEDICINE

## 2022-10-13 PROCEDURE — 99214 OFFICE O/P EST MOD 30 MIN: CPT | Performed by: FAMILY MEDICINE

## 2022-10-13 PROCEDURE — G8417 CALC BMI ABV UP PARAM F/U: HCPCS | Performed by: FAMILY MEDICINE

## 2022-10-13 PROCEDURE — 90694 VACC AIIV4 NO PRSRV 0.5ML IM: CPT | Performed by: FAMILY MEDICINE

## 2022-10-13 PROCEDURE — 4004F PT TOBACCO SCREEN RCVD TLK: CPT | Performed by: FAMILY MEDICINE

## 2022-10-13 PROCEDURE — 1123F ACP DISCUSS/DSCN MKR DOCD: CPT | Performed by: FAMILY MEDICINE

## 2022-10-13 PROCEDURE — 3017F COLORECTAL CA SCREEN DOC REV: CPT | Performed by: FAMILY MEDICINE

## 2022-10-13 PROCEDURE — 81002 URINALYSIS NONAUTO W/O SCOPE: CPT | Performed by: FAMILY MEDICINE

## 2022-10-13 PROCEDURE — G8484 FLU IMMUNIZE NO ADMIN: HCPCS | Performed by: FAMILY MEDICINE

## 2022-10-13 PROCEDURE — G8427 DOCREV CUR MEDS BY ELIG CLIN: HCPCS | Performed by: FAMILY MEDICINE

## 2022-10-13 RX ORDER — CELECOXIB 200 MG/1
200 CAPSULE ORAL DAILY
Qty: 90 CAPSULE | Refills: 1 | Status: SHIPPED | OUTPATIENT
Start: 2022-10-13

## 2022-10-13 RX ORDER — DULOXETIN HYDROCHLORIDE 30 MG/1
CAPSULE, DELAYED RELEASE ORAL
Qty: 60 CAPSULE | Refills: 5 | Status: SHIPPED | OUTPATIENT
Start: 2022-10-13

## 2022-10-13 RX ORDER — TAMSULOSIN HYDROCHLORIDE 0.4 MG/1
0.8 CAPSULE ORAL DAILY
Qty: 180 CAPSULE | Refills: 1 | Status: SHIPPED | OUTPATIENT
Start: 2022-10-13

## 2022-10-13 ASSESSMENT — ENCOUNTER SYMPTOMS
TROUBLE SWALLOWING: 0
WHEEZING: 0
EYE REDNESS: 0
ABDOMINAL PAIN: 0
BLOOD IN STOOL: 0
SORE THROAT: 0
PHOTOPHOBIA: 0
CONSTIPATION: 0
SINUS PAIN: 0
SHORTNESS OF BREATH: 0
DIARRHEA: 0
BACK PAIN: 1
VOMITING: 0
COUGH: 0

## 2022-10-16 LAB — URINE CULTURE, ROUTINE: NORMAL

## 2022-10-17 ENCOUNTER — TELEPHONE (OUTPATIENT)
Dept: FAMILY MEDICINE CLINIC | Age: 72
End: 2022-10-17

## 2022-10-31 ENCOUNTER — PROCEDURE VISIT (OUTPATIENT)
Dept: PODIATRY | Age: 72
End: 2022-10-31
Payer: MEDICARE

## 2022-10-31 VITALS — WEIGHT: 228 LBS | BODY MASS INDEX: 33.77 KG/M2 | HEIGHT: 69 IN

## 2022-10-31 DIAGNOSIS — M79.674 PAIN OF TOE OF RIGHT FOOT: ICD-10-CM

## 2022-10-31 DIAGNOSIS — I87.2 VENOUS INSUFFICIENCY (CHRONIC) (PERIPHERAL): ICD-10-CM

## 2022-10-31 DIAGNOSIS — R26.2 DIFFICULTY WALKING: ICD-10-CM

## 2022-10-31 DIAGNOSIS — I73.9 PVD (PERIPHERAL VASCULAR DISEASE) (HCC): ICD-10-CM

## 2022-10-31 DIAGNOSIS — M79.675 PAIN OF TOE OF LEFT FOOT: ICD-10-CM

## 2022-10-31 DIAGNOSIS — B35.1 ONYCHOMYCOSIS: Primary | ICD-10-CM

## 2022-10-31 PROCEDURE — 11721 DEBRIDE NAIL 6 OR MORE: CPT | Performed by: PODIATRIST

## 2022-10-31 NOTE — PROGRESS NOTES
Patient in today for nail care. Patient does not have any complaints of pain at this time.  Patient's PCP is Aleksandra Hutson MD date of last ov 10/10/22        Anjelica Key LPN

## 2022-12-09 ENCOUNTER — HOSPITAL ENCOUNTER (OUTPATIENT)
Age: 72
Discharge: HOME | End: 2022-12-11

## 2022-12-09 PROCEDURE — 88305 TISSUE EXAM BY PATHOLOGIST: CPT

## 2023-01-12 ENCOUNTER — OFFICE VISIT (OUTPATIENT)
Dept: FAMILY MEDICINE CLINIC | Age: 73
End: 2023-01-12

## 2023-01-12 VITALS
RESPIRATION RATE: 18 BRPM | TEMPERATURE: 96.9 F | HEART RATE: 90 BPM | OXYGEN SATURATION: 99 % | HEIGHT: 69 IN | SYSTOLIC BLOOD PRESSURE: 130 MMHG | DIASTOLIC BLOOD PRESSURE: 80 MMHG | WEIGHT: 224 LBS | BODY MASS INDEX: 33.18 KG/M2

## 2023-01-12 DIAGNOSIS — M46.1 SACROILIITIS (HCC): ICD-10-CM

## 2023-01-12 DIAGNOSIS — M54.42 ACUTE BILATERAL LOW BACK PAIN WITH BILATERAL SCIATICA: Primary | ICD-10-CM

## 2023-01-12 DIAGNOSIS — M54.41 ACUTE BILATERAL LOW BACK PAIN WITH BILATERAL SCIATICA: Primary | ICD-10-CM

## 2023-01-12 DIAGNOSIS — F32.0 CURRENT MILD EPISODE OF MAJOR DEPRESSIVE DISORDER, UNSPECIFIED WHETHER RECURRENT (HCC): ICD-10-CM

## 2023-01-12 RX ORDER — TIZANIDINE 4 MG/1
4 TABLET ORAL NIGHTLY PRN
Qty: 10 TABLET | Refills: 1 | Status: SHIPPED | OUTPATIENT
Start: 2023-01-12

## 2023-01-12 RX ORDER — METHYLPREDNISOLONE 4 MG/1
TABLET ORAL
Qty: 1 KIT | Refills: 0 | Status: SHIPPED | OUTPATIENT
Start: 2023-01-12 | End: 2023-01-18

## 2023-01-12 ASSESSMENT — PATIENT HEALTH QUESTIONNAIRE - PHQ9
SUM OF ALL RESPONSES TO PHQ9 QUESTIONS 1 & 2: 0
7. TROUBLE CONCENTRATING ON THINGS, SUCH AS READING THE NEWSPAPER OR WATCHING TELEVISION: 0
9. THOUGHTS THAT YOU WOULD BE BETTER OFF DEAD, OR OF HURTING YOURSELF: 0
3. TROUBLE FALLING OR STAYING ASLEEP: 0
SUM OF ALL RESPONSES TO PHQ QUESTIONS 1-9: 1
6. FEELING BAD ABOUT YOURSELF - OR THAT YOU ARE A FAILURE OR HAVE LET YOURSELF OR YOUR FAMILY DOWN: 0
2. FEELING DOWN, DEPRESSED OR HOPELESS: 0
5. POOR APPETITE OR OVEREATING: 0
8. MOVING OR SPEAKING SO SLOWLY THAT OTHER PEOPLE COULD HAVE NOTICED. OR THE OPPOSITE, BEING SO FIGETY OR RESTLESS THAT YOU HAVE BEEN MOVING AROUND A LOT MORE THAN USUAL: 0
SUM OF ALL RESPONSES TO PHQ QUESTIONS 1-9: 1
4. FEELING TIRED OR HAVING LITTLE ENERGY: 1
1. LITTLE INTEREST OR PLEASURE IN DOING THINGS: 0
10. IF YOU CHECKED OFF ANY PROBLEMS, HOW DIFFICULT HAVE THESE PROBLEMS MADE IT FOR YOU TO DO YOUR WORK, TAKE CARE OF THINGS AT HOME, OR GET ALONG WITH OTHER PEOPLE: 0
SUM OF ALL RESPONSES TO PHQ QUESTIONS 1-9: 1
SUM OF ALL RESPONSES TO PHQ QUESTIONS 1-9: 1

## 2023-01-12 NOTE — PROGRESS NOTES
OFFICE NOTE    1/12/23  Name: Rosy Ryan  CWB:[birth year]   Sex:male   Age:72 y.o. SUBJECTIVE  Chief Complaint   Patient presents with    Back Pain     X 1 month    Leg Pain     Bilateral  x1 month        HPI Desi Garcia came in with his usual complaints but hurt his back carrying a pot into the garage and is having some sciatica on both sides. Back is stiff. Review of Systems   Constitutional:  Positive for activity change and fatigue. Negative for appetite change and unexpected weight change. HENT:  Negative for congestion, rhinorrhea and sore throat. Eyes:  Negative for photophobia and redness. Complains of trouble with eyes since cataracts removed. Agreed to make f/u apt   Respiratory:  Negative for shortness of breath and wheezing. Cardiovascular:  Negative for chest pain, palpitations and leg swelling. Gastrointestinal:  Negative for abdominal pain and blood in stool. Genitourinary:  Positive for frequency and urgency. Negative for dysuria, flank pain and hematuria. Musculoskeletal:  Positive for arthralgias and back pain. Skin:  Negative for pallor and rash. Neurological:  Negative for tremors, seizures, syncope and numbness. Psychiatric/Behavioral:  Positive for dysphoric mood. Negative for confusion. The patient is nervous/anxious. All other systems reviewed and are negative.          Current Outpatient Medications:     tiZANidine (ZANAFLEX) 4 MG tablet, Take 1 tablet by mouth nightly as needed (low back pain), Disp: 10 tablet, Rfl: 1    methylPREDNISolone (MEDROL DOSEPACK) 4 MG tablet, Take by mouth., Disp: 1 kit, Rfl: 0    celecoxib (CELEBREX) 200 MG capsule, Take 1 capsule by mouth daily, Disp: 90 capsule, Rfl: 1    tamsulosin (FLOMAX) 0.4 MG capsule, Take 2 capsules by mouth daily With food, Disp: 180 capsule, Rfl: 1    DULoxetine (CYMBALTA) 30 MG extended release capsule, Take one capsule daily in 2 weeks can increase to 2 capsules daily and remain on this dose, Disp: 60 capsule, Rfl: 5    Red Yeast Rice 600 MG CAPS, Take by mouth, Disp: , Rfl:     Multiple Vitamins-Minerals (CENTRUM SILVER 50+MEN) TABS, Take by mouth, Disp: , Rfl:     tiZANidine (ZANAFLEX) 4 MG tablet, Take 1 tablet by mouth nightly as needed (back pain) (Patient not taking: Reported on 1/12/2023), Disp: 10 tablet, Rfl: 0    DULoxetine (CYMBALTA) 30 MG extended release capsule, Take 1 capsule by mouth daily, Disp: 30 capsule, Rfl: 5    ibuprofen (ADVIL;MOTRIN) 400 MG tablet, Take 400 mg by mouth every 6 hours as needed for Pain (Patient not taking: Reported on 1/12/2023), Disp: , Rfl:   Allergies   Allergen Reactions    Atorvastatin Calcium Other (See Comments)     Leg cramps      Ezetimibe-Simvastatin Other (See Comments)     Leg pain/cramping       No past medical history on file. No past surgical history on file. No family history on file. Social History       Tobacco History       Smoking Status  Some Days Smoking Tobacco Type  Cigarettes, Cigars      Smokeless Tobacco Use  Never              Alcohol History       Alcohol Use Status  Not Currently              Drug Use       Drug Use Status  Never              Sexual Activity       Sexually Active  Not Currently Partners  Female                    OBJECTIVE  Vitals:    01/12/23 1644   BP: 130/80   Pulse: 90   Resp: 18   Temp: 96.9 °F (36.1 °C)   TempSrc: Temporal   SpO2: 99%   Weight: 224 lb (101.6 kg)   Height: 5' 9\" (1.753 m)        Body mass index is 33.08 kg/m². No orders of the defined types were placed in this encounter. EXAM   Physical Exam  Vitals and nursing note reviewed. Constitutional:       Appearance: Normal appearance. He is obese. HENT:      Right Ear: Tympanic membrane normal.      Left Ear: Tympanic membrane normal.      Nose: No congestion. Mouth/Throat:      Pharynx: Oropharynx is clear. No posterior oropharyngeal erythema.    Eyes:      Conjunctiva/sclera: Conjunctivae normal.      Pupils: Pupils are equal, round, and reactive to light. Cardiovascular:      Rate and Rhythm: Normal rate and regular rhythm. Heart sounds: No murmur heard. Pulmonary:      Effort: Pulmonary effort is normal.      Breath sounds: Normal breath sounds. Abdominal:      General: Bowel sounds are normal.      Tenderness: There is no abdominal tenderness. Musculoskeletal:      Cervical back: No tenderness. Comments: Reduced flexion,extension, and lateral bending in back. SLRs equivocal possibly positive on left. Moderate OA hips and knees   Lymphadenopathy:      Cervical: No cervical adenopathy. Skin:     Coloration: Skin is not jaundiced or pale. Findings: No bruising or rash. Neurological:      General: No focal deficit present. Mental Status: He is alert and oriented to person, place, and time. Sensory: No sensory deficit. Motor: No weakness. Coordination: Coordination normal.      Gait: Gait normal.   Psychiatric:         Behavior: Behavior normal.         Angelina Torres was seen today for back pain and leg pain. Diagnoses and all orders for this visit:    Acute bilateral low back pain with bilateral sciatica  -     tiZANidine (ZANAFLEX) 4 MG tablet; Take 1 tablet by mouth nightly as needed (low back pain)  -     methylPREDNISolone (MEDROL DOSEPACK) 4 MG tablet; Take by mouth. Current mild episode of major depressive disorder, unspecified whether recurrent ([address]  Refuses counselling (had gone for a while) or further intervention. Wife sedentary and he is retired and not happy with this. We talk has not changed much at home        No follow-ups on file.     Electronically signed by Eliana Altman MD on 1/12/23 at 5:28 PM EST

## 2023-01-13 ASSESSMENT — ENCOUNTER SYMPTOMS
EYE REDNESS: 0
RHINORRHEA: 0
SHORTNESS OF BREATH: 0
WHEEZING: 0
ABDOMINAL PAIN: 0
BACK PAIN: 1
PHOTOPHOBIA: 0
SORE THROAT: 0
BLOOD IN STOOL: 0

## 2023-01-23 ENCOUNTER — OFFICE VISIT (OUTPATIENT)
Dept: FAMILY MEDICINE CLINIC | Age: 73
End: 2023-01-23
Payer: MEDICARE

## 2023-01-23 VITALS
OXYGEN SATURATION: 98 % | SYSTOLIC BLOOD PRESSURE: 128 MMHG | HEIGHT: 69 IN | TEMPERATURE: 97.5 F | HEART RATE: 82 BPM | BODY MASS INDEX: 34.98 KG/M2 | DIASTOLIC BLOOD PRESSURE: 78 MMHG | WEIGHT: 236.2 LBS | RESPIRATION RATE: 18 BRPM

## 2023-01-23 DIAGNOSIS — J40 BRONCHITIS: Primary | ICD-10-CM

## 2023-01-23 PROCEDURE — 1123F ACP DISCUSS/DSCN MKR DOCD: CPT | Performed by: STUDENT IN AN ORGANIZED HEALTH CARE EDUCATION/TRAINING PROGRAM

## 2023-01-23 PROCEDURE — G8427 DOCREV CUR MEDS BY ELIG CLIN: HCPCS | Performed by: STUDENT IN AN ORGANIZED HEALTH CARE EDUCATION/TRAINING PROGRAM

## 2023-01-23 PROCEDURE — 3017F COLORECTAL CA SCREEN DOC REV: CPT | Performed by: STUDENT IN AN ORGANIZED HEALTH CARE EDUCATION/TRAINING PROGRAM

## 2023-01-23 PROCEDURE — 4004F PT TOBACCO SCREEN RCVD TLK: CPT | Performed by: STUDENT IN AN ORGANIZED HEALTH CARE EDUCATION/TRAINING PROGRAM

## 2023-01-23 PROCEDURE — G8417 CALC BMI ABV UP PARAM F/U: HCPCS | Performed by: STUDENT IN AN ORGANIZED HEALTH CARE EDUCATION/TRAINING PROGRAM

## 2023-01-23 PROCEDURE — G8484 FLU IMMUNIZE NO ADMIN: HCPCS | Performed by: STUDENT IN AN ORGANIZED HEALTH CARE EDUCATION/TRAINING PROGRAM

## 2023-01-23 PROCEDURE — 99213 OFFICE O/P EST LOW 20 MIN: CPT | Performed by: STUDENT IN AN ORGANIZED HEALTH CARE EDUCATION/TRAINING PROGRAM

## 2023-01-23 RX ORDER — AZITHROMYCIN 250 MG/1
250 TABLET, FILM COATED ORAL SEE ADMIN INSTRUCTIONS
Qty: 6 TABLET | Refills: 0 | Status: SHIPPED | OUTPATIENT
Start: 2023-01-23 | End: 2023-01-28

## 2023-01-23 RX ORDER — PREDNISONE 10 MG/1
TABLET ORAL
Qty: 10 TABLET | Refills: 0 | Status: SHIPPED | OUTPATIENT
Start: 2023-01-23

## 2023-01-23 RX ORDER — BENZONATATE 200 MG/1
200 CAPSULE ORAL 3 TIMES DAILY PRN
Qty: 30 CAPSULE | Refills: 0 | Status: SHIPPED | OUTPATIENT
Start: 2023-01-23 | End: 2023-01-30

## 2023-01-23 ASSESSMENT — ENCOUNTER SYMPTOMS
COUGH: 1
NAUSEA: 0
RHINORRHEA: 0
WHEEZING: 0
SHORTNESS OF BREATH: 1
VOMITING: 0
ABDOMINAL PAIN: 0

## 2023-01-23 NOTE — PROGRESS NOTES
Fartun Noyola (:  1950) is a 67 y.o. male,Established patient, here for evaluation of the following chief complaint(s):  Cough (Onset x Thursday /Took cough meds Friday did not help/States that he does get bronchitis )         ASSESSMENT/PLAN:  1. Bronchitis  -     azithromycin (ZITHROMAX) 250 MG tablet; Take 1 tablet by mouth See Admin Instructions for 5 days 500mg on day 1 followed by 250mg on days 2 - 5, Disp-6 tablet, R-0Normal  -     predniSONE (DELTASONE) 10 MG tablet; Steroid taper  40mg 2 days= 8 tablets, 20mg 2 days= 4 tablets, 10mg 1 day= 1 tablet, Disp-10 tablet, R-0Normal  -     benzonatate (TESSALON) 200 MG capsule; Take 1 capsule by mouth 3 times daily as needed for Cough, Disp-30 capsule, R-0Normal  Given chronic cigar use will treat like a COPD exacerbation. Advised quick follow up with PCP-may want to consider long term inhalers. Doubt pneumonia but carefully discussed return precautions. Discussed return and ER precautions. Patient and or parent verbalized understanding    Return if symptoms worsen or fail to improve. Subjective   SUBJECTIVE/OBJECTIVE:  Cough  -recurrent for about 4-5 days  -reports hx of bronchitis  -no fever  -OK appetite  -Has taken some generic robatussin  -is currently on steroids  -reports he is a cigar smoker, just about every day      Review of Systems   Constitutional:  Negative for chills and fever. HENT:  Negative for congestion and rhinorrhea. Respiratory:  Positive for cough and shortness of breath (chronic). Negative for wheezing. Cardiovascular:  Negative for chest pain and leg swelling. Gastrointestinal:  Negative for abdominal pain, nausea and vomiting. Genitourinary:  Negative for dysuria and hematuria. Musculoskeletal:  Negative for arthralgias and myalgias. Skin:  Negative for rash and wound. Neurological:  Negative for dizziness and light-headedness. Objective   Physical Exam  Vitals reviewed.    Constitutional: General: He is not in acute distress. HENT:      Head: Normocephalic and atraumatic. Nose: Rhinorrhea present. Eyes:      Extraocular Movements: Extraocular movements intact. Conjunctiva/sclera: Conjunctivae normal.   Cardiovascular:      Rate and Rhythm: Normal rate and regular rhythm. Pulmonary:      Effort: Pulmonary effort is normal.      Breath sounds: Wheezing present. Abdominal:      General: Abdomen is flat. There is no distension. Musculoskeletal:         General: No tenderness or deformity. Neurological:      General: No focal deficit present. Mental Status: He is alert and oriented to person, place, and time. An electronic signature was used to authenticate this note.     --Devon Hahn MD

## 2023-01-30 ENCOUNTER — OFFICE VISIT (OUTPATIENT)
Dept: FAMILY MEDICINE CLINIC | Age: 73
End: 2023-01-30
Payer: MEDICARE

## 2023-01-30 VITALS
RESPIRATION RATE: 18 BRPM | TEMPERATURE: 98.1 F | SYSTOLIC BLOOD PRESSURE: 138 MMHG | HEART RATE: 91 BPM | OXYGEN SATURATION: 97 % | HEIGHT: 69 IN | WEIGHT: 234 LBS | DIASTOLIC BLOOD PRESSURE: 72 MMHG | BODY MASS INDEX: 34.66 KG/M2

## 2023-01-30 DIAGNOSIS — R05.9 COUGH, UNSPECIFIED TYPE: Primary | ICD-10-CM

## 2023-01-30 DIAGNOSIS — J01.90 ACUTE BACTERIAL SINUSITIS: ICD-10-CM

## 2023-01-30 DIAGNOSIS — B96.89 ACUTE BACTERIAL SINUSITIS: ICD-10-CM

## 2023-01-30 LAB
Lab: NORMAL
PERFORMING INSTRUMENT: NORMAL
QC PASS/FAIL: NORMAL
SARS-COV-2, POC: NORMAL

## 2023-01-30 PROCEDURE — 4004F PT TOBACCO SCREEN RCVD TLK: CPT | Performed by: FAMILY MEDICINE

## 2023-01-30 PROCEDURE — 87426 SARSCOV CORONAVIRUS AG IA: CPT | Performed by: FAMILY MEDICINE

## 2023-01-30 PROCEDURE — 1123F ACP DISCUSS/DSCN MKR DOCD: CPT | Performed by: FAMILY MEDICINE

## 2023-01-30 PROCEDURE — 99213 OFFICE O/P EST LOW 20 MIN: CPT | Performed by: FAMILY MEDICINE

## 2023-01-30 PROCEDURE — G8484 FLU IMMUNIZE NO ADMIN: HCPCS | Performed by: FAMILY MEDICINE

## 2023-01-30 PROCEDURE — 3017F COLORECTAL CA SCREEN DOC REV: CPT | Performed by: FAMILY MEDICINE

## 2023-01-30 PROCEDURE — G8427 DOCREV CUR MEDS BY ELIG CLIN: HCPCS | Performed by: FAMILY MEDICINE

## 2023-01-30 PROCEDURE — G8417 CALC BMI ABV UP PARAM F/U: HCPCS | Performed by: FAMILY MEDICINE

## 2023-01-30 RX ORDER — GUAIFENESIN 600 MG/1
600 TABLET, EXTENDED RELEASE ORAL 2 TIMES DAILY
Qty: 30 TABLET | Refills: 0 | Status: SHIPPED | OUTPATIENT
Start: 2023-01-30 | End: 2023-02-14

## 2023-01-30 RX ORDER — HYDROCODONE BITARTRATE AND HOMATROPINE METHYLBROMIDE ORAL SOLUTION 5; 1.5 MG/5ML; MG/5ML
5 LIQUID ORAL 4 TIMES DAILY PRN
Qty: 150 ML | Refills: 0 | Status: SHIPPED | OUTPATIENT
Start: 2023-01-30 | End: 2023-02-13

## 2023-01-30 RX ORDER — AMOXICILLIN AND CLAVULANATE POTASSIUM 875; 125 MG/1; MG/1
1 TABLET, FILM COATED ORAL 2 TIMES DAILY
Qty: 20 TABLET | Refills: 0 | Status: SHIPPED | OUTPATIENT
Start: 2023-01-30 | End: 2023-02-09

## 2023-01-30 NOTE — PROGRESS NOTES
OFFICE NOTE    1/30/23  Name: Lor Haynes  SAMY:[birth year]   Sex:male   Age:72 y.o. SUBJECTIVE  Chief Complaint   Patient presents with    Cough       HPI comes in for continued sinusitis complaints and cough. Falguni Aland accompanies him. Was seen a week ago and put on azithromycin and pred pack. Hated the later. Says he too cymbalta for 2 mos and noticed nothing. Seems a little more cheerful today but stopped cymbalta about 2 mos ago    Review of Systems   Purulent PND. Cough keeping him awake, not wheezing      Current Outpatient Medications:     amoxicillin-clavulanate (AUGMENTIN) 875-125 MG per tablet, Take 1 tablet by mouth 2 times daily for 10 days, Disp: 20 tablet, Rfl: 0    guaiFENesin (MUCINEX) 600 MG extended release tablet, Take 1 tablet by mouth 2 times daily for 15 days, Disp: 30 tablet, Rfl: 0    HYDROcodone homatropine (HYCODAN) 5-1.5 MG/5ML solution, Take 5 mLs by mouth 4 times daily as needed (cough) for up to 14 days.  Max Daily Amount: 20 mLs, Disp: 150 mL, Rfl: 0    predniSONE (DELTASONE) 10 MG tablet, Steroid taper  40mg 2 days= 8 tablets, 20mg 2 days= 4 tablets, 10mg 1 day= 1 tablet, Disp: 10 tablet, Rfl: 0    tiZANidine (ZANAFLEX) 4 MG tablet, Take 1 tablet by mouth nightly as needed (low back pain), Disp: 10 tablet, Rfl: 1    celecoxib (CELEBREX) 200 MG capsule, Take 1 capsule by mouth daily, Disp: 90 capsule, Rfl: 1    tamsulosin (FLOMAX) 0.4 MG capsule, Take 2 capsules by mouth daily With food, Disp: 180 capsule, Rfl: 1    Red Yeast Rice 600 MG CAPS, Take by mouth, Disp: , Rfl:     Multiple Vitamins-Minerals (CENTRUM SILVER 50+MEN) TABS, Take by mouth, Disp: , Rfl:     benzonatate (TESSALON) 200 MG capsule, Take 1 capsule by mouth 3 times daily as needed for Cough (Patient not taking: Reported on 1/30/2023), Disp: 30 capsule, Rfl: 0    tiZANidine (ZANAFLEX) 4 MG tablet, Take 1 tablet by mouth nightly as needed (back pain) (Patient not taking: No sig reported), Disp: 10 tablet, Rfl: 0 ibuprofen (ADVIL;MOTRIN) 400 MG tablet, Take 400 mg by mouth every 6 hours as needed for Pain (Patient not taking: No sig reported), Disp: , Rfl:   Allergies   Allergen Reactions    Atorvastatin Calcium Other (See Comments)     Leg cramps      Ezetimibe-Simvastatin Other (See Comments)     Leg pain/cramping       No past medical history on file. No past surgical history on file. No family history on file. Social History       Tobacco History       Smoking Status  Some Days Smoking Tobacco Type  Cigarettes, Cigars      Smokeless Tobacco Use  Never              Alcohol History       Alcohol Use Status  Not Currently              Drug Use       Drug Use Status  Never              Sexual Activity       Sexually Active  Not Currently Partners  Female                    OBJECTIVE  Vitals:    01/30/23 1457   BP: 138/72   Pulse: 91   Resp: 18   Temp: 98.1 °F (36.7 °C)   TempSrc: Temporal   SpO2: 97%   Weight: 234 lb (106.1 kg)   Height: 5' 9\" (1.753 m)        Body mass index is 34.56 kg/m². Orders Placed This Encounter   Procedures    POCT COVID-19, Antigen     Order Specific Question:   Pregnant: Answer:   No        EXAM   Physical Exam  Vitals and nursing note reviewed. Constitutional:       Appearance: Normal appearance. He is obese. HENT:      Right Ear: Tympanic membrane and external ear normal.      Left Ear: Tympanic membrane and external ear normal.      Nose: Congestion and rhinorrhea present. Mouth/Throat:      Pharynx: Posterior oropharyngeal erythema present. Eyes:      Pupils: Pupils are equal, round, and reactive to light. Cardiovascular:      Rate and Rhythm: Normal rate and regular rhythm. Heart sounds: No murmur heard. Pulmonary:      Effort: Pulmonary effort is normal.      Breath sounds: Rhonchi present. No wheezing or rales. Musculoskeletal:      Cervical back: Tenderness present. Lymphadenopathy:      Cervical: No cervical adenopathy.    Skin:     Coloration: Skin is not jaundiced or pale. Findings: No bruising or rash. Neurological:      General: No focal deficit present. Mental Status: He is alert and oriented to person, place, and time. Psychiatric:         Mood and Affect: Mood normal.         Behavior: Behavior normal.         Elizabeth Nalyor was seen today for cough. Diagnoses and all orders for this visit:    Cough, unspecified type  -     POCT COVID-19, Antigen  -     amoxicillin-clavulanate (AUGMENTIN) 875-125 MG per tablet; Take 1 tablet by mouth 2 times daily for 10 days  -     guaiFENesin (MUCINEX) 600 MG extended release tablet; Take 1 tablet by mouth 2 times daily for 15 days  -     HYDROcodone homatropine (HYCODAN) 5-1.5 MG/5ML solution; Take 5 mLs by mouth 4 times daily as needed (cough) for up to 14 days. Max Daily Amount: 20 mLs    Acute bacterial sinusitis  -     amoxicillin-clavulanate (AUGMENTIN) 875-125 MG per tablet; Take 1 tablet by mouth 2 times daily for 10 days  -     guaiFENesin (MUCINEX) 600 MG extended release tablet; Take 1 tablet by mouth 2 times daily for 15 days  -     HYDROcodone homatropine (HYCODAN) 5-1.5 MG/5ML solution; Take 5 mLs by mouth 4 times daily as needed (cough) for up to 14 days. Max Daily Amount: 20 mLs    Will try him on a better antibiotc and humor him with the hydrocodone cough syrup for a while. He does seem a little better to me mentally not as negative    No follow-ups on file.     Electronically signed by Binta Odonnell MD on 1/30/23 at 3:15 PM EST

## 2023-02-28 ENCOUNTER — TELEPHONE (OUTPATIENT)
Dept: FAMILY MEDICINE CLINIC | Age: 73
End: 2023-02-28

## 2023-02-28 DIAGNOSIS — M54.41 ACUTE BILATERAL LOW BACK PAIN WITH BILATERAL SCIATICA: ICD-10-CM

## 2023-02-28 DIAGNOSIS — M54.42 ACUTE BILATERAL LOW BACK PAIN WITH BILATERAL SCIATICA: ICD-10-CM

## 2023-02-28 RX ORDER — TIZANIDINE 4 MG/1
4 TABLET ORAL NIGHTLY PRN
Qty: 10 TABLET | Refills: 1 | Status: CANCELLED | OUTPATIENT
Start: 2023-02-28

## 2023-02-28 RX ORDER — TIZANIDINE 4 MG/1
4 TABLET ORAL NIGHTLY PRN
Qty: 20 TABLET | Refills: 1 | Status: SHIPPED | OUTPATIENT
Start: 2023-02-28

## 2023-02-28 NOTE — TELEPHONE ENCOUNTER
Donna Cornelius calling in needs a refill on his   TIZANIDINE to be sent to [facility] in [facility]. Rivastigmine 9.5 mg patches were refilled yesterday 12/9/2020.

## 2023-06-01 ENCOUNTER — TELEPHONE (OUTPATIENT)
Dept: FAMILY MEDICINE CLINIC | Age: 73
End: 2023-06-01

## 2023-07-07 ENCOUNTER — OFFICE VISIT (OUTPATIENT)
Dept: PODIATRY | Age: 73
End: 2023-07-07
Payer: MEDICARE

## 2023-07-07 VITALS — WEIGHT: 234 LBS | BODY MASS INDEX: 34.66 KG/M2 | HEIGHT: 69 IN

## 2023-07-07 DIAGNOSIS — B35.1 ONYCHOMYCOSIS: Primary | ICD-10-CM

## 2023-07-07 DIAGNOSIS — I87.2 VENOUS INSUFFICIENCY (CHRONIC) (PERIPHERAL): ICD-10-CM

## 2023-07-07 DIAGNOSIS — M79.675 PAIN OF TOE OF LEFT FOOT: ICD-10-CM

## 2023-07-07 DIAGNOSIS — M79.674 PAIN OF TOE OF RIGHT FOOT: ICD-10-CM

## 2023-07-07 DIAGNOSIS — R26.2 DIFFICULTY WALKING: ICD-10-CM

## 2023-07-07 DIAGNOSIS — M25.572 LEFT ANKLE PAIN, UNSPECIFIED CHRONICITY: ICD-10-CM

## 2023-07-07 DIAGNOSIS — M65.9 SYNOVITIS OF LEFT ANKLE: ICD-10-CM

## 2023-07-07 DIAGNOSIS — I73.9 PVD (PERIPHERAL VASCULAR DISEASE) (HCC): ICD-10-CM

## 2023-07-07 PROCEDURE — 1123F ACP DISCUSS/DSCN MKR DOCD: CPT | Performed by: PODIATRIST

## 2023-07-07 PROCEDURE — 3017F COLORECTAL CA SCREEN DOC REV: CPT | Performed by: PODIATRIST

## 2023-07-07 PROCEDURE — 99212 OFFICE O/P EST SF 10 MIN: CPT | Performed by: PODIATRIST

## 2023-07-07 PROCEDURE — 4004F PT TOBACCO SCREEN RCVD TLK: CPT | Performed by: PODIATRIST

## 2023-07-07 PROCEDURE — G8417 CALC BMI ABV UP PARAM F/U: HCPCS | Performed by: PODIATRIST

## 2023-07-07 PROCEDURE — G8427 DOCREV CUR MEDS BY ELIG CLIN: HCPCS | Performed by: PODIATRIST

## 2023-07-07 PROCEDURE — 11721 DEBRIDE NAIL 6 OR MORE: CPT | Performed by: PODIATRIST

## 2023-07-07 NOTE — PROGRESS NOTES
Patient is here today for an evaluation of the left ankle. He complains of left ankle pain for the past 2 months. He denies an injury and states it is an arthritis flare up. He has been applying icy hot to the area. Nail care requested today as well.

## 2023-07-07 NOTE — PROGRESS NOTES
23     Juany Pan    : 1950  Sex: male  Age: 67 y.o. Subjective: The patient is seen today for evaluation regarding foot evaluation and mycotic nail care. No other complaints noted. Chief Complaint   Patient presents with    Ankle Pain    Nail Problem     Left ankle pain  Routine nail care        Current Medications:    Current Outpatient Medications:     tiZANidine (ZANAFLEX) 4 MG tablet, Take 1 tablet by mouth nightly as needed (low back pain), Disp: 20 tablet, Rfl: 1    predniSONE (DELTASONE) 10 MG tablet, Steroid taper  40mg 2 days= 8 tablets, 20mg 2 days= 4 tablets, 10mg 1 day= 1 tablet, Disp: 10 tablet, Rfl: 0    celecoxib (CELEBREX) 200 MG capsule, Take 1 capsule by mouth daily, Disp: 90 capsule, Rfl: 1    tamsulosin (FLOMAX) 0.4 MG capsule, Take 2 capsules by mouth daily With food, Disp: 180 capsule, Rfl: 1    ibuprofen (ADVIL;MOTRIN) 400 MG tablet, Take 1 tablet by mouth every 6 hours as needed for Pain, Disp: , Rfl:     Red Yeast Rice 600 MG CAPS, Take by mouth, Disp: , Rfl:     Multiple Vitamins-Minerals (CENTRUM SILVER 50+MEN) TABS, Take by mouth, Disp: , Rfl:     Allergies: Allergies   Allergen Reactions    Atorvastatin Calcium Other (See Comments)     Leg cramps      Ezetimibe-Simvastatin Other (See Comments)     Leg pain/cramping       No past surgical history on file. No past medical history on file. Vitals:    23 0941   Weight: 234 lb (106.1 kg)   Height: 5' 9\" (1.753 m)       Exam:  Pedal pulses diminished to palpation bilateral foot. At this time the nail/s 1, 2, 5 right foot and nail/s 1, 2, 5 left foot are noted to be thickened, dystrophic and discolored with subungual debris present. Tenderness noted to palpation. Minimal hair growth is noted to both lower extremities. Edema noted with both varicosities and stasis skin changes present bilaterally. Coolness is noted to the digital regions to palpation.   Capillary fill time delayed digital areas

## 2023-07-10 ENCOUNTER — OFFICE VISIT (OUTPATIENT)
Dept: FAMILY MEDICINE CLINIC | Age: 73
End: 2023-07-10
Payer: MEDICARE

## 2023-07-10 VITALS
WEIGHT: 233 LBS | SYSTOLIC BLOOD PRESSURE: 126 MMHG | HEIGHT: 69 IN | RESPIRATION RATE: 18 BRPM | HEART RATE: 57 BPM | TEMPERATURE: 97.9 F | DIASTOLIC BLOOD PRESSURE: 82 MMHG | OXYGEN SATURATION: 97 % | BODY MASS INDEX: 34.51 KG/M2

## 2023-07-10 DIAGNOSIS — I10 HYPERTENSION, UNSPECIFIED TYPE: Primary | ICD-10-CM

## 2023-07-10 DIAGNOSIS — M54.41 ACUTE BILATERAL LOW BACK PAIN WITH BILATERAL SCIATICA: ICD-10-CM

## 2023-07-10 DIAGNOSIS — R35.0 BENIGN PROSTATIC HYPERPLASIA WITH URINARY FREQUENCY: ICD-10-CM

## 2023-07-10 DIAGNOSIS — N40.1 BENIGN PROSTATIC HYPERPLASIA WITH URINARY FREQUENCY: ICD-10-CM

## 2023-07-10 DIAGNOSIS — M54.42 ACUTE BILATERAL LOW BACK PAIN WITH BILATERAL SCIATICA: ICD-10-CM

## 2023-07-10 DIAGNOSIS — M19.022 PRIMARY OSTEOARTHRITIS OF BOTH ELBOWS: ICD-10-CM

## 2023-07-10 DIAGNOSIS — M19.021 PRIMARY OSTEOARTHRITIS OF BOTH ELBOWS: ICD-10-CM

## 2023-07-10 PROCEDURE — 1123F ACP DISCUSS/DSCN MKR DOCD: CPT | Performed by: FAMILY MEDICINE

## 2023-07-10 PROCEDURE — 3079F DIAST BP 80-89 MM HG: CPT | Performed by: FAMILY MEDICINE

## 2023-07-10 PROCEDURE — 3017F COLORECTAL CA SCREEN DOC REV: CPT | Performed by: FAMILY MEDICINE

## 2023-07-10 PROCEDURE — G8427 DOCREV CUR MEDS BY ELIG CLIN: HCPCS | Performed by: FAMILY MEDICINE

## 2023-07-10 PROCEDURE — 99214 OFFICE O/P EST MOD 30 MIN: CPT | Performed by: FAMILY MEDICINE

## 2023-07-10 PROCEDURE — 93000 ELECTROCARDIOGRAM COMPLETE: CPT | Performed by: FAMILY MEDICINE

## 2023-07-10 PROCEDURE — 4004F PT TOBACCO SCREEN RCVD TLK: CPT | Performed by: FAMILY MEDICINE

## 2023-07-10 PROCEDURE — 3074F SYST BP LT 130 MM HG: CPT | Performed by: FAMILY MEDICINE

## 2023-07-10 PROCEDURE — G8417 CALC BMI ABV UP PARAM F/U: HCPCS | Performed by: FAMILY MEDICINE

## 2023-07-10 RX ORDER — TIZANIDINE 4 MG/1
4 TABLET ORAL NIGHTLY PRN
Qty: 20 TABLET | Refills: 1 | Status: CANCELLED | OUTPATIENT
Start: 2023-07-10

## 2023-07-10 RX ORDER — CELECOXIB 200 MG/1
200 CAPSULE ORAL DAILY
Qty: 90 CAPSULE | Refills: 1 | Status: CANCELLED | OUTPATIENT
Start: 2023-07-10

## 2023-07-10 RX ORDER — TAMSULOSIN HYDROCHLORIDE 0.4 MG/1
0.8 CAPSULE ORAL DAILY
Qty: 180 CAPSULE | Refills: 1 | Status: CANCELLED | OUTPATIENT
Start: 2023-07-10

## 2023-07-10 SDOH — ECONOMIC STABILITY: FOOD INSECURITY: WITHIN THE PAST 12 MONTHS, YOU WORRIED THAT YOUR FOOD WOULD RUN OUT BEFORE YOU GOT MONEY TO BUY MORE.: NEVER TRUE

## 2023-07-10 SDOH — ECONOMIC STABILITY: INCOME INSECURITY: HOW HARD IS IT FOR YOU TO PAY FOR THE VERY BASICS LIKE FOOD, HOUSING, MEDICAL CARE, AND HEATING?: NOT HARD AT ALL

## 2023-07-10 SDOH — ECONOMIC STABILITY: HOUSING INSECURITY
IN THE LAST 12 MONTHS, WAS THERE A TIME WHEN YOU DID NOT HAVE A STEADY PLACE TO SLEEP OR SLEPT IN A SHELTER (INCLUDING NOW)?: NO

## 2023-07-10 SDOH — ECONOMIC STABILITY: FOOD INSECURITY: WITHIN THE PAST 12 MONTHS, THE FOOD YOU BOUGHT JUST DIDN'T LAST AND YOU DIDN'T HAVE MONEY TO GET MORE.: NEVER TRUE

## 2023-07-10 ASSESSMENT — ENCOUNTER SYMPTOMS
SORE THROAT: 0
EYE REDNESS: 0
SINUS PAIN: 0
BLOOD IN STOOL: 0
WHEEZING: 0
DIARRHEA: 0
PHOTOPHOBIA: 0
CONSTIPATION: 0
SHORTNESS OF BREATH: 0
VOMITING: 0
COUGH: 0
BACK PAIN: 0
TROUBLE SWALLOWING: 0
ABDOMINAL PAIN: 0

## 2023-07-10 NOTE — PROGRESS NOTES
urinary frequency  Discussed retrograde ejaculation. Will try finasteride, can discuss with Dr. Risa Flores at coming apt. If switching alpha blockers would help, I suspect it would not  Primary osteoarthritis of both elbows  Has pain bilaterally, not much noted on x-rays from a year ago. Acute bilateral low back pain with bilateral sciatica  Sleeps in recliner. Stooped. Sounds like a degree of spinal stenosis, but may just be facet arthropathy. Shots did not help much        No follow-ups on file.     Electronically signed by Michael Loza MD on 7/10/23 at 1:35 PM EDT

## 2023-07-11 ENCOUNTER — TELEPHONE (OUTPATIENT)
Dept: FAMILY MEDICINE CLINIC | Age: 73
End: 2023-07-11

## 2023-07-11 DIAGNOSIS — M19.022 PRIMARY OSTEOARTHRITIS OF BOTH ELBOWS: ICD-10-CM

## 2023-07-11 DIAGNOSIS — R35.0 BENIGN PROSTATIC HYPERPLASIA WITH URINARY FREQUENCY: ICD-10-CM

## 2023-07-11 DIAGNOSIS — M54.50 CHRONIC BILATERAL LOW BACK PAIN WITHOUT SCIATICA: Primary | ICD-10-CM

## 2023-07-11 DIAGNOSIS — M19.021 PRIMARY OSTEOARTHRITIS OF BOTH ELBOWS: ICD-10-CM

## 2023-07-11 DIAGNOSIS — N40.1 BENIGN PROSTATIC HYPERPLASIA WITH URINARY FREQUENCY: ICD-10-CM

## 2023-07-11 DIAGNOSIS — G89.29 CHRONIC BILATERAL LOW BACK PAIN WITHOUT SCIATICA: Primary | ICD-10-CM

## 2023-07-11 RX ORDER — TIZANIDINE 4 MG/1
4 TABLET ORAL NIGHTLY PRN
Qty: 20 TABLET | Refills: 1 | Status: SHIPPED | OUTPATIENT
Start: 2023-07-11 | End: 2023-10-09

## 2023-07-11 RX ORDER — CELECOXIB 200 MG/1
200 CAPSULE ORAL DAILY
Qty: 90 CAPSULE | Refills: 1 | Status: SHIPPED | OUTPATIENT
Start: 2023-07-11

## 2023-07-11 RX ORDER — FINASTERIDE 5 MG/1
5 TABLET, FILM COATED ORAL DAILY
Qty: 30 TABLET | Refills: 5 | Status: SHIPPED | OUTPATIENT
Start: 2023-07-11

## 2023-07-11 NOTE — TELEPHONE ENCOUNTER
I left message to update pt. And  sent them to [facility] in error. I phoned rxs into family drug provider line I was unable to reach a live person.   tiZANidine (ZANAFLEX) 4 MG tablet [[id number]]     Order Details  Dose: 4 mg Route: Oral Frequency: NIGHTLY PRN for low back pain   Dispense Quantity: 20 tablet Refills: 1          Sig: Take 1 tablet by mouth nightly as needed (low back pain)         finasteride (PROSCAR) 5 MG tablet [[id number]]     Order Details  Dose: 5 mg Route: Oral Frequency: DAILY   Dispense Quantity: 30 tablet Refills: 5          Sig: Take 1 tablet by mouth daily

## 2023-07-11 NOTE — TELEPHONE ENCOUNTER
It was finasteride for his prostate and tizanadine for his back.  Sorry about confusion, sent in to   [facility]

## 2023-07-11 NOTE — TELEPHONE ENCOUNTER
Patient called back to check on status. I let him know that you had not had a chance to review. Believes that you were sending a Zpack for his back and another medication for his prostate. These scripts will go to [facility].

## 2023-07-11 NOTE — TELEPHONE ENCOUNTER
Pt calling in states he was in for an appt yesterday and  mentioned sending in rxs; one for back pain and one to work with tamulosin. Family drug did not reviece rxs.  Please advise

## 2023-07-11 NOTE — TELEPHONE ENCOUNTER
Patient had requested a refill on Celebrex when at his appointment but Rx was not sent. Patient fills this at [facility] because it is more affordable.      Rx is ready to be sent

## 2023-09-08 ENCOUNTER — OFFICE VISIT (OUTPATIENT)
Dept: PODIATRY | Age: 73
End: 2023-09-08

## 2023-09-08 VITALS — HEIGHT: 69 IN | BODY MASS INDEX: 34.07 KG/M2 | WEIGHT: 230 LBS

## 2023-09-08 DIAGNOSIS — B35.1 ONYCHOMYCOSIS: Primary | ICD-10-CM

## 2023-09-08 DIAGNOSIS — M65.9 SYNOVITIS OF LEFT ANKLE: ICD-10-CM

## 2023-09-08 DIAGNOSIS — M79.675 PAIN OF TOE OF LEFT FOOT: ICD-10-CM

## 2023-09-08 DIAGNOSIS — I87.2 VENOUS INSUFFICIENCY (CHRONIC) (PERIPHERAL): ICD-10-CM

## 2023-09-08 DIAGNOSIS — M19.072 ARTHRITIS OF LEFT ANKLE: ICD-10-CM

## 2023-09-08 DIAGNOSIS — M79.674 PAIN OF TOE OF RIGHT FOOT: ICD-10-CM

## 2023-09-08 DIAGNOSIS — R26.2 DIFFICULTY WALKING: ICD-10-CM

## 2023-09-08 DIAGNOSIS — I73.9 PVD (PERIPHERAL VASCULAR DISEASE) (HCC): ICD-10-CM

## 2023-09-09 NOTE — PROGRESS NOTES
Patient is in today for routine nail care and to go over xray of left foot/ankle.  Pcp is Yuval Mitchell MD  Last ov 7/10/23
growth is noted to both lower extremities. Edema noted with both varicosities and stasis skin changes present bilateral.  Coolness is noted to the digital regions to palpation. Capillary fill time delayed digital areas bilateral foot. No heel fissuring or macerations of the web spaces. No plantar calluses and/or ulcerative areas are noted. Tenderness noted to palpation into the left hindfoot and ankle region. Patient is having difficulty with gait/walking. Plan Per Assessment  Jenny Snellen was seen today for nail problem and foot pain. Diagnoses and all orders for this visit:    Onychomycosis    Pain of toe of right foot    Pain of toe of left foot    PVD (peripheral vascular disease) (HCC)    Venous insufficiency (chronic) (peripheral)    Synovitis of left ankle    Arthritis of left ankle    Difficulty walking        1. Evaluation and Management  2. Manual and electrical debridement of the mycotic nails was performed for thickness and length to prevent injection and/or ulceration. 3. Discussed additional arthritic lower extremity care techniques with patient today. Patient was dispensed an ankle support on today's visit to be utilized left lower extremity to reduce current symptoms. 4. We did discuss importance of shoe gear and foot inspection to prevent potential issues. 5. We will see the patient back at a later date for continued podiatric management and care. Patient was advised to call the office with any questions or concerns prior to their next appointment if needed. Seen By:    Shravan Rod DPM    Electronically signed by Shravan Rod DPM on 9/9/2023 at 3:26 PM      This note was created using voice recognition software. The note was reviewed however may contain grammatical errors.

## 2023-10-16 ENCOUNTER — OFFICE VISIT (OUTPATIENT)
Dept: FAMILY MEDICINE CLINIC | Age: 73
End: 2023-10-16
Payer: MEDICARE

## 2023-10-16 VITALS
TEMPERATURE: 97.9 F | OXYGEN SATURATION: 98 % | HEART RATE: 100 BPM | RESPIRATION RATE: 18 BRPM | WEIGHT: 231 LBS | HEIGHT: 69 IN | DIASTOLIC BLOOD PRESSURE: 72 MMHG | SYSTOLIC BLOOD PRESSURE: 126 MMHG | BODY MASS INDEX: 34.21 KG/M2

## 2023-10-16 DIAGNOSIS — M19.021 PRIMARY OSTEOARTHRITIS OF BOTH ELBOWS: ICD-10-CM

## 2023-10-16 DIAGNOSIS — F32.89 OTHER DEPRESSION: Primary | ICD-10-CM

## 2023-10-16 DIAGNOSIS — Z23 IMMUNIZATION DUE: ICD-10-CM

## 2023-10-16 DIAGNOSIS — M19.022 PRIMARY OSTEOARTHRITIS OF BOTH ELBOWS: ICD-10-CM

## 2023-10-16 DIAGNOSIS — R35.0 BENIGN PROSTATIC HYPERPLASIA WITH URINARY FREQUENCY: ICD-10-CM

## 2023-10-16 DIAGNOSIS — N40.1 BENIGN PROSTATIC HYPERPLASIA WITH URINARY FREQUENCY: ICD-10-CM

## 2023-10-16 DIAGNOSIS — E78.49 OTHER HYPERLIPIDEMIA: ICD-10-CM

## 2023-10-16 LAB
BILIRUB UR QL STRIP: NEGATIVE
CLARITY UR: CLEAR
COLOR UR: YELLOW
GLUCOSE UR STRIP-MCNC: NEGATIVE MG/DL
HGB UR QL STRIP.AUTO: NEGATIVE
KETONES UR STRIP-MCNC: ABNORMAL MG/DL
LEUKOCYTE ESTERASE UR QL STRIP: ABNORMAL
NITRITE UR QL STRIP: NEGATIVE
PH UR STRIP: 6 [PH] (ref 5–9)
PROT UR STRIP-MCNC: NEGATIVE MG/DL
SP GR UR STRIP: >1.03 (ref 1–1.03)
UROBILINOGEN UR STRIP-ACNC: 0.2 EU/DL (ref 0–1)

## 2023-10-16 PROCEDURE — 3017F COLORECTAL CA SCREEN DOC REV: CPT | Performed by: FAMILY MEDICINE

## 2023-10-16 PROCEDURE — 99214 OFFICE O/P EST MOD 30 MIN: CPT | Performed by: FAMILY MEDICINE

## 2023-10-16 PROCEDURE — G8427 DOCREV CUR MEDS BY ELIG CLIN: HCPCS | Performed by: FAMILY MEDICINE

## 2023-10-16 PROCEDURE — 4004F PT TOBACCO SCREEN RCVD TLK: CPT | Performed by: FAMILY MEDICINE

## 2023-10-16 PROCEDURE — 1123F ACP DISCUSS/DSCN MKR DOCD: CPT | Performed by: FAMILY MEDICINE

## 2023-10-16 PROCEDURE — 90694 VACC AIIV4 NO PRSRV 0.5ML IM: CPT | Performed by: FAMILY MEDICINE

## 2023-10-16 PROCEDURE — G8484 FLU IMMUNIZE NO ADMIN: HCPCS | Performed by: FAMILY MEDICINE

## 2023-10-16 PROCEDURE — G8417 CALC BMI ABV UP PARAM F/U: HCPCS | Performed by: FAMILY MEDICINE

## 2023-10-16 PROCEDURE — G0008 ADMIN INFLUENZA VIRUS VAC: HCPCS | Performed by: FAMILY MEDICINE

## 2023-10-16 RX ORDER — TAMSULOSIN HYDROCHLORIDE 0.4 MG/1
0.8 CAPSULE ORAL DAILY
Qty: 180 CAPSULE | Refills: 1 | Status: SHIPPED | OUTPATIENT
Start: 2023-10-16

## 2023-10-16 RX ORDER — DULOXETIN HYDROCHLORIDE 20 MG/1
40 CAPSULE, DELAYED RELEASE ORAL DAILY
Qty: 60 CAPSULE | Refills: 3 | Status: SHIPPED | OUTPATIENT
Start: 2023-10-16

## 2023-10-16 RX ORDER — CELECOXIB 200 MG/1
200 CAPSULE ORAL DAILY
Qty: 90 CAPSULE | Refills: 1 | Status: SHIPPED | OUTPATIENT
Start: 2023-10-16

## 2023-10-16 RX ORDER — CELECOXIB 200 MG/1
200 CAPSULE ORAL DAILY
Qty: 90 CAPSULE | Refills: 1 | Status: SHIPPED
Start: 2023-10-16 | End: 2023-10-16

## 2023-10-16 ASSESSMENT — ENCOUNTER SYMPTOMS
SHORTNESS OF BREATH: 0
DIARRHEA: 0
PHOTOPHOBIA: 0
VOMITING: 0
SORE THROAT: 0
BACK PAIN: 0
CONSTIPATION: 0
COUGH: 0
SINUS PAIN: 0
EYE REDNESS: 0
WHEEZING: 0
ABDOMINAL PAIN: 0
TROUBLE SWALLOWING: 0
BLOOD IN STOOL: 0

## 2023-10-16 ASSESSMENT — PATIENT HEALTH QUESTIONNAIRE - PHQ9
8. MOVING OR SPEAKING SO SLOWLY THAT OTHER PEOPLE COULD HAVE NOTICED. OR THE OPPOSITE, BEING SO FIGETY OR RESTLESS THAT YOU HAVE BEEN MOVING AROUND A LOT MORE THAN USUAL: 1
1. LITTLE INTEREST OR PLEASURE IN DOING THINGS: 3
7. TROUBLE CONCENTRATING ON THINGS, SUCH AS READING THE NEWSPAPER OR WATCHING TELEVISION: 0
5. POOR APPETITE OR OVEREATING: 0
SUM OF ALL RESPONSES TO PHQ QUESTIONS 1-9: 9
SUM OF ALL RESPONSES TO PHQ QUESTIONS 1-9: 9
SUM OF ALL RESPONSES TO PHQ9 QUESTIONS 1 & 2: 4
SUM OF ALL RESPONSES TO PHQ QUESTIONS 1-9: 9
3. TROUBLE FALLING OR STAYING ASLEEP: 1
SUM OF ALL RESPONSES TO PHQ QUESTIONS 1-9: 9
6. FEELING BAD ABOUT YOURSELF - OR THAT YOU ARE A FAILURE OR HAVE LET YOURSELF OR YOUR FAMILY DOWN: 0
4. FEELING TIRED OR HAVING LITTLE ENERGY: 3
2. FEELING DOWN, DEPRESSED OR HOPELESS: 1
10. IF YOU CHECKED OFF ANY PROBLEMS, HOW DIFFICULT HAVE THESE PROBLEMS MADE IT FOR YOU TO DO YOUR WORK, TAKE CARE OF THINGS AT HOME, OR GET ALONG WITH OTHER PEOPLE: 0
9. THOUGHTS THAT YOU WOULD BE BETTER OFF DEAD, OR OF HURTING YOURSELF: 0

## 2023-10-17 LAB
BACTERIA URNS QL MICRO: ABNORMAL
RBC #/AREA URNS HPF: ABNORMAL /HPF
WBC #/AREA URNS HPF: ABNORMAL /HPF

## 2023-10-19 ENCOUNTER — TELEPHONE (OUTPATIENT)
Dept: FAMILY MEDICINE CLINIC | Age: 73
End: 2023-10-19

## 2023-10-19 NOTE — TELEPHONE ENCOUNTER
----- Message from Jonnathan Marks MD sent at 10/18/2023  6:01 PM EDT -----  Cholesterol higher than I would like it,, otherwise looks good.

## 2023-10-20 LAB
PROSTATE SPECIFIC ANTIGEN FREE: 0.7 NG/ML
PROSTATE SPECIFIC ANTIGEN PERCENT FREE: 18 %
PROSTATE SPECIFIC ANTIGEN: 4 NG/ML (ref 0–4)

## 2023-11-10 ENCOUNTER — OFFICE VISIT (OUTPATIENT)
Dept: PODIATRY | Age: 73
End: 2023-11-10

## 2023-11-10 VITALS — BODY MASS INDEX: 34.21 KG/M2 | HEIGHT: 69 IN | WEIGHT: 231 LBS

## 2023-11-10 DIAGNOSIS — I87.2 VENOUS INSUFFICIENCY (CHRONIC) (PERIPHERAL): ICD-10-CM

## 2023-11-10 DIAGNOSIS — M79.675 PAIN OF TOE OF LEFT FOOT: ICD-10-CM

## 2023-11-10 DIAGNOSIS — R26.2 DIFFICULTY WALKING: ICD-10-CM

## 2023-11-10 DIAGNOSIS — M79.674 PAIN OF TOE OF RIGHT FOOT: ICD-10-CM

## 2023-11-10 DIAGNOSIS — I73.9 PVD (PERIPHERAL VASCULAR DISEASE) (HCC): ICD-10-CM

## 2023-11-10 DIAGNOSIS — B35.1 ONYCHOMYCOSIS: Primary | ICD-10-CM

## 2023-11-10 NOTE — PROGRESS NOTES
11/10/23     Maria R Guzman    : 1950  Sex: male  Age: 75 y.o. Subjective: The patient is seen today for evaluation regarding foot evaluation and mycotic nail care. No other complaints noted. Chief Complaint   Patient presents with    Nail Problem     Nail care       Current Medications:    Current Outpatient Medications:     tamsulosin (FLOMAX) 0.4 MG capsule, Take 2 capsules by mouth daily With food, Disp: 180 capsule, Rfl: 1    DULoxetine (CYMBALTA) 20 MG extended release capsule, Take 2 capsules by mouth daily, Disp: 60 capsule, Rfl: 3    celecoxib (CELEBREX) 200 MG capsule, Take 1 capsule by mouth daily, Disp: 90 capsule, Rfl: 1    Red Yeast Rice 600 MG CAPS, Take by mouth, Disp: , Rfl:     Multiple Vitamins-Minerals (CENTRUM SILVER 50+MEN) TABS, Take by mouth, Disp: , Rfl:     ibuprofen (ADVIL;MOTRIN) 400 MG tablet, Take 1 tablet by mouth every 6 hours as needed for Pain (Patient not taking: Reported on 7/10/2023), Disp: , Rfl:     Allergies: Allergies   Allergen Reactions    Atorvastatin Calcium Other (See Comments)     Leg cramps      Ezetimibe-Simvastatin Other (See Comments)     Leg pain/cramping       No past surgical history on file. No past medical history on file. Vitals:    11/10/23 1044   Weight: 104.8 kg (231 lb)   Height: 1.753 m (5' 9.02\")       Exam:  Pedal pulses diminished to palpation bilateral foot. At this time the nail/s 1, 2, 5 right foot and nail/s 1, 2, 5 left foot are noted to be thickened, dystrophic and discolored with subungual debris present. Tenderness noted to palpation. Diminished hair growth is noted to both lower extremities. Edema noted with both varicosities and stasis skin changes present bilaterally. Coolness is noted to the digital regions to palpation. Capillary fill time delayed digital areas bilateral foot. No heel fissuring or macerations of the web spaces. No plantar calluses and/or ulcerative areas are noted.   Patient is having
Patient in today for nail care. Patient does not have any complaints of pain at this time.  Patient's PCP is Charlette Del Cid MD date of last ov 10/16/23          Sixto Hutson LPN
yes

## 2024-01-11 ENCOUNTER — OFFICE VISIT (OUTPATIENT)
Dept: FAMILY MEDICINE CLINIC | Age: 74
End: 2024-01-11

## 2024-01-11 VITALS
BODY MASS INDEX: 33.92 KG/M2 | SYSTOLIC BLOOD PRESSURE: 124 MMHG | HEIGHT: 69 IN | HEART RATE: 77 BPM | RESPIRATION RATE: 18 BRPM | WEIGHT: 229 LBS | OXYGEN SATURATION: 99 % | TEMPERATURE: 97.9 F | DIASTOLIC BLOOD PRESSURE: 76 MMHG

## 2024-01-11 DIAGNOSIS — N40.1 BENIGN PROSTATIC HYPERPLASIA WITH URINARY FREQUENCY: ICD-10-CM

## 2024-01-11 DIAGNOSIS — E78.49 OTHER HYPERLIPIDEMIA: ICD-10-CM

## 2024-01-11 DIAGNOSIS — M15.9 PRIMARY OSTEOARTHRITIS INVOLVING MULTIPLE JOINTS: Primary | ICD-10-CM

## 2024-01-11 DIAGNOSIS — F32.0 CURRENT MILD EPISODE OF MAJOR DEPRESSIVE DISORDER, UNSPECIFIED WHETHER RECURRENT (HCC): ICD-10-CM

## 2024-01-11 DIAGNOSIS — R35.0 BENIGN PROSTATIC HYPERPLASIA WITH URINARY FREQUENCY: ICD-10-CM

## 2024-01-11 RX ORDER — MIRTAZAPINE 15 MG/1
15 TABLET, FILM COATED ORAL NIGHTLY
Qty: 30 TABLET | Refills: 5 | Status: SHIPPED | OUTPATIENT
Start: 2024-01-11

## 2024-01-11 RX ORDER — SENNOSIDES A AND B 8.6 MG/1
1 TABLET, FILM COATED ORAL 2 TIMES DAILY
Qty: 60 TABLET | Refills: 11 | Status: SHIPPED | OUTPATIENT
Start: 2024-01-11 | End: 2025-01-10

## 2024-01-11 RX ORDER — DULOXETIN HYDROCHLORIDE 60 MG/1
60 CAPSULE, DELAYED RELEASE ORAL DAILY
Qty: 30 CAPSULE | Refills: 5 | Status: SHIPPED | OUTPATIENT
Start: 2024-01-11

## 2024-01-11 ASSESSMENT — PATIENT HEALTH QUESTIONNAIRE - PHQ9
SUM OF ALL RESPONSES TO PHQ QUESTIONS 1-9: 9
10. IF YOU CHECKED OFF ANY PROBLEMS, HOW DIFFICULT HAVE THESE PROBLEMS MADE IT FOR YOU TO DO YOUR WORK, TAKE CARE OF THINGS AT HOME, OR GET ALONG WITH OTHER PEOPLE: 0
SUM OF ALL RESPONSES TO PHQ QUESTIONS 1-9: 9
SUM OF ALL RESPONSES TO PHQ9 QUESTIONS 1 & 2: 4
6. FEELING BAD ABOUT YOURSELF - OR THAT YOU ARE A FAILURE OR HAVE LET YOURSELF OR YOUR FAMILY DOWN: 0
8. MOVING OR SPEAKING SO SLOWLY THAT OTHER PEOPLE COULD HAVE NOTICED. OR THE OPPOSITE, BEING SO FIGETY OR RESTLESS THAT YOU HAVE BEEN MOVING AROUND A LOT MORE THAN USUAL: 0
4. FEELING TIRED OR HAVING LITTLE ENERGY: 2
5. POOR APPETITE OR OVEREATING: 0
1. LITTLE INTEREST OR PLEASURE IN DOING THINGS: 2
SUM OF ALL RESPONSES TO PHQ QUESTIONS 1-9: 9
2. FEELING DOWN, DEPRESSED OR HOPELESS: 2
3. TROUBLE FALLING OR STAYING ASLEEP: 3
SUM OF ALL RESPONSES TO PHQ QUESTIONS 1-9: 9
7. TROUBLE CONCENTRATING ON THINGS, SUCH AS READING THE NEWSPAPER OR WATCHING TELEVISION: 0
9. THOUGHTS THAT YOU WOULD BE BETTER OFF DEAD, OR OF HURTING YOURSELF: 0

## 2024-01-11 ASSESSMENT — ENCOUNTER SYMPTOMS
CONSTIPATION: 0
COUGH: 0
SORE THROAT: 0
BACK PAIN: 1
SINUS PAIN: 0
ABDOMINAL PAIN: 0
DIARRHEA: 0
WHEEZING: 0
BLOOD IN STOOL: 0
RHINORRHEA: 1
PHOTOPHOBIA: 0
EYE REDNESS: 0
SHORTNESS OF BREATH: 0
TROUBLE SWALLOWING: 0
VOMITING: 0

## 2024-01-11 NOTE — PROGRESS NOTES
OFFICE NOTE    24  Name: Josse Marquez  :1950   Sex:male   Age:73 y.o.      SUBJECTIVE  Chief Complaint   Patient presents with    Discuss Medications    Sleep Problem    Depression       HPI   Comes in has remained on Cymbalta not feeling much better. Feels wife doesn't want to help herself, just becomes more sedentary and dependent on him.   Review of Systems   Constitutional:  Positive for activity change and fatigue. Negative for appetite change, fever and unexpected weight change.   HENT:  Positive for congestion and rhinorrhea. Negative for ear pain, hearing loss, sinus pain, sore throat and trouble swallowing.    Eyes:  Negative for photophobia, redness and visual disturbance.   Respiratory:  Negative for cough, shortness of breath and wheezing.    Cardiovascular:  Negative for chest pain, palpitations and leg swelling.   Gastrointestinal:  Negative for abdominal pain, blood in stool, constipation, diarrhea and vomiting.   Endocrine: Negative for cold intolerance, polydipsia and polyuria.   Genitourinary:  Positive for frequency and urgency. Negative for difficulty urinating, dysuria, genital sores and hematuria.   Musculoskeletal:  Positive for arthralgias and back pain. Negative for joint swelling.   Skin:  Negative for pallor and rash.   Allergic/Immunologic: Negative for food allergies.   Neurological:  Negative for dizziness, tremors, seizures, syncope, weakness, numbness and headaches.   Hematological:  Negative for adenopathy. Does not bruise/bleed easily.   Psychiatric/Behavioral:  Positive for dysphoric mood and sleep disturbance. Negative for agitation and hallucinations. The patient is nervous/anxious.    All other systems reviewed and are negative.           Current Outpatient Medications:     DULoxetine (CYMBALTA) 60 MG extended release capsule, Take 1 capsule by mouth daily, Disp: 30 capsule, Rfl: 5    mirtazapine (REMERON) 15 MG tablet, Take 1 tablet by mouth nightly, Disp: 30

## 2024-01-12 ENCOUNTER — PROCEDURE VISIT (OUTPATIENT)
Dept: PODIATRY | Age: 74
End: 2024-01-12
Payer: MEDICARE

## 2024-01-12 VITALS — HEIGHT: 69 IN | WEIGHT: 229 LBS | BODY MASS INDEX: 33.92 KG/M2

## 2024-01-12 DIAGNOSIS — B35.1 ONYCHOMYCOSIS: Primary | ICD-10-CM

## 2024-01-12 DIAGNOSIS — R26.2 DIFFICULTY WALKING: ICD-10-CM

## 2024-01-12 DIAGNOSIS — I87.2 VENOUS INSUFFICIENCY (CHRONIC) (PERIPHERAL): ICD-10-CM

## 2024-01-12 DIAGNOSIS — M79.675 PAIN OF TOE OF LEFT FOOT: ICD-10-CM

## 2024-01-12 DIAGNOSIS — I73.9 PVD (PERIPHERAL VASCULAR DISEASE) (HCC): ICD-10-CM

## 2024-01-12 DIAGNOSIS — M79.674 PAIN OF TOE OF RIGHT FOOT: ICD-10-CM

## 2024-01-12 PROCEDURE — 11721 DEBRIDE NAIL 6 OR MORE: CPT | Performed by: PODIATRIST

## 2024-01-12 PROCEDURE — 99999 PR OFFICE/OUTPT VISIT,PROCEDURE ONLY: CPT | Performed by: PODIATRIST

## 2024-01-12 NOTE — PROGRESS NOTES
Patient in today for nail care. Patient does not have any complaints of pain at this time. Patient's PCP is Osmani Fitzpatrick MD date of last ov 1/11/24          Gisella Che LPN

## 2024-01-12 NOTE — PROGRESS NOTES
24     Josse Marquez    : 1950  Sex: male  Age: 73 y.o.    Subjective:  The patient is seen today for evaluation regarding foot evaluation and mycotic nail care.  No other complaints noted.    Chief Complaint   Patient presents with    Nail Problem     Nail care       Current Medications:    Current Outpatient Medications:     DULoxetine (CYMBALTA) 60 MG extended release capsule, Take 1 capsule by mouth daily, Disp: 30 capsule, Rfl: 5    mirtazapine (REMERON) 15 MG tablet, Take 1 tablet by mouth nightly, Disp: 30 tablet, Rfl: 5    senna (SENOKOT) 8.6 MG tablet, Take 1 tablet by mouth 2 times daily, Disp: 60 tablet, Rfl: 11    tamsulosin (FLOMAX) 0.4 MG capsule, Take 2 capsules by mouth daily With food, Disp: 180 capsule, Rfl: 1    celecoxib (CELEBREX) 200 MG capsule, Take 1 capsule by mouth daily, Disp: 90 capsule, Rfl: 1    Red Yeast Rice 600 MG CAPS, Take by mouth, Disp: , Rfl:     Multiple Vitamins-Minerals (CENTRUM SILVER 50+MEN) TABS, Take by mouth, Disp: , Rfl:     Allergies:  Allergies   Allergen Reactions    Atorvastatin Calcium Other (See Comments)     Leg cramps      Ezetimibe-Simvastatin Other (See Comments)     Leg pain/cramping       No past surgical history on file.  No past medical history on file.    Vitals:    24 1044   Weight: 103.9 kg (229 lb)   Height: 1.753 m (5' 9.02\")       Exam:  Pedal pulses diminished to palpation bilateral foot.  At this time the nail/s 1, 2, 5 right foot and nail/s 1, 2, 5 left foot are noted to be thickened, dystrophic and discolored with subungual debris present.  Tenderness noted to palpation.  Diminished hair growth is noted to both lower extremities.  Edema noted with both varicosities and stasis skin changes present bilaterally.  Coolness is noted to the digital regions to palpation.  Capillary fill time delayed digital areas bilateral foot.  No heel fissuring or macerations of the web spaces.  No plantar calluses and/or ulcerative areas are

## 2024-03-15 ENCOUNTER — PROCEDURE VISIT (OUTPATIENT)
Dept: PODIATRY | Age: 74
End: 2024-03-15
Payer: MEDICARE

## 2024-03-15 VITALS — BODY MASS INDEX: 33.92 KG/M2 | HEIGHT: 69 IN | WEIGHT: 229 LBS

## 2024-03-15 DIAGNOSIS — M79.674 PAIN OF TOE OF RIGHT FOOT: ICD-10-CM

## 2024-03-15 DIAGNOSIS — I73.9 PVD (PERIPHERAL VASCULAR DISEASE) (HCC): ICD-10-CM

## 2024-03-15 DIAGNOSIS — M79.675 PAIN OF TOE OF LEFT FOOT: ICD-10-CM

## 2024-03-15 DIAGNOSIS — R26.2 DIFFICULTY WALKING: ICD-10-CM

## 2024-03-15 DIAGNOSIS — I87.2 VENOUS INSUFFICIENCY (CHRONIC) (PERIPHERAL): ICD-10-CM

## 2024-03-15 DIAGNOSIS — B35.1 ONYCHOMYCOSIS: Primary | ICD-10-CM

## 2024-03-15 PROCEDURE — 11721 DEBRIDE NAIL 6 OR MORE: CPT | Performed by: PODIATRIST

## 2024-03-15 PROCEDURE — 99999 PR OFFICE/OUTPT VISIT,PROCEDURE ONLY: CPT | Performed by: PODIATRIST

## 2024-03-15 NOTE — PROGRESS NOTES
3/15/24     Josse Marquez    : 1950  Sex: male  Age: 73 y.o.    Subjective:  The patient is seen today for evaluation regarding foot evaluation and mycotic nail care.  No other complaints noted.    Chief Complaint   Patient presents with    Nail Problem     Nail care       Current Medications:    Current Outpatient Medications:     mirtazapine (REMERON) 15 MG tablet, Take 1 tablet by mouth nightly, Disp: 30 tablet, Rfl: 5    senna (SENOKOT) 8.6 MG tablet, Take 1 tablet by mouth 2 times daily, Disp: 60 tablet, Rfl: 11    tamsulosin (FLOMAX) 0.4 MG capsule, Take 2 capsules by mouth daily With food, Disp: 180 capsule, Rfl: 1    celecoxib (CELEBREX) 200 MG capsule, Take 1 capsule by mouth daily, Disp: 90 capsule, Rfl: 1    Red Yeast Rice 600 MG CAPS, Take by mouth, Disp: , Rfl:     Multiple Vitamins-Minerals (CENTRUM SILVER 50+MEN) TABS, Take by mouth, Disp: , Rfl:     DULoxetine (CYMBALTA) 60 MG extended release capsule, Take 1 capsule by mouth daily (Patient not taking: Reported on 3/15/2024), Disp: 30 capsule, Rfl: 5    Allergies:  Allergies   Allergen Reactions    Atorvastatin Calcium Other (See Comments)     Leg cramps      Ezetimibe-Simvastatin Other (See Comments)     Leg pain/cramping       No past surgical history on file.  No past medical history on file.    Vitals:    03/15/24 1033   Weight: 103.9 kg (229 lb)   Height: 1.753 m (5' 9\")       Exam:  Pedal pulses diminished to palpation bilateral foot.  At this time the nail/s 1, 2, 5 right foot and nail/s 1, 2, 5 left foot are noted to be thickened, dystrophic and discolored with subungual debris present.  Tenderness noted to palpation.  Diminished hair growth is noted to both lower extremities.  Edema noted with both varicosities and stasis skin changes present bilaterally.  Coolness is noted to the digital regions to palpation.  Capillary fill time delayed digital areas bilateral foot.  No heel fissuring or macerations of the web spaces.  No

## 2024-03-15 NOTE — PROGRESS NOTES
Patient here for 2 month nail care. Osmani Fitzpatrick MD last visit 1/11/2024  Electronically signed by Marina Villanueva LPN on 3/15/2024 at 10:35 AM       mammogram

## 2024-05-15 DIAGNOSIS — R35.0 BENIGN PROSTATIC HYPERPLASIA WITH URINARY FREQUENCY: ICD-10-CM

## 2024-05-15 DIAGNOSIS — N40.1 BENIGN PROSTATIC HYPERPLASIA WITH URINARY FREQUENCY: ICD-10-CM

## 2024-05-15 NOTE — TELEPHONE ENCOUNTER
Last Appointment:  1/11/2024  Future Appointments   Date Time Provider Department Center   6/13/2024  2:45 PM Osmani Fitzpatrick MD [facility]

## 2024-05-16 RX ORDER — TAMSULOSIN HYDROCHLORIDE 0.4 MG/1
0.8 CAPSULE ORAL DAILY
Qty: 180 CAPSULE | Refills: 1 | Status: SHIPPED | OUTPATIENT
Start: 2024-05-16

## 2024-06-05 ENCOUNTER — TELEPHONE (OUTPATIENT)
Dept: FAMILY MEDICINE CLINIC | Age: 74
End: 2024-06-05

## 2024-06-05 NOTE — TELEPHONE ENCOUNTER
Josse is asking if you can order labs on him prior to his appointment with you next week?      He wants to go to the lab soon because he is not sleeping well, feels tired all the time, and has occasional stomach pain.

## 2024-06-05 NOTE — TELEPHONE ENCOUNTER
We ordered full labs on him in October, it might be better if we saw him and then focused on what might be needed

## 2024-06-06 ENCOUNTER — OFFICE VISIT (OUTPATIENT)
Dept: FAMILY MEDICINE CLINIC | Age: 74
End: 2024-06-06

## 2024-06-06 VITALS
TEMPERATURE: 98.7 F | HEIGHT: 69 IN | SYSTOLIC BLOOD PRESSURE: 124 MMHG | OXYGEN SATURATION: 97 % | BODY MASS INDEX: 34.07 KG/M2 | WEIGHT: 230 LBS | RESPIRATION RATE: 18 BRPM | HEART RATE: 78 BPM | DIASTOLIC BLOOD PRESSURE: 82 MMHG

## 2024-06-06 DIAGNOSIS — F32.9 DEPRESSION, REACTIVE: ICD-10-CM

## 2024-06-06 DIAGNOSIS — M19.021 PRIMARY OSTEOARTHRITIS OF BOTH ELBOWS: ICD-10-CM

## 2024-06-06 DIAGNOSIS — N52.9 VASCULOGENIC ERECTILE DYSFUNCTION, UNSPECIFIED VASCULOGENIC ERECTILE DYSFUNCTION TYPE: ICD-10-CM

## 2024-06-06 DIAGNOSIS — Z12.11 SCREEN FOR COLON CANCER: ICD-10-CM

## 2024-06-06 DIAGNOSIS — F32.9 DEPRESSION, REACTIVE: Primary | ICD-10-CM

## 2024-06-06 DIAGNOSIS — M19.022 PRIMARY OSTEOARTHRITIS OF BOTH ELBOWS: ICD-10-CM

## 2024-06-06 LAB
ALBUMIN: 4.6 G/DL (ref 3.5–5.2)
ALP BLD-CCNC: 77 U/L (ref 40–129)
ALT SERPL-CCNC: 11 U/L (ref 0–40)
ANION GAP SERPL CALCULATED.3IONS-SCNC: 11 MMOL/L (ref 7–16)
AST SERPL-CCNC: 17 U/L (ref 0–39)
BASOPHILS ABSOLUTE: 0.03 K/UL (ref 0–0.2)
BASOPHILS RELATIVE PERCENT: 1 % (ref 0–2)
BILIRUB SERPL-MCNC: 0.4 MG/DL (ref 0–1.2)
BUN BLDV-MCNC: 19 MG/DL (ref 6–23)
CALCIUM SERPL-MCNC: 9.3 MG/DL (ref 8.6–10.2)
CHLORIDE BLD-SCNC: 103 MMOL/L (ref 98–107)
CO2: 27 MMOL/L (ref 22–29)
CREAT SERPL-MCNC: 0.7 MG/DL (ref 0.7–1.2)
EOSINOPHILS ABSOLUTE: 0.07 K/UL (ref 0.05–0.5)
EOSINOPHILS RELATIVE PERCENT: 1 % (ref 0–6)
GFR, ESTIMATED: >90 ML/MIN/1.73M2
GLUCOSE BLD-MCNC: 97 MG/DL (ref 74–99)
HCT VFR BLD CALC: 43.6 % (ref 37–54)
HEMOGLOBIN: 14.8 G/DL (ref 12.5–16.5)
IMMATURE GRANULOCYTES %: 0 % (ref 0–5)
IMMATURE GRANULOCYTES ABSOLUTE: <0.03 K/UL (ref 0–0.58)
LYMPHOCYTES ABSOLUTE: 1.68 K/UL (ref 1.5–4)
LYMPHOCYTES RELATIVE PERCENT: 28 % (ref 20–42)
MCH RBC QN AUTO: 30.8 PG (ref 26–35)
MCHC RBC AUTO-ENTMCNC: 33.9 G/DL (ref 32–34.5)
MCV RBC AUTO: 90.6 FL (ref 80–99.9)
MONOCYTES ABSOLUTE: 0.38 K/UL (ref 0.1–0.95)
MONOCYTES RELATIVE PERCENT: 6 % (ref 2–12)
NEUTROPHILS ABSOLUTE: 3.8 K/UL (ref 1.8–7.3)
NEUTROPHILS RELATIVE PERCENT: 64 % (ref 43–80)
PDW BLD-RTO: 12.5 % (ref 11.5–15)
PLATELET # BLD: 245 K/UL (ref 130–450)
PMV BLD AUTO: 9.7 FL (ref 7–12)
POTASSIUM SERPL-SCNC: 4.4 MMOL/L (ref 3.5–5)
RBC # BLD: 4.81 M/UL (ref 3.8–5.8)
SODIUM BLD-SCNC: 141 MMOL/L (ref 132–146)
TOTAL PROTEIN: 7.6 G/DL (ref 6.4–8.3)
WBC # BLD: 6 K/UL (ref 4.5–11.5)

## 2024-06-06 RX ORDER — CELECOXIB 200 MG/1
200 CAPSULE ORAL DAILY
Qty: 90 CAPSULE | Refills: 1 | Status: SHIPPED
Start: 2024-06-06 | End: 2024-06-06

## 2024-06-06 RX ORDER — CELECOXIB 200 MG/1
200 CAPSULE ORAL DAILY
Qty: 90 CAPSULE | Refills: 1 | Status: SHIPPED | OUTPATIENT
Start: 2024-06-06

## 2024-06-06 RX ORDER — TIZANIDINE 4 MG/1
4 TABLET ORAL NIGHTLY
COMMUNITY
Start: 2023-09-08

## 2024-06-06 NOTE — PROGRESS NOTES
OFFICE NOTE    24  Name: Josse Marquez  :1950   Sex:male   Age:73 y.o.      SUBJECTIVE  Chief Complaint   Patient presents with    Abdominal Pain     Causes nausea     Fatigue    Urinary Frequency    Stress       HPI Comes in with wife. Mirtazapine caused \"hallucinations\" which were actually vivid dreams so he stopped. Decided he would stop Duloxetine at same time. Mulitiple antidepressants multiple times always same result, patient does not believe they help him    Review of Systems   Anhedonia, sleep disturbances. Appetite disturbances, somatic complaints. Wife increasingly disabled from knee pain which she does not want to pursue getting fixed and this frustrates Josse.      Current Outpatient Medications:     tiZANidine (ZANAFLEX) 4 MG tablet, Take 1 tablet by mouth at bedtime, Disp: , Rfl:     celecoxib (CELEBREX) 200 MG capsule, Take 1 capsule by mouth daily, Disp: 90 capsule, Rfl: 1    tamsulosin (FLOMAX) 0.4 MG capsule, Take 2 capsules by mouth daily With food, Disp: 180 capsule, Rfl: 1    Red Yeast Rice 600 MG CAPS, Take by mouth, Disp: , Rfl:     Multiple Vitamins-Minerals (CENTRUM SILVER 50+MEN) TABS, Take by mouth, Disp: , Rfl:     DULoxetine (CYMBALTA) 60 MG extended release capsule, Take 1 capsule by mouth daily (Patient not taking: Reported on 3/15/2024), Disp: 30 capsule, Rfl: 5    mirtazapine (REMERON) 15 MG tablet, Take 1 tablet by mouth nightly (Patient not taking: Reported on 2024), Disp: 30 tablet, Rfl: 5    senna (SENOKOT) 8.6 MG tablet, Take 1 tablet by mouth 2 times daily (Patient not taking: Reported on 2024), Disp: 60 tablet, Rfl: 11  Allergies   Allergen Reactions    Atorvastatin Calcium Other (See Comments)     Leg cramps      Ezetimibe-Simvastatin Other (See Comments)     Leg pain/cramping       No past medical history on file.  No past surgical history on file.  No family history on file.  Social History       Tobacco History       Smoking Status  Some Days

## 2024-06-06 NOTE — TELEPHONE ENCOUNTER
Patient returned call and was informed. There was a cancellation on Dr Fitzpatirck's schedule for today that he opted to take so labs cold be ordered sooner. Will hold off on cancelling appt on 6/13 until patient is seen in office.

## 2024-06-07 ENCOUNTER — TELEPHONE (OUTPATIENT)
Age: 74
End: 2024-06-07

## 2024-06-07 LAB — SED RATE, AUTOMATED: 12 MM/HR (ref 0–15)

## 2024-06-07 NOTE — TELEPHONE ENCOUNTER
[facility] received referral for pt needing assessment of LOC to determine appropriate mental health treatment. Per chart review, pt has trialed many antidepressants without success and is continuing to have concerns. Call placed to pt today and message left requesting return call at his convenience. Che Avalos, MSW, LISW-S

## 2024-06-08 LAB
SEX HORMONE BINDING GLOBULIN: 63 NMOL/L (ref 19–76)
TESTOSTERONE FREE-NONMALE: 44.6 PG/ML (ref 47–244)
TESTOSTERONE TOTAL: 348 NG/DL (ref 193–740)
TESTOSTERONE, BIOAVAILABLE: 104.5 NG/DL (ref 130–680)

## 2024-06-11 ENCOUNTER — TELEPHONE (OUTPATIENT)
Dept: FAMILY MEDICINE CLINIC | Age: 74
End: 2024-06-11

## 2024-06-11 NOTE — TELEPHONE ENCOUNTER
Maty calling in requesting message from recent lab results.  Maty informed.  Maty stated patient has an appointment with Dr. Fitzpatrick on 6/13 and will discuss further re: urology referral and testosterone results with Dr. Fitzpatrick.

## 2024-06-13 ENCOUNTER — OFFICE VISIT (OUTPATIENT)
Dept: FAMILY MEDICINE CLINIC | Age: 74
End: 2024-06-13

## 2024-06-13 VITALS
BODY MASS INDEX: 34.27 KG/M2 | RESPIRATION RATE: 18 BRPM | HEIGHT: 69 IN | WEIGHT: 231.4 LBS | HEART RATE: 77 BPM | SYSTOLIC BLOOD PRESSURE: 122 MMHG | TEMPERATURE: 97.1 F | DIASTOLIC BLOOD PRESSURE: 80 MMHG | OXYGEN SATURATION: 99 %

## 2024-06-13 DIAGNOSIS — E29.1 HYPOGONADISM IN MALE: ICD-10-CM

## 2024-06-13 DIAGNOSIS — M15.9 PRIMARY OSTEOARTHRITIS INVOLVING MULTIPLE JOINTS: ICD-10-CM

## 2024-06-13 DIAGNOSIS — Z00.00 INITIAL MEDICARE ANNUAL WELLNESS VISIT: Primary | ICD-10-CM

## 2024-06-13 RX ORDER — TIZANIDINE 4 MG/1
4 TABLET ORAL NIGHTLY
Qty: 90 TABLET | Refills: 1 | Status: SHIPPED | OUTPATIENT
Start: 2024-06-13

## 2024-06-13 ASSESSMENT — PATIENT HEALTH QUESTIONNAIRE - PHQ9
2. FEELING DOWN, DEPRESSED OR HOPELESS: NEARLY EVERY DAY
3. TROUBLE FALLING OR STAYING ASLEEP: NEARLY EVERY DAY
SUM OF ALL RESPONSES TO PHQ QUESTIONS 1-9: 15
7. TROUBLE CONCENTRATING ON THINGS, SUCH AS READING THE NEWSPAPER OR WATCHING TELEVISION: SEVERAL DAYS
4. FEELING TIRED OR HAVING LITTLE ENERGY: NEARLY EVERY DAY
1. LITTLE INTEREST OR PLEASURE IN DOING THINGS: NEARLY EVERY DAY
SUM OF ALL RESPONSES TO PHQ QUESTIONS 1-9: 15
9. THOUGHTS THAT YOU WOULD BE BETTER OFF DEAD, OR OF HURTING YOURSELF: NOT AT ALL
SUM OF ALL RESPONSES TO PHQ QUESTIONS 1-9: 15
5. POOR APPETITE OR OVEREATING: MORE THAN HALF THE DAYS
6. FEELING BAD ABOUT YOURSELF - OR THAT YOU ARE A FAILURE OR HAVE LET YOURSELF OR YOUR FAMILY DOWN: NOT AT ALL
8. MOVING OR SPEAKING SO SLOWLY THAT OTHER PEOPLE COULD HAVE NOTICED. OR THE OPPOSITE, BEING SO FIGETY OR RESTLESS THAT YOU HAVE BEEN MOVING AROUND A LOT MORE THAN USUAL: NOT AT ALL
SUM OF ALL RESPONSES TO PHQ QUESTIONS 1-9: 15
10. IF YOU CHECKED OFF ANY PROBLEMS, HOW DIFFICULT HAVE THESE PROBLEMS MADE IT FOR YOU TO DO YOUR WORK, TAKE CARE OF THINGS AT HOME, OR GET ALONG WITH OTHER PEOPLE: NOT DIFFICULT AT ALL
SUM OF ALL RESPONSES TO PHQ9 QUESTIONS 1 & 2: 6

## 2024-06-13 ASSESSMENT — LIFESTYLE VARIABLES
HOW MANY STANDARD DRINKS CONTAINING ALCOHOL DO YOU HAVE ON A TYPICAL DAY: PATIENT DOES NOT DRINK
HOW OFTEN DO YOU HAVE A DRINK CONTAINING ALCOHOL: NEVER

## 2024-06-13 NOTE — PROGRESS NOTES
Medicare Annual Wellness Visit    Josse Marquez is here for Medicare AWV, Discuss Medications, and Lower Back Pain    Assessment & Plan   Initial Medicare annual wellness visit  Primary osteoarthritis involving multiple joints  Hypogonadism in male  -     External Referral To Urology  Recommendations for Preventive Services Due: see orders and patient instructions/AVS.  Recommended screening schedule for the next 5-10 years is provided to the patient in written form: see Patient Instructions/AVS.     Return for Medicare Annual Wellness Visit in 1 year.     Subjective   The following acute and/or chronic problems were also addressed today:  See OV note  Patient's complete Health Risk Assessment and screening values have been reviewed and are found in Flowsheets. The following problems were reviewed today and where indicated follow up appointments were made and/or referrals ordered.    Positive Risk Factor Screenings with Interventions:    Fall Risk:  Do you feel unsteady or are you worried about falling? : (!) yes  2 or more falls in past year?: no  Fall with injury in past year?: no     Interventions:    Reviewed medications, home hazards, visual acuity, and co-morbidities that can increase risk for falls. Uses cane sometimes. Goes golfing, but finds it difficult even with a cart.     Depression:  PHQ-2 Score: 6  PHQ-9 Total Score: 15    Interpretation:  5-9 mild   10-14 moderate   15-19 moderately severe   20-27 severe     Interventions:  Agree with this. We have tried multiple times to get patient to take antidepressants, he always stops short of the time where benefits would be likely to become apparent. Saw clinical therapist for a while but she is in semiretirement and doing only virtual visits. Consult [facility] intake supervisor. Have not connected yet, urged to try again          General HRA Questions:  Select all that apply: (!) New or Increased Fatigue, Social Isolation, Stress,

## 2024-06-13 NOTE — PATIENT INSTRUCTIONS
6/13/2024  Medicare offers a range of preventive health benefits. Some of the tests and screenings are paid in full while other may be subject to a deductible, co-insurance, and/or copay.    Some of these benefits include a comprehensive review of your medical history including lifestyle, illnesses that may run in your family, and various assessments and screenings as appropriate.    After reviewing your medical record and screening and assessments performed today your provider may have ordered immunizations, labs, imaging, and/or referrals for you.  A list of these orders (if applicable) as well as your Preventive Care list are included within your After Visit Summary for your review.    Other Preventive Recommendations:    A preventive eye exam performed by an eye specialist is recommended every 1-2 years to screen for glaucoma; cataracts, macular degeneration, and other eye disorders.  A preventive dental visit is recommended every 6 months.  Try to get at least 150 minutes of exercise per week or 10,000 steps per day on a pedometer .  Order or download the FREE \"Exercise & Physical Activity: Your Everyday Guide\" from [employer] [place] [employer]. Call 1-261.567.8056 or search [employer] [place] [employer] online.  You need 1166-3657 mg of calcium and 4065-7274 IU of vitamin D per day. It is possible to meet your calcium requirement with diet alone, but a vitamin D supplement is usually necessary to meet this goal.  When exposed to the sun, use a sunscreen that protects against both UVA and UVB radiation with an SPF of 30 or greater. Reapply every 2 to 3 hours or after sweating, drying off with a towel, or swimming.  Always wear a seat belt when traveling in a car. Always wear a helmet when riding a bicycle or motorcycle.

## 2024-06-17 ENCOUNTER — TELEPHONE (OUTPATIENT)
Dept: FAMILY MEDICINE CLINIC | Age: 74
End: 2024-06-17

## 2024-06-17 ENCOUNTER — TELEPHONE (OUTPATIENT)
Age: 74
End: 2024-06-17

## 2024-06-17 LAB — NONINV COLON CA DNA+OCC BLD SCRN STL QL: NEGATIVE

## 2024-06-17 NOTE — TELEPHONE ENCOUNTER
Follow up call received from pt. Brief assessment completed to determine appropriate LOC. Pt was previously being seen at [facility], but found it to be unsuccessful. He has had many issues with medications, but unsure if he has tried tx for refractory depression (Spravato, TMS, etc). Pt also unsure if he would like counseling, but does want somewhere in [place] that would be closer to him. Discussed options and offered referral to [facility] in [place]. Pt will call to schedule and notify [facility] should he have any difficulties.Che Avalos, MSW, LISW-S

## 2024-06-17 NOTE — TELEPHONE ENCOUNTER
Pt calling in to follow up on urology referral.  We were awaiting PCP notes to be signed before sending referral.  Notes signed and Referral sent this AM.    Urololgy office said they will process the referral tomorrow and call pt then.  Pt advised.   Mauc Instructions: By selecting yes to the question below the MAUC number will be added into the note.  This will be calculated automatically based on the diagnosis chosen, the size entered, the body zone selected (H,M,L) and the specific indications you chose. You will also have the option to override the Mohs AUC if you disagree with the automatically calculated number and this option is found in the Case Summary tab.

## 2024-08-05 ENCOUNTER — PROCEDURE VISIT (OUTPATIENT)
Dept: PODIATRY | Age: 74
End: 2024-08-05
Payer: MEDICARE

## 2024-08-05 VITALS — WEIGHT: 231 LBS | HEIGHT: 69 IN | BODY MASS INDEX: 34.21 KG/M2

## 2024-08-05 DIAGNOSIS — I87.2 VENOUS INSUFFICIENCY (CHRONIC) (PERIPHERAL): ICD-10-CM

## 2024-08-05 DIAGNOSIS — B35.1 ONYCHOMYCOSIS: Primary | ICD-10-CM

## 2024-08-05 DIAGNOSIS — M79.675 PAIN OF TOE OF LEFT FOOT: ICD-10-CM

## 2024-08-05 DIAGNOSIS — R26.2 DIFFICULTY WALKING: ICD-10-CM

## 2024-08-05 DIAGNOSIS — M79.674 PAIN OF TOE OF RIGHT FOOT: ICD-10-CM

## 2024-08-05 DIAGNOSIS — I73.9 PVD (PERIPHERAL VASCULAR DISEASE) (HCC): ICD-10-CM

## 2024-08-05 PROCEDURE — 11721 DEBRIDE NAIL 6 OR MORE: CPT | Performed by: PODIATRIST

## 2024-08-05 PROCEDURE — 99999 PR OFFICE/OUTPT VISIT,PROCEDURE ONLY: CPT | Performed by: PODIATRIST

## 2024-08-05 NOTE — PROGRESS NOTES
24     Josse Marquez    : 1950  Sex: male  Age: 73 y.o.    Subjective:  The patient is seen today for evaluation regarding foot evaluation and mycotic nail care.  No other complaints noted.    Chief Complaint   Patient presents with    Nail Problem     Nail care       Current Medications:    Current Outpatient Medications:     tiZANidine (ZANAFLEX) 4 MG tablet, Take 1 tablet by mouth at bedtime, Disp: 90 tablet, Rfl: 1    celecoxib (CELEBREX) 200 MG capsule, Take 1 capsule by mouth daily, Disp: 90 capsule, Rfl: 1    tamsulosin (FLOMAX) 0.4 MG capsule, Take 2 capsules by mouth daily With food, Disp: 180 capsule, Rfl: 1    Red Yeast Rice 600 MG CAPS, Take by mouth, Disp: , Rfl:     Multiple Vitamins-Minerals (CENTRUM SILVER 50+MEN) TABS, Take by mouth, Disp: , Rfl:     Allergies:  Allergies   Allergen Reactions    Atorvastatin Calcium Other (See Comments)     Leg cramps      Ezetimibe-Simvastatin Other (See Comments)     Leg pain/cramping       No past surgical history on file.  No past medical history on file.    Vitals:    24 1259   Weight: 104.8 kg (231 lb)   Height: 1.753 m (5' 9.02\")       Exam:  Pedal pulses diminished to palpation bilateral foot.  At this time the nail/s 1, 2, 5 right foot and nail/s 1, 2, 5 left foot are noted to be thickened, dystrophic and discolored with subungual debris present.  Tenderness noted to palpation.  Diminished hair growth is noted to both lower extremities.  Edema noted with both varicosities and stasis skin changes present bilaterally.  Coolness is noted to the digital regions to palpation.  Capillary fill time delayed digital areas bilateral foot.  No heel fissuring or macerations of the web spaces.  No plantar calluses and/or ulcerative areas are noted.  Patient is having difficulty with gait/walking.             Plan Per Assessment  Josse was seen today for nail problem.    Diagnoses and all orders for this visit:    Onychomycosis    Pain of toe of

## 2024-08-05 NOTE — PROGRESS NOTES
Patient in today for nail care. Patient does not have any complaints of pain at this time. Patient's PCP is Osmani Fitzpatrick MD date of last ov 6/13/24              Gisella Che LPN

## 2024-09-23 ENCOUNTER — OFFICE VISIT (OUTPATIENT)
Dept: FAMILY MEDICINE CLINIC | Age: 74
End: 2024-09-23
Payer: MEDICARE

## 2024-09-23 VITALS
SYSTOLIC BLOOD PRESSURE: 120 MMHG | OXYGEN SATURATION: 98 % | DIASTOLIC BLOOD PRESSURE: 78 MMHG | BODY MASS INDEX: 33.33 KG/M2 | HEART RATE: 88 BPM | HEIGHT: 69 IN | TEMPERATURE: 97.1 F | WEIGHT: 225 LBS | RESPIRATION RATE: 18 BRPM

## 2024-09-23 DIAGNOSIS — N40.1 BENIGN PROSTATIC HYPERPLASIA WITH URINARY FREQUENCY: ICD-10-CM

## 2024-09-23 DIAGNOSIS — M19.021 PRIMARY OSTEOARTHRITIS OF BOTH ELBOWS: ICD-10-CM

## 2024-09-23 DIAGNOSIS — M54.42 ACUTE BILATERAL LOW BACK PAIN WITH LEFT-SIDED SCIATICA: Primary | ICD-10-CM

## 2024-09-23 DIAGNOSIS — M19.022 PRIMARY OSTEOARTHRITIS OF BOTH ELBOWS: ICD-10-CM

## 2024-09-23 DIAGNOSIS — R35.0 BENIGN PROSTATIC HYPERPLASIA WITH URINARY FREQUENCY: ICD-10-CM

## 2024-09-23 PROCEDURE — 1123F ACP DISCUSS/DSCN MKR DOCD: CPT | Performed by: FAMILY MEDICINE

## 2024-09-23 PROCEDURE — 99214 OFFICE O/P EST MOD 30 MIN: CPT | Performed by: FAMILY MEDICINE

## 2024-09-23 PROCEDURE — G8427 DOCREV CUR MEDS BY ELIG CLIN: HCPCS | Performed by: FAMILY MEDICINE

## 2024-09-23 PROCEDURE — 3017F COLORECTAL CA SCREEN DOC REV: CPT | Performed by: FAMILY MEDICINE

## 2024-09-23 PROCEDURE — 4004F PT TOBACCO SCREEN RCVD TLK: CPT | Performed by: FAMILY MEDICINE

## 2024-09-23 PROCEDURE — G8417 CALC BMI ABV UP PARAM F/U: HCPCS | Performed by: FAMILY MEDICINE

## 2024-09-23 RX ORDER — CELECOXIB 200 MG/1
200 CAPSULE ORAL DAILY
Qty: 90 CAPSULE | Refills: 1 | Status: SHIPPED | OUTPATIENT
Start: 2024-09-23

## 2024-09-23 RX ORDER — TAMSULOSIN HYDROCHLORIDE 0.4 MG/1
0.8 CAPSULE ORAL DAILY
Qty: 180 CAPSULE | Refills: 1 | Status: SHIPPED | OUTPATIENT
Start: 2024-09-23

## 2024-09-23 RX ORDER — METHYLPREDNISOLONE 4 MG
TABLET, DOSE PACK ORAL
Qty: 1 KIT | Refills: 0 | Status: SHIPPED | OUTPATIENT
Start: 2024-09-23 | End: 2024-09-29

## 2024-09-23 SDOH — ECONOMIC STABILITY: FOOD INSECURITY: WITHIN THE PAST 12 MONTHS, THE FOOD YOU BOUGHT JUST DIDN'T LAST AND YOU DIDN'T HAVE MONEY TO GET MORE.: NEVER TRUE

## 2024-09-23 SDOH — ECONOMIC STABILITY: INCOME INSECURITY: HOW HARD IS IT FOR YOU TO PAY FOR THE VERY BASICS LIKE FOOD, HOUSING, MEDICAL CARE, AND HEATING?: NOT HARD AT ALL

## 2024-09-23 SDOH — ECONOMIC STABILITY: FOOD INSECURITY: WITHIN THE PAST 12 MONTHS, YOU WORRIED THAT YOUR FOOD WOULD RUN OUT BEFORE YOU GOT MONEY TO BUY MORE.: NEVER TRUE

## 2024-09-23 ASSESSMENT — ENCOUNTER SYMPTOMS
ABDOMINAL PAIN: 0
COUGH: 0
BLOOD IN STOOL: 0
SHORTNESS OF BREATH: 0
COLOR CHANGE: 0
EYE REDNESS: 0
TROUBLE SWALLOWING: 0
BACK PAIN: 1
SORE THROAT: 0
PHOTOPHOBIA: 0
WHEEZING: 0

## 2024-09-30 ENCOUNTER — OFFICE VISIT (OUTPATIENT)
Dept: FAMILY MEDICINE CLINIC | Age: 74
End: 2024-09-30
Payer: MEDICARE

## 2024-09-30 VITALS
TEMPERATURE: 97.1 F | SYSTOLIC BLOOD PRESSURE: 152 MMHG | OXYGEN SATURATION: 98 % | HEIGHT: 69 IN | DIASTOLIC BLOOD PRESSURE: 82 MMHG | RESPIRATION RATE: 15 BRPM | WEIGHT: 225 LBS | BODY MASS INDEX: 33.33 KG/M2 | HEART RATE: 85 BPM

## 2024-09-30 DIAGNOSIS — R53.82 CHRONIC FATIGUE: ICD-10-CM

## 2024-09-30 DIAGNOSIS — R68.89 COLD SWEAT: Primary | ICD-10-CM

## 2024-09-30 DIAGNOSIS — R03.0 ELEVATED BLOOD PRESSURE READING: ICD-10-CM

## 2024-09-30 LAB
Lab: NORMAL
PERFORMING INSTRUMENT: NORMAL
QC PASS/FAIL: NORMAL
SARS-COV-2, POC: NORMAL

## 2024-09-30 PROCEDURE — 3017F COLORECTAL CA SCREEN DOC REV: CPT | Performed by: FAMILY MEDICINE

## 2024-09-30 PROCEDURE — 87426 SARSCOV CORONAVIRUS AG IA: CPT | Performed by: FAMILY MEDICINE

## 2024-09-30 PROCEDURE — G8417 CALC BMI ABV UP PARAM F/U: HCPCS | Performed by: FAMILY MEDICINE

## 2024-09-30 PROCEDURE — 1123F ACP DISCUSS/DSCN MKR DOCD: CPT | Performed by: FAMILY MEDICINE

## 2024-09-30 PROCEDURE — G8427 DOCREV CUR MEDS BY ELIG CLIN: HCPCS | Performed by: FAMILY MEDICINE

## 2024-09-30 PROCEDURE — 99214 OFFICE O/P EST MOD 30 MIN: CPT | Performed by: FAMILY MEDICINE

## 2024-09-30 PROCEDURE — 4004F PT TOBACCO SCREEN RCVD TLK: CPT | Performed by: FAMILY MEDICINE

## 2024-09-30 NOTE — PROGRESS NOTES
reading       COVID negative  Offered trial of Amlodipine - he declines  Offered labs and CXR for sweats, he refuses  Wants to see PCP in 2 weeks and discuss    KAMINI JARVIS MD

## 2024-10-02 ENCOUNTER — OFFICE VISIT (OUTPATIENT)
Dept: FAMILY MEDICINE CLINIC | Age: 74
End: 2024-10-02

## 2024-10-02 VITALS
TEMPERATURE: 97.2 F | DIASTOLIC BLOOD PRESSURE: 62 MMHG | BODY MASS INDEX: 33.33 KG/M2 | WEIGHT: 225 LBS | HEIGHT: 69 IN | HEART RATE: 85 BPM | SYSTOLIC BLOOD PRESSURE: 110 MMHG | OXYGEN SATURATION: 97 %

## 2024-10-02 DIAGNOSIS — F43.23 ADJUSTMENT DISORDER WITH MIXED ANXIETY AND DEPRESSED MOOD: Primary | ICD-10-CM

## 2024-10-02 NOTE — PROGRESS NOTES
OFFICE NOTE    10/2/24  Name: Josse Marquez  :1950   Sex:male   Age:74 y.o.      SUBJECTIVE  Chief Complaint   Patient presents with    Hypertension     Pt states d/c'd prozac 2 days ago- thought it was making BP elevated       HPI is seeing therapist as requested. Stopped Prozak    Review of Systems   No orthopnea, chest pain, VILLEGAS, syncope      Current Outpatient Medications:     celecoxib (CELEBREX) 200 MG capsule, Take 1 capsule by mouth daily, Disp: 90 capsule, Rfl: 1    tiZANidine (ZANAFLEX) 4 MG tablet, Take 1 tablet by mouth at bedtime, Disp: 90 tablet, Rfl: 1    tamsulosin (FLOMAX) 0.4 MG capsule, Take 2 capsules by mouth daily With food, Disp: 180 capsule, Rfl: 1    Red Yeast Rice 600 MG CAPS, Take by mouth, Disp: , Rfl:     Multiple Vitamins-Minerals (CENTRUM SILVER 50+MEN) TABS, Take by mouth, Disp: , Rfl:     FLUoxetine (PROZAC) 20 MG capsule, Take 1 capsule by mouth daily (Patient not taking: Reported on 10/2/2024), Disp: , Rfl:   Allergies   Allergen Reactions    Atorvastatin Calcium Other (See Comments)     Leg cramps      Ezetimibe-Simvastatin Other (See Comments)     Leg pain/cramping       No past medical history on file.  No past surgical history on file.  No family history on file.  Social History       Tobacco History       Smoking Status  Some Days Smoking Start Date   Smoking Tobacco Type  Cigars since       Passive Exposure  Never      Smokeless Tobacco Use  Never              Alcohol History       Alcohol Use Status  Not Currently              Drug Use       Drug Use Status  Never              Sexual Activity       Sexually Active  Yes Partners  Female                    OBJECTIVE  Vitals:    10/02/24 0943 10/02/24 0949   BP: 112/62 110/62   Pulse: 85    Temp: 97.2 °F (36.2 °C)    SpO2: 97%    Weight: 102.1 kg (225 lb)    Height: 1.753 m (5' 9\")         Body mass index is 33.23 kg/m².    No orders of the defined types were placed in this encounter.       EXAM   Physical

## 2024-10-07 ENCOUNTER — PROCEDURE VISIT (OUTPATIENT)
Dept: PODIATRY | Age: 74
End: 2024-10-07
Payer: MEDICARE

## 2024-10-07 VITALS — HEIGHT: 69 IN | WEIGHT: 225 LBS | BODY MASS INDEX: 33.33 KG/M2

## 2024-10-07 DIAGNOSIS — M79.674 PAIN OF TOE OF RIGHT FOOT: ICD-10-CM

## 2024-10-07 DIAGNOSIS — I87.2 VENOUS INSUFFICIENCY (CHRONIC) (PERIPHERAL): ICD-10-CM

## 2024-10-07 DIAGNOSIS — R26.2 DIFFICULTY WALKING: ICD-10-CM

## 2024-10-07 DIAGNOSIS — M79.675 PAIN OF TOE OF LEFT FOOT: ICD-10-CM

## 2024-10-07 DIAGNOSIS — I73.9 PVD (PERIPHERAL VASCULAR DISEASE) (HCC): ICD-10-CM

## 2024-10-07 DIAGNOSIS — B35.1 ONYCHOMYCOSIS: Primary | ICD-10-CM

## 2024-10-07 PROCEDURE — 11721 DEBRIDE NAIL 6 OR MORE: CPT | Performed by: PODIATRIST

## 2024-10-07 PROCEDURE — 99999 PR OFFICE/OUTPT VISIT,PROCEDURE ONLY: CPT | Performed by: PODIATRIST

## 2024-10-07 NOTE — PROGRESS NOTES
10/7/24     Josse Marquez    : 1950  Sex: male  Age: 74 y.o.    Subjective:  The patient is seen today for evaluation regarding foot evaluation and mycotic nail care.  No other complaints noted.    Chief Complaint   Patient presents with    Nail Problem     onychomycosis       Current Medications:    Current Outpatient Medications:     FLUoxetine (PROZAC) 20 MG capsule, Take 1 capsule by mouth daily, Disp: , Rfl:     celecoxib (CELEBREX) 200 MG capsule, Take 1 capsule by mouth daily, Disp: 90 capsule, Rfl: 1    tiZANidine (ZANAFLEX) 4 MG tablet, Take 1 tablet by mouth at bedtime, Disp: 90 tablet, Rfl: 1    tamsulosin (FLOMAX) 0.4 MG capsule, Take 2 capsules by mouth daily With food, Disp: 180 capsule, Rfl: 1    Red Yeast Rice 600 MG CAPS, Take by mouth, Disp: , Rfl:     Multiple Vitamins-Minerals (CENTRUM SILVER 50+MEN) TABS, Take by mouth, Disp: , Rfl:     Allergies:  Allergies   Allergen Reactions    Atorvastatin Calcium Other (See Comments)     Leg cramps      Ezetimibe-Simvastatin Other (See Comments)     Leg pain/cramping       No past surgical history on file.  No past medical history on file.    Vitals:    10/07/24 1301   Weight: 102.1 kg (225 lb)   Height: 1.753 m (5' 9\")       Exam:  Pedal pulses diminished to palpation bilateral foot.  At this time the nail/s 1, 2, 5 right foot and nail/s 1, 2, 5 left foot are noted to be thickened, dystrophic and discolored with subungual debris present.  Tenderness noted to palpation.  Diminished hair growth is noted to both lower extremities.  Edema noted with both varicosities and stasis skin changes present bilaterally.  Coolness is noted to the digital regions to palpation.  Capillary fill time delayed digital areas bilateral foot.  No heel fissuring or macerations of the web spaces.  No plantar calluses and/or ulcerative areas are noted.  Patient is having difficulty with gait/walking.             Plan Per Assessment  Josse was seen today for nail

## 2024-10-07 NOTE — PROGRESS NOTES
Patient here for nail care. Patient has no new concerns at this time. Osmani Fitzpatrick MD last visit 10/2/2024   Electronically signed by Marina Villanueva LPN on 10/7/2024 at 1:02 PM

## 2024-10-15 ENCOUNTER — OFFICE VISIT (OUTPATIENT)
Dept: CHIROPRACTIC MEDICINE | Age: 74
End: 2024-10-15
Payer: MEDICARE

## 2024-10-15 VITALS
HEIGHT: 69 IN | HEART RATE: 61 BPM | BODY MASS INDEX: 34.02 KG/M2 | DIASTOLIC BLOOD PRESSURE: 76 MMHG | WEIGHT: 229.72 LBS | SYSTOLIC BLOOD PRESSURE: 136 MMHG | TEMPERATURE: 97.7 F | OXYGEN SATURATION: 90 %

## 2024-10-15 DIAGNOSIS — M99.05 SEGMENTAL AND SOMATIC DYSFUNCTION OF PELVIC REGION: ICD-10-CM

## 2024-10-15 DIAGNOSIS — M54.42 ACUTE MIDLINE LOW BACK PAIN WITH LEFT-SIDED SCIATICA: ICD-10-CM

## 2024-10-15 DIAGNOSIS — M99.03 SEGMENTAL AND SOMATIC DYSFUNCTION OF LUMBAR REGION: Primary | ICD-10-CM

## 2024-10-15 DIAGNOSIS — M53.3 SACROCOCCYGEAL DISORDERS, NOT ELSEWHERE CLASSIFIED: ICD-10-CM

## 2024-10-15 PROCEDURE — 4004F PT TOBACCO SCREEN RCVD TLK: CPT | Performed by: CHIROPRACTOR

## 2024-10-15 PROCEDURE — 99203 OFFICE O/P NEW LOW 30 MIN: CPT | Performed by: CHIROPRACTOR

## 2024-10-15 PROCEDURE — 1123F ACP DISCUSS/DSCN MKR DOCD: CPT | Performed by: CHIROPRACTOR

## 2024-10-15 PROCEDURE — G8427 DOCREV CUR MEDS BY ELIG CLIN: HCPCS | Performed by: CHIROPRACTOR

## 2024-10-15 PROCEDURE — 3017F COLORECTAL CA SCREEN DOC REV: CPT | Performed by: CHIROPRACTOR

## 2024-10-15 PROCEDURE — G8484 FLU IMMUNIZE NO ADMIN: HCPCS | Performed by: CHIROPRACTOR

## 2024-10-15 PROCEDURE — 98940 CHIROPRACT MANJ 1-2 REGIONS: CPT | Performed by: CHIROPRACTOR

## 2024-10-15 PROCEDURE — G8417 CALC BMI ABV UP PARAM F/U: HCPCS | Performed by: CHIROPRACTOR

## 2024-10-15 ASSESSMENT — ENCOUNTER SYMPTOMS
BOWEL INCONTINENCE: 0
BACK PAIN: 1

## 2024-10-15 NOTE — PROGRESS NOTES
Patient is here for chronic lower back pain. No recent injury. Osmani Fitzpatrick MD  Electronically signed by Steph Fiore LPN on 10/15/2024 at 3:06 PM    
pain/cramping       No past medical history on file.  No family history on file.  No past surgical history on file.  Social History     Socioeconomic History    Marital status:      Spouse name: Not on file    Number of children: Not on file    Years of education: Not on file    Highest education level: Not on file   Occupational History    Not on file   Tobacco Use    Smoking status: Some Days     Types: Cigars     Start date: 1966     Passive exposure: Never    Smokeless tobacco: Never   Vaping Use    Vaping status: Never Used   Substance and Sexual Activity    Alcohol use: Not Currently    Drug use: Never    Sexual activity: Yes     Partners: Female   Other Topics Concern    Not on file   Social History Narrative    Not on file     Social Determinants of Health     Financial Resource Strain: Low Risk  (9/23/2024)    Overall Financial Resource Strain (CARDIA)     Difficulty of Paying Living Expenses: Not hard at all   Food Insecurity: No Food Insecurity (9/23/2024)    Hunger Vital Sign     Worried About Running Out of Food in the Last Year: Never true     Ran Out of Food in the Last Year: Never true   Transportation Needs: Unknown (9/23/2024)    PRAPARE - Transportation     Lack of Transportation (Medical): Not on file     Lack of Transportation (Non-Medical): No   Physical Activity: Inactive (6/13/2024)    Exercise Vital Sign     Days of Exercise per Week: 0 days     Minutes of Exercise per Session: 0 min   Stress: Not on file   Social Connections: Not on file   Intimate Partner Violence: Not on file   Housing Stability: Unknown (9/23/2024)    Housing Stability Vital Sign     Unable to Pay for Housing in the Last Year: Not on file     Number of Times Moved in the Last Year: Not on file     Homeless in the Last Year: No       Vitals:    10/15/24 1505   BP: 136/76   Pulse: 61   Temp: 97.7 °F (36.5 °C)   SpO2: 90%   Weight: 104.2 kg (229 lb 11.5 oz)   Height: 1.753 m (5' 9\")       EXAM:  Appearance: alert,

## 2024-10-22 ENCOUNTER — OFFICE VISIT (OUTPATIENT)
Dept: CHIROPRACTIC MEDICINE | Age: 74
End: 2024-10-22
Payer: MEDICARE

## 2024-10-22 VITALS
BODY MASS INDEX: 33.92 KG/M2 | HEIGHT: 69 IN | WEIGHT: 229 LBS | SYSTOLIC BLOOD PRESSURE: 124 MMHG | DIASTOLIC BLOOD PRESSURE: 72 MMHG

## 2024-10-22 DIAGNOSIS — M53.3 SACROCOCCYGEAL DISORDERS, NOT ELSEWHERE CLASSIFIED: ICD-10-CM

## 2024-10-22 DIAGNOSIS — M54.42 ACUTE MIDLINE LOW BACK PAIN WITH LEFT-SIDED SCIATICA: ICD-10-CM

## 2024-10-22 DIAGNOSIS — M99.03 SEGMENTAL AND SOMATIC DYSFUNCTION OF LUMBAR REGION: Primary | ICD-10-CM

## 2024-10-22 DIAGNOSIS — M99.05 SEGMENTAL AND SOMATIC DYSFUNCTION OF PELVIC REGION: ICD-10-CM

## 2024-10-22 PROCEDURE — 98940 CHIROPRACT MANJ 1-2 REGIONS: CPT | Performed by: CHIROPRACTOR

## 2024-10-22 NOTE — PROGRESS NOTES
10/22/24  Josse Marquez : 1950 Sex: male  Age: 74 y.o.    Chief Complaint   Patient presents with    Back Pain     Segmental and somatic dysfunction of lumbar region         HPI:   Pain has slightly improved. Back not bad, more in lower legs with walking.  Using heat, analgesic gels.  Celebrex helps too.  Patient denies new numbness, new weakness, new tingling      Current Outpatient Medications:     FLUoxetine (PROZAC) 20 MG capsule, Take 1 capsule by mouth daily, Disp: , Rfl:     celecoxib (CELEBREX) 200 MG capsule, Take 1 capsule by mouth daily, Disp: 90 capsule, Rfl: 1    tiZANidine (ZANAFLEX) 4 MG tablet, Take 1 tablet by mouth at bedtime, Disp: 90 tablet, Rfl: 1    tamsulosin (FLOMAX) 0.4 MG capsule, Take 2 capsules by mouth daily With food, Disp: 180 capsule, Rfl: 1    Red Yeast Rice 600 MG CAPS, Take by mouth, Disp: , Rfl:     Multiple Vitamins-Minerals (CENTRUM SILVER 50+MEN) TABS, Take by mouth, Disp: , Rfl:     Exam:   Vitals:    10/22/24 1104   BP: 124/72     Nontender in the midline, nontender SI joints today.  There are hypertonic and tender fibers noted with palpation in the paraspinal muscles of the lumbar region. Joint fixation is noted with motion screening at L4-S1, bilateral SI joints.    Josse was seen today for back pain.    Diagnoses and all orders for this visit:    Segmental and somatic dysfunction of lumbar region    Segmental and somatic dysfunction of pelvic region    Sacrococcygeal disorders, not elsewhere classified    Acute midline low back pain with left-sided sciatica        Treatment Plan: Continue with vibratory massage to the low back for 2 minutes followed by diversified manipulation to the affected lumbar, SI joints.  Tolerated well.    Symptoms suggestive of neurogenic claudication.  Likely has some stenosis in the lumbar region.  Continue with his conservative trial.  But if he fails to respond we will have his PCP get some imaging performed and proceed from

## 2024-10-30 ENCOUNTER — OFFICE VISIT (OUTPATIENT)
Dept: CHIROPRACTIC MEDICINE | Age: 74
End: 2024-10-30

## 2024-10-30 VITALS
OXYGEN SATURATION: 90 % | WEIGHT: 229 LBS | TEMPERATURE: 97.8 F | HEART RATE: 75 BPM | BODY MASS INDEX: 33.92 KG/M2 | HEIGHT: 69 IN

## 2024-10-30 DIAGNOSIS — M99.05 SEGMENTAL AND SOMATIC DYSFUNCTION OF PELVIC REGION: ICD-10-CM

## 2024-10-30 DIAGNOSIS — M53.3 SACROCOCCYGEAL DISORDERS, NOT ELSEWHERE CLASSIFIED: ICD-10-CM

## 2024-10-30 DIAGNOSIS — M99.03 SEGMENTAL AND SOMATIC DYSFUNCTION OF LUMBAR REGION: Primary | ICD-10-CM

## 2024-10-30 DIAGNOSIS — M54.42 ACUTE MIDLINE LOW BACK PAIN WITH LEFT-SIDED SCIATICA: ICD-10-CM

## 2024-10-30 NOTE — PROGRESS NOTES
Patient is here for follow up lower back pain. Patient states no new concerns. Osmani Fitzpatrick MD  Electronically signed by Steph Fiore LPN on 10/30/2024 at 1:06 PM

## 2024-10-30 NOTE — PROGRESS NOTES
10/30/24  Josse Marquez : 1950 Sex: male  Age: 74 y.o.    Chief Complaint   Patient presents with    Lower Back Pain       HPI:   Still with back pain, leg pains  Topical gels help, as does celebrex  Sitting he's fine.    Walking - bothers him, once he sits he's fine.   Can't golf due to back pain, walking intolerance.        Current Outpatient Medications:     FLUoxetine (PROZAC) 20 MG capsule, Take 1 capsule by mouth daily, Disp: , Rfl:     celecoxib (CELEBREX) 200 MG capsule, Take 1 capsule by mouth daily, Disp: 90 capsule, Rfl: 1    tiZANidine (ZANAFLEX) 4 MG tablet, Take 1 tablet by mouth at bedtime, Disp: 90 tablet, Rfl: 1    tamsulosin (FLOMAX) 0.4 MG capsule, Take 2 capsules by mouth daily With food, Disp: 180 capsule, Rfl: 1    Red Yeast Rice 600 MG CAPS, Take by mouth, Disp: , Rfl:     Multiple Vitamins-Minerals (CENTRUM SILVER 50+MEN) TABS, Take by mouth, Disp: , Rfl:     Exam:   Vitals:    10/30/24 1305   Pulse: 75   Temp: 97.8 °F (36.6 °C)   SpO2: 90%       There are hypertonic and tender fibers noted with palpation in the paraspinal muscles of the lumbar region. Joint fixation is noted with motion screening at L3-4, B/L SI jts.    Josse was seen today for lower back pain.    Diagnoses and all orders for this visit:    Segmental and somatic dysfunction of lumbar region    Segmental and somatic dysfunction of pelvic region    Sacrococcygeal disorders, not elsewhere classified    Acute midline low back pain with left-sided sciatica        Treatment Plan:  modified treatment today -- Solano flexion distraction manipulation to the L 3 segment using protocol 1 was performed today.    Then Diversified manipulation to the above-listed segments in the  SI jts .      His symptoms have failed to diminish despite care.  At this point I recommend updating some imaging on his lower back.  Chiropractic trial has not provided relief.  I did review an MRI report from several years ago.  He likely has lumbar

## 2024-11-04 ENCOUNTER — TELEPHONE (OUTPATIENT)
Dept: FAMILY MEDICINE CLINIC | Age: 74
End: 2024-11-04

## 2024-11-04 DIAGNOSIS — N40.1 BENIGN PROSTATIC HYPERPLASIA WITH WEAK URINARY STREAM: ICD-10-CM

## 2024-11-04 DIAGNOSIS — R39.12 BENIGN PROSTATIC HYPERPLASIA WITH WEAK URINARY STREAM: ICD-10-CM

## 2024-11-04 DIAGNOSIS — Z12.5 SCREENING FOR PROSTATE CANCER: Primary | ICD-10-CM

## 2024-11-04 NOTE — TELEPHONE ENCOUNTER
Wife calling saying patient needs a PSA and UA before seeing Dr Green. They want you to order these and fax results to Dr Green. His appt is on 11/11/24

## 2024-11-06 DIAGNOSIS — Z12.5 SCREENING FOR PROSTATE CANCER: ICD-10-CM

## 2024-11-06 DIAGNOSIS — R39.12 BENIGN PROSTATIC HYPERPLASIA WITH WEAK URINARY STREAM: ICD-10-CM

## 2024-11-06 DIAGNOSIS — N40.1 BENIGN PROSTATIC HYPERPLASIA WITH WEAK URINARY STREAM: ICD-10-CM

## 2024-11-06 LAB
BILIRUBIN, URINE: NEGATIVE
COLOR, UA: YELLOW
COMMENT: NORMAL
GLUCOSE URINE: NEGATIVE MG/DL
KETONES, URINE: NEGATIVE MG/DL
LEUKOCYTE ESTERASE, URINE: NEGATIVE
NITRITE, URINE: NEGATIVE
PH, URINE: 6 (ref 5–9)
PROSTATE SPECIFIC ANTIGEN: 4.15 NG/ML (ref 0–4)
PROTEIN UA: NEGATIVE MG/DL
SPECIFIC GRAVITY UA: 1.02 (ref 1–1.03)
TURBIDITY: CLEAR
URINE HGB: NEGATIVE
UROBILINOGEN, URINE: 0.2 EU/DL (ref 0–1)

## 2024-11-10 ENCOUNTER — TELEPHONE (OUTPATIENT)
Dept: FAMILY MEDICINE CLINIC | Age: 74
End: 2024-11-10

## 2024-11-19 ENCOUNTER — OFFICE VISIT (OUTPATIENT)
Dept: FAMILY MEDICINE CLINIC | Age: 74
End: 2024-11-19

## 2024-11-19 VITALS
OXYGEN SATURATION: 96 % | HEART RATE: 79 BPM | BODY MASS INDEX: 34.07 KG/M2 | WEIGHT: 230 LBS | DIASTOLIC BLOOD PRESSURE: 72 MMHG | HEIGHT: 69 IN | SYSTOLIC BLOOD PRESSURE: 130 MMHG | TEMPERATURE: 97.3 F

## 2024-11-19 DIAGNOSIS — M54.40 LOW BACK PAIN WITH SCIATICA, SCIATICA LATERALITY UNSPECIFIED, UNSPECIFIED BACK PAIN LATERALITY, UNSPECIFIED CHRONICITY: Primary | ICD-10-CM

## 2024-11-19 RX ORDER — METHYLPREDNISOLONE ACETATE 40 MG/ML
40 INJECTION, SUSPENSION INTRA-ARTICULAR; INTRALESIONAL; INTRAMUSCULAR; SOFT TISSUE ONCE
Status: COMPLETED | OUTPATIENT
Start: 2024-11-19 | End: 2024-11-19

## 2024-11-19 RX ADMIN — METHYLPREDNISOLONE ACETATE 40 MG: 40 INJECTION, SUSPENSION INTRA-ARTICULAR; INTRALESIONAL; INTRAMUSCULAR; SOFT TISSUE at 11:41

## 2024-11-19 RX ADMIN — Medication 5 ML: at 11:39

## 2024-12-09 ENCOUNTER — PROCEDURE VISIT (OUTPATIENT)
Dept: PODIATRY | Age: 74
End: 2024-12-09
Payer: MEDICARE

## 2024-12-09 VITALS
WEIGHT: 225 LBS | OXYGEN SATURATION: 99 % | SYSTOLIC BLOOD PRESSURE: 161 MMHG | TEMPERATURE: 97 F | DIASTOLIC BLOOD PRESSURE: 96 MMHG | HEIGHT: 69 IN | BODY MASS INDEX: 33.33 KG/M2 | HEART RATE: 76 BPM

## 2024-12-09 DIAGNOSIS — R26.2 DIFFICULTY WALKING: ICD-10-CM

## 2024-12-09 DIAGNOSIS — B35.1 ONYCHOMYCOSIS: Primary | ICD-10-CM

## 2024-12-09 DIAGNOSIS — I73.9 PVD (PERIPHERAL VASCULAR DISEASE) (HCC): ICD-10-CM

## 2024-12-09 DIAGNOSIS — M79.675 PAIN OF TOE OF LEFT FOOT: ICD-10-CM

## 2024-12-09 DIAGNOSIS — M79.674 PAIN OF TOE OF RIGHT FOOT: ICD-10-CM

## 2024-12-09 DIAGNOSIS — I87.2 VENOUS INSUFFICIENCY (CHRONIC) (PERIPHERAL): ICD-10-CM

## 2024-12-09 PROCEDURE — 11721 DEBRIDE NAIL 6 OR MORE: CPT | Performed by: PODIATRIST

## 2024-12-09 PROCEDURE — 99999 PR OFFICE/OUTPT VISIT,PROCEDURE ONLY: CPT | Performed by: PODIATRIST

## 2024-12-09 NOTE — PROGRESS NOTES
Patient in today for nail care. Patient does not have any complaints of pain at this time. Patient's PCP is Osmani Fitzpatrick MD date of last ov 11/19/24            Gisella Che LPN

## 2024-12-09 NOTE — PROGRESS NOTES
24     Josse Marquez    : 1950  Sex: male  Age: 74 y.o.    Subjective:  The patient is seen today for evaluation regarding foot evaluation and mycotic nail care.  No other complaints noted.    Chief Complaint   Patient presents with    Nail Problem     Nail care       Current Medications:    Current Outpatient Medications:     celecoxib (CELEBREX) 200 MG capsule, Take 1 capsule by mouth daily, Disp: 90 capsule, Rfl: 1    tiZANidine (ZANAFLEX) 4 MG tablet, Take 1 tablet by mouth at bedtime, Disp: 90 tablet, Rfl: 1    tamsulosin (FLOMAX) 0.4 MG capsule, Take 2 capsules by mouth daily With food, Disp: 180 capsule, Rfl: 1    Red Yeast Rice 600 MG CAPS, Take by mouth, Disp: , Rfl:     Multiple Vitamins-Minerals (CENTRUM SILVER 50+MEN) TABS, Take by mouth, Disp: , Rfl:     FLUoxetine (PROZAC) 20 MG capsule, Take 1 capsule by mouth daily (Patient not taking: Reported on 2024), Disp: , Rfl:     Allergies:  Allergies   Allergen Reactions    Atorvastatin Calcium Other (See Comments)     Leg cramps      Ezetimibe-Simvastatin Other (See Comments)     Leg pain/cramping       No past surgical history on file.  No past medical history on file.    Vitals:    24 1305   BP: (!) 161/96   Pulse: 76   Temp: 97 °F (36.1 °C)   SpO2: 99%   Weight: 102.1 kg (225 lb)   Height: 1.753 m (5' 9\")       Exam:  Pedal pulses diminished to palpation bilateral foot.  At this time the nail/s 1, 2, 5 right foot and nail/s 1, 2, 5 left foot are noted to be thickened, dystrophic and discolored with subungual debris present.  Tenderness noted to palpation.  Diminished hair growth is noted to both lower extremities.  Edema noted with both varicosities and stasis skin changes present bilateral.  Coolness is noted to the digital regions to palpation.  Capillary fill time delayed digital areas bilateral foot.  No heel fissuring or macerations of the web spaces.  No plantar calluses and/or ulcerative areas are noted.  Patient is

## 2025-02-03 ENCOUNTER — OFFICE VISIT (OUTPATIENT)
Dept: FAMILY MEDICINE CLINIC | Age: 75
End: 2025-02-03

## 2025-02-03 VITALS
WEIGHT: 226 LBS | RESPIRATION RATE: 17 BRPM | OXYGEN SATURATION: 96 % | DIASTOLIC BLOOD PRESSURE: 82 MMHG | HEART RATE: 75 BPM | SYSTOLIC BLOOD PRESSURE: 128 MMHG | TEMPERATURE: 97 F | HEIGHT: 69 IN | BODY MASS INDEX: 33.47 KG/M2

## 2025-02-03 DIAGNOSIS — M51.362 DEGENERATION OF INTERVERTEBRAL DISC OF LUMBAR REGION WITH DISCOGENIC BACK PAIN AND LOWER EXTREMITY PAIN: ICD-10-CM

## 2025-02-03 DIAGNOSIS — M19.021 PRIMARY OSTEOARTHRITIS OF BOTH ELBOWS: ICD-10-CM

## 2025-02-03 DIAGNOSIS — M19.022 PRIMARY OSTEOARTHRITIS OF BOTH ELBOWS: ICD-10-CM

## 2025-02-03 DIAGNOSIS — R35.0 BENIGN PROSTATIC HYPERPLASIA WITH URINARY FREQUENCY: ICD-10-CM

## 2025-02-03 DIAGNOSIS — N40.1 BENIGN PROSTATIC HYPERPLASIA WITH URINARY FREQUENCY: ICD-10-CM

## 2025-02-03 DIAGNOSIS — F32.9 REACTIVE DEPRESSION: Primary | ICD-10-CM

## 2025-02-03 RX ORDER — CELECOXIB 200 MG/1
200 CAPSULE ORAL DAILY
Qty: 90 CAPSULE | Refills: 1 | Status: SHIPPED | OUTPATIENT
Start: 2025-02-03

## 2025-02-03 SDOH — ECONOMIC STABILITY: FOOD INSECURITY: WITHIN THE PAST 12 MONTHS, YOU WORRIED THAT YOUR FOOD WOULD RUN OUT BEFORE YOU GOT MONEY TO BUY MORE.: NEVER TRUE

## 2025-02-03 SDOH — ECONOMIC STABILITY: FOOD INSECURITY: WITHIN THE PAST 12 MONTHS, THE FOOD YOU BOUGHT JUST DIDN'T LAST AND YOU DIDN'T HAVE MONEY TO GET MORE.: NEVER TRUE

## 2025-02-03 ASSESSMENT — PATIENT HEALTH QUESTIONNAIRE - PHQ9
4. FEELING TIRED OR HAVING LITTLE ENERGY: NEARLY EVERY DAY
SUM OF ALL RESPONSES TO PHQ QUESTIONS 1-9: 8
1. LITTLE INTEREST OR PLEASURE IN DOING THINGS: SEVERAL DAYS
7. TROUBLE CONCENTRATING ON THINGS, SUCH AS READING THE NEWSPAPER OR WATCHING TELEVISION: NOT AT ALL
6. FEELING BAD ABOUT YOURSELF - OR THAT YOU ARE A FAILURE OR HAVE LET YOURSELF OR YOUR FAMILY DOWN: NOT AT ALL
5. POOR APPETITE OR OVEREATING: NOT AT ALL
8. MOVING OR SPEAKING SO SLOWLY THAT OTHER PEOPLE COULD HAVE NOTICED. OR THE OPPOSITE, BEING SO FIGETY OR RESTLESS THAT YOU HAVE BEEN MOVING AROUND A LOT MORE THAN USUAL: NOT AT ALL
SUM OF ALL RESPONSES TO PHQ9 QUESTIONS 1 & 2: 2
2. FEELING DOWN, DEPRESSED OR HOPELESS: SEVERAL DAYS
SUM OF ALL RESPONSES TO PHQ QUESTIONS 1-9: 8
10. IF YOU CHECKED OFF ANY PROBLEMS, HOW DIFFICULT HAVE THESE PROBLEMS MADE IT FOR YOU TO DO YOUR WORK, TAKE CARE OF THINGS AT HOME, OR GET ALONG WITH OTHER PEOPLE: NOT DIFFICULT AT ALL
9. THOUGHTS THAT YOU WOULD BE BETTER OFF DEAD, OR OF HURTING YOURSELF: NOT AT ALL
SUM OF ALL RESPONSES TO PHQ QUESTIONS 1-9: 8
SUM OF ALL RESPONSES TO PHQ QUESTIONS 1-9: 8
3. TROUBLE FALLING OR STAYING ASLEEP: NEARLY EVERY DAY

## 2025-02-03 NOTE — PROGRESS NOTES
OFFICE NOTE    2/3/25  Name: Josse Marquez  :1950   Sex:male   Age:74 y.o.      SUBJECTIVE  Chief Complaint   Patient presents with    Lower Back Pain     Shooting down the legs     Fatigue       HPI   Comes in with pain in back occasionally shoots down legs. Golfing bothers him, sitting is comfortable. Sometimes wakes him up at night. Says he is seeing a therapist and remains on prozak, says doesn't feel much different  Review of Systems         Current Outpatient Medications:     celecoxib (CELEBREX) 200 MG capsule, Take 1 capsule by mouth daily, Disp: 90 capsule, Rfl: 1    tiZANidine (ZANAFLEX) 4 MG tablet, Take 1 tablet by mouth nightly as needed (Low back pain), Disp: 10 tablet, Rfl: 0    tiZANidine (ZANAFLEX) 4 MG tablet, Take 1 tablet by mouth at bedtime, Disp: 90 tablet, Rfl: 1    tamsulosin (FLOMAX) 0.4 MG capsule, Take 2 capsules by mouth daily With food, Disp: 180 capsule, Rfl: 1    Red Yeast Rice 600 MG CAPS, Take by mouth, Disp: , Rfl:     Multiple Vitamins-Minerals (CENTRUM SILVER 50+MEN) TABS, Take by mouth, Disp: , Rfl:     FLUoxetine (PROZAC) 20 MG capsule, Take 1 capsule by mouth daily (Patient not taking: Reported on 2024), Disp: , Rfl:   Allergies   Allergen Reactions    Atorvastatin Calcium Other (See Comments)     Leg cramps      Ezetimibe-Simvastatin Other (See Comments)     Leg pain/cramping       No past medical history on file.  No past surgical history on file.  No family history on file.  Social History       Tobacco History       Smoking Status  Some Days Smoking Start Date   Smoking Tobacco Type  Cigars since       Passive Exposure  Never      Smokeless Tobacco Use  Never              Alcohol History       Alcohol Use Status  Not Currently              Drug Use       Drug Use Status  Never              Sexual Activity       Sexually Active  Yes Partners  Female                    OBJECTIVE  Vitals:    25 1459   BP: 128/82   Site: Left Upper Arm

## 2025-02-10 ENCOUNTER — PROCEDURE VISIT (OUTPATIENT)
Dept: PODIATRY | Age: 75
End: 2025-02-10
Payer: MEDICARE

## 2025-02-10 VITALS — WEIGHT: 226 LBS | BODY MASS INDEX: 33.47 KG/M2 | HEIGHT: 69 IN

## 2025-02-10 DIAGNOSIS — I87.2 VENOUS INSUFFICIENCY (CHRONIC) (PERIPHERAL): ICD-10-CM

## 2025-02-10 DIAGNOSIS — R26.2 DIFFICULTY WALKING: ICD-10-CM

## 2025-02-10 DIAGNOSIS — M79.674 PAIN OF TOE OF RIGHT FOOT: ICD-10-CM

## 2025-02-10 DIAGNOSIS — I73.9 PVD (PERIPHERAL VASCULAR DISEASE) (HCC): ICD-10-CM

## 2025-02-10 DIAGNOSIS — B35.1 ONYCHOMYCOSIS: Primary | ICD-10-CM

## 2025-02-10 DIAGNOSIS — M79.675 PAIN OF TOE OF LEFT FOOT: ICD-10-CM

## 2025-02-10 PROCEDURE — 99999 PR OFFICE/OUTPT VISIT,PROCEDURE ONLY: CPT | Performed by: PODIATRIST

## 2025-02-10 PROCEDURE — 11721 DEBRIDE NAIL 6 OR MORE: CPT | Performed by: PODIATRIST

## 2025-02-10 NOTE — PROGRESS NOTES
Patient in today for nail care. Patient does not have any complaints of pain at this time. Patient's PCP is Osmani Fitzpatrick MD date of last ov 2/3/25          Gisella Che LPN

## 2025-02-10 NOTE — PROGRESS NOTES
2/10/25     Josse Marquez    : 1950  Sex: male  Age: 74 y.o.    Subjective:  The patient is seen today for evaluation regarding foot evaluation and mycotic nail care.  No other complaints noted.    Chief Complaint   Patient presents with    Nail Problem     Nail care       Current Medications:    Current Outpatient Medications:     celecoxib (CELEBREX) 200 MG capsule, Take 1 capsule by mouth daily, Disp: 90 capsule, Rfl: 1    tiZANidine (ZANAFLEX) 4 MG tablet, Take 1 tablet by mouth nightly as needed (Low back pain), Disp: 10 tablet, Rfl: 0    tiZANidine (ZANAFLEX) 4 MG tablet, Take 1 tablet by mouth at bedtime, Disp: 90 tablet, Rfl: 1    tamsulosin (FLOMAX) 0.4 MG capsule, Take 2 capsules by mouth daily With food, Disp: 180 capsule, Rfl: 1    Red Yeast Rice 600 MG CAPS, Take by mouth, Disp: , Rfl:     Multiple Vitamins-Minerals (CENTRUM SILVER 50+MEN) TABS, Take by mouth, Disp: , Rfl:     FLUoxetine (PROZAC) 20 MG capsule, Take 1 capsule by mouth daily (Patient not taking: Reported on 2024), Disp: , Rfl:     Allergies:  Allergies   Allergen Reactions    Atorvastatin Calcium Other (See Comments)     Leg cramps      Ezetimibe-Simvastatin Other (See Comments)     Leg pain/cramping       No past surgical history on file.  No past medical history on file.    Vitals:    02/10/25 1037   Weight: 102.5 kg (226 lb)   Height: 1.753 m (5' 9\")       Exam:  Pedal pulses diminished to palpation bilateral foot.  At this time the nail/s 1, 2, 5 right foot and nail/s 1, 2, 5 left foot are noted to be thickened, dystrophic and discolored with subungual debris present.  Tenderness noted to palpation.  Diminished hair growth is noted to both lower extremities.  Edema noted with both varicosities and stasis skin changes present bilaterally.  Coolness is noted to the digital regions to palpation.  Capillary fill time delayed digital areas bilateral foot.  No heel fissuring or macerations of the web spaces.  No plantar

## 2025-03-20 ENCOUNTER — OFFICE VISIT (OUTPATIENT)
Dept: FAMILY MEDICINE CLINIC | Age: 75
End: 2025-03-20

## 2025-03-20 VITALS
BODY MASS INDEX: 34.51 KG/M2 | TEMPERATURE: 97.5 F | SYSTOLIC BLOOD PRESSURE: 138 MMHG | HEIGHT: 69 IN | DIASTOLIC BLOOD PRESSURE: 80 MMHG | HEART RATE: 79 BPM | WEIGHT: 233 LBS | OXYGEN SATURATION: 97 %

## 2025-03-20 DIAGNOSIS — M54.31 SCIATICA, RIGHT SIDE: Primary | ICD-10-CM

## 2025-03-20 RX ORDER — METHYLPREDNISOLONE 4 MG/1
TABLET ORAL
Qty: 1 KIT | Refills: 0 | Status: SHIPPED | OUTPATIENT
Start: 2025-03-20 | End: 2025-03-26

## 2025-03-20 ASSESSMENT — ENCOUNTER SYMPTOMS
BLOOD IN STOOL: 0
COLOR CHANGE: 0
SORE THROAT: 0
TROUBLE SWALLOWING: 0
EYE REDNESS: 0
SINUS PAIN: 0
DIARRHEA: 0
WHEEZING: 0
PHOTOPHOBIA: 0
SHORTNESS OF BREATH: 0
ABDOMINAL PAIN: 0
CONSTIPATION: 0
COUGH: 0
BACK PAIN: 1
VOMITING: 0

## 2025-03-20 NOTE — PROGRESS NOTES
OFFICE NOTE    3/20/25  Name: Josse Marquez  :1950   Sex:male   Age:74 y.o.      SUBJECTIVE  Chief Complaint   Patient presents with    Back Pain     Ongoing,  Pt states no pain with sitting, c/o pain with walking, standing up.  Requesting referral to specialist        HPI     Review of Systems         Current Outpatient Medications:     celecoxib (CELEBREX) 200 MG capsule, Take 1 capsule by mouth daily, Disp: 90 capsule, Rfl: 1    tiZANidine (ZANAFLEX) 4 MG tablet, Take 1 tablet by mouth nightly as needed (Low back pain), Disp: 10 tablet, Rfl: 0    tiZANidine (ZANAFLEX) 4 MG tablet, Take 1 tablet by mouth at bedtime, Disp: 90 tablet, Rfl: 1    tamsulosin (FLOMAX) 0.4 MG capsule, Take 2 capsules by mouth daily With food, Disp: 180 capsule, Rfl: 1    Red Yeast Rice 600 MG CAPS, Take by mouth, Disp: , Rfl:     Multiple Vitamins-Minerals (CENTRUM SILVER 50+MEN) TABS, Take by mouth, Disp: , Rfl:     FLUoxetine (PROZAC) 20 MG capsule, Take 1 capsule by mouth daily (Patient not taking: Reported on 2024), Disp: , Rfl:   Allergies   Allergen Reactions    Atorvastatin Calcium Other (See Comments)     Leg cramps      Ezetimibe-Simvastatin Other (See Comments)     Leg pain/cramping       No past medical history on file.  No past surgical history on file.  No family history on file.  Social History       Tobacco History       Smoking Status  Some Days Smoking Start Date   Smoking Tobacco Type  Cigars since       Passive Exposure  Never      Smokeless Tobacco Use  Never              Alcohol History       Alcohol Use Status  Not Currently              Drug Use       Drug Use Status  Never              Sexual Activity       Sexually Active  Yes Partners  Female                    OBJECTIVE  Vitals:    25 1338   BP: 138/80   BP Site: Left Upper Arm   Patient Position: Sitting   BP Cuff Size: Medium Adult   Pulse: 79   Temp: 97.5 °F (36.4 °C)   SpO2: 97%   Weight: 105.7 kg (233 lb)   Height: 1.753 m 
indicated degenerative disc disease. A follow-up appointment will be scheduled with Dr. Soraya Caban at [facility] for further evaluation of the right sacroiliac sprain and potential degenerative disc disease. He will continue his current regimen of Celebrex 200 mg daily and tizanidine 4 mg at bedtime. A Medrol Dosepak will be provided to alleviate the neuralgia.         No follow-ups on file.    Electronically signed by Osmani Fitzpatrick MD on 3/20/25 at 6:57 PM EDT    Verbal consent obtained from patient to use AI software to assist in note creation..

## 2025-04-01 RX ORDER — CYCLOBENZAPRINE HCL 10 MG
10 TABLET ORAL NIGHTLY PRN
Qty: 30 TABLET | Refills: 0 | Status: SHIPPED | OUTPATIENT
Start: 2025-04-01 | End: 2025-05-01

## 2025-04-14 ENCOUNTER — PROCEDURE VISIT (OUTPATIENT)
Dept: PODIATRY | Age: 75
End: 2025-04-14
Payer: MEDICARE

## 2025-04-14 VITALS — HEIGHT: 69 IN | BODY MASS INDEX: 34.51 KG/M2 | WEIGHT: 233 LBS

## 2025-04-14 DIAGNOSIS — M79.674 PAIN OF TOE OF RIGHT FOOT: ICD-10-CM

## 2025-04-14 DIAGNOSIS — I73.9 PVD (PERIPHERAL VASCULAR DISEASE): ICD-10-CM

## 2025-04-14 DIAGNOSIS — M79.675 PAIN OF TOE OF LEFT FOOT: ICD-10-CM

## 2025-04-14 DIAGNOSIS — B35.1 ONYCHOMYCOSIS: Primary | ICD-10-CM

## 2025-04-14 DIAGNOSIS — R26.2 DIFFICULTY WALKING: ICD-10-CM

## 2025-04-14 DIAGNOSIS — I87.2 VENOUS INSUFFICIENCY (CHRONIC) (PERIPHERAL): ICD-10-CM

## 2025-04-14 PROCEDURE — 99999 PR OFFICE/OUTPT VISIT,PROCEDURE ONLY: CPT | Performed by: PODIATRIST

## 2025-04-14 PROCEDURE — 11721 DEBRIDE NAIL 6 OR MORE: CPT | Performed by: PODIATRIST

## 2025-04-14 NOTE — PROGRESS NOTES
Patient here for nail care. Patient has no new concerns with his feet at this time.  Osmani Fitzpatrick MD last visit 3/20/2025   Electronically signed by Marina Villanueva LPN on 4/14/2025 at 10:59 AM

## 2025-04-14 NOTE — PROGRESS NOTES
25     Josse Marquez    : 1950  Sex: male  Age: 74 y.o.    Subjective:  The patient is seen today for evaluation regarding foot evaluation and mycotic nail care.  No other complaints noted.    Chief Complaint   Patient presents with    Nail Problem     onychomycosis       Current Medications:    Current Outpatient Medications:     cyclobenzaprine (FLEXERIL) 10 MG tablet, Take 1 tablet by mouth nightly as needed for Muscle spasms, Disp: 30 tablet, Rfl: 0    celecoxib (CELEBREX) 200 MG capsule, Take 1 capsule by mouth daily, Disp: 90 capsule, Rfl: 1    tiZANidine (ZANAFLEX) 4 MG tablet, Take 1 tablet by mouth nightly as needed (Low back pain), Disp: 10 tablet, Rfl: 0    FLUoxetine (PROZAC) 20 MG capsule, Take 1 capsule by mouth daily, Disp: , Rfl:     tiZANidine (ZANAFLEX) 4 MG tablet, Take 1 tablet by mouth at bedtime, Disp: 90 tablet, Rfl: 1    tamsulosin (FLOMAX) 0.4 MG capsule, Take 2 capsules by mouth daily With food, Disp: 180 capsule, Rfl: 1    Red Yeast Rice 600 MG CAPS, Take by mouth, Disp: , Rfl:     Multiple Vitamins-Minerals (CENTRUM SILVER 50+MEN) TABS, Take by mouth, Disp: , Rfl:     Allergies:  Allergies   Allergen Reactions    Atorvastatin Calcium Other (See Comments)     Leg cramps      Ezetimibe-Simvastatin Other (See Comments)     Leg pain/cramping       No past surgical history on file.  No past medical history on file.    Vitals:    25 1053   Weight: 105.7 kg (233 lb)   Height: 1.753 m (5' 9\")       Exam:  Pedal pulses diminished to palpation bilateral foot.  At this time the nail/s 1, 2, 5 right foot and nail/s 1, 2, 5 left foot are noted to be thickened, dystrophic and discolored with subungual debris present.  Tenderness noted to palpation.  Diminished hair growth is noted to both lower extremities.  Edema noted with both varicosities and stasis skin changes present bilaterally.  Coolness is noted to the digital regions to palpation.  Capillary fill time delayed digital

## 2025-05-14 ENCOUNTER — OFFICE VISIT (OUTPATIENT)
Age: 75
End: 2025-05-14
Payer: MEDICARE

## 2025-05-14 VITALS
SYSTOLIC BLOOD PRESSURE: 140 MMHG | DIASTOLIC BLOOD PRESSURE: 70 MMHG | BODY MASS INDEX: 33.65 KG/M2 | WEIGHT: 227.2 LBS | HEIGHT: 69 IN

## 2025-05-14 DIAGNOSIS — M51.362 DEGENERATION OF INTERVERTEBRAL DISC OF LUMBAR REGION WITH DISCOGENIC BACK PAIN AND LOWER EXTREMITY PAIN: ICD-10-CM

## 2025-05-14 DIAGNOSIS — M43.16 ANTEROLISTHESIS OF LUMBAR SPINE: ICD-10-CM

## 2025-05-14 DIAGNOSIS — M54.16 LUMBAR RADICULITIS: Primary | ICD-10-CM

## 2025-05-14 DIAGNOSIS — G89.29 CHRONIC BILATERAL LOW BACK PAIN WITH RIGHT-SIDED SCIATICA: ICD-10-CM

## 2025-05-14 DIAGNOSIS — M54.41 CHRONIC BILATERAL LOW BACK PAIN WITH RIGHT-SIDED SCIATICA: ICD-10-CM

## 2025-05-14 PROCEDURE — 99204 OFFICE O/P NEW MOD 45 MIN: CPT | Performed by: STUDENT IN AN ORGANIZED HEALTH CARE EDUCATION/TRAINING PROGRAM

## 2025-05-14 PROCEDURE — 1160F RVW MEDS BY RX/DR IN RCRD: CPT | Performed by: STUDENT IN AN ORGANIZED HEALTH CARE EDUCATION/TRAINING PROGRAM

## 2025-05-14 PROCEDURE — 1123F ACP DISCUSS/DSCN MKR DOCD: CPT | Performed by: STUDENT IN AN ORGANIZED HEALTH CARE EDUCATION/TRAINING PROGRAM

## 2025-05-14 PROCEDURE — 1159F MED LIST DOCD IN RCRD: CPT | Performed by: STUDENT IN AN ORGANIZED HEALTH CARE EDUCATION/TRAINING PROGRAM

## 2025-05-14 RX ORDER — GABAPENTIN 100 MG/1
CAPSULE ORAL
Qty: 90 CAPSULE | Refills: 1 | Status: SHIPPED | OUTPATIENT
Start: 2025-05-14 | End: 2025-06-14

## 2025-05-14 NOTE — PROGRESS NOTES
Kim Caban MD  Physical Medicine & Rehabilitation  [address] [place] [id number]  [phone]     Referred by:  PCP    PCP: Osmani Fitzpatrick MD (General)    Reason for referral: right side sciatica    History of Present Illness:  Josse Marquez is a 74 y.o. male with a history of  has no past medical history on file. presenting today for evaluation of his chief complaint:  low back pain.  External notes/medical records independently reviewed and pertinent information found was incorporated.  History of Present Illness  The patient is a 74-year-old male who presents for evaluation of low back pain.    He reports experiencing difficulty in ambulation upon standing, with pain radiating down his legs, which has been persisting for approximately 5 to 6 months. He does not report any recent falls or injuries but recalls a similar episode a few years ago after walking to his garage, which was managed by a chiropractor. His baseline activity level is high, including golfing and family care, but these activities have been limited due to his current condition. The pain is primarily localized to the right side of his lower back, extending beyond the knee to the posterior aspect of his leg. He reports no numbness or tingling in the leg but expresses concern about potential falls without the use of a cane. He also reports no history of neuropathy, cancer, night sweats, fevers, numbness in the groin, or bowel or bladder abnormalities. He reports that his back pain is worsening and is more bothersome than his leg pain. He has not engaged in recent physical therapy and expresses a dislike for it. He has not tried any nerve pain medication. He has been managing the pain with a brace and long-term use of Celebrex, which provides some relief. He also uses a muscle relaxer, which aids in sleep but does not significantly alleviate the pain. An x-ray of his lower back was performed in November 2024 under the care of Dr. Fitzpatrick.

## 2025-05-23 ENCOUNTER — TELEPHONE (OUTPATIENT)
Dept: PHYSICAL MEDICINE AND REHAB | Age: 75
End: 2025-05-23

## 2025-05-23 NOTE — TELEPHONE ENCOUNTER
Patient spouse called and reports that MRI is scheduled at [facility] Wednesday 5/28 at 2pm.  Reports that he has issues with small spaces and would like something for his nerves. Reports that his wife will be taking him to and from the test. Please advise

## 2025-06-10 ENCOUNTER — RESULTS FOLLOW-UP (OUTPATIENT)
Dept: PHYSICAL MEDICINE AND REHAB | Age: 75
End: 2025-06-10

## 2025-06-10 DIAGNOSIS — M19.022 PRIMARY OSTEOARTHRITIS OF BOTH ELBOWS: ICD-10-CM

## 2025-06-10 DIAGNOSIS — M19.021 PRIMARY OSTEOARTHRITIS OF BOTH ELBOWS: ICD-10-CM

## 2025-06-10 NOTE — TELEPHONE ENCOUNTER
Last Appointment:  3/20/2025  Future Appointments   Date Time Provider Department Center   6/16/2025 10:30 AM Melvin Tran Jr., DPMURALI SAMANIEGO [facility]   6/24/2025  1:00 PM Osmani Fitzpatrick MD [facility] DEP   7/2/2025  8:30 AM Kim Caban MD [facility]

## 2025-06-16 ENCOUNTER — PROCEDURE VISIT (OUTPATIENT)
Dept: PODIATRY | Age: 75
End: 2025-06-16
Payer: MEDICARE

## 2025-06-16 VITALS
WEIGHT: 227 LBS | SYSTOLIC BLOOD PRESSURE: 129 MMHG | TEMPERATURE: 97.6 F | BODY MASS INDEX: 33.52 KG/M2 | DIASTOLIC BLOOD PRESSURE: 82 MMHG

## 2025-06-16 DIAGNOSIS — R26.2 DIFFICULTY WALKING: ICD-10-CM

## 2025-06-16 DIAGNOSIS — I87.2 VENOUS INSUFFICIENCY (CHRONIC) (PERIPHERAL): ICD-10-CM

## 2025-06-16 DIAGNOSIS — B35.1 ONYCHOMYCOSIS: Primary | ICD-10-CM

## 2025-06-16 DIAGNOSIS — I73.9 PVD (PERIPHERAL VASCULAR DISEASE): ICD-10-CM

## 2025-06-16 DIAGNOSIS — M79.674 PAIN OF TOE OF RIGHT FOOT: ICD-10-CM

## 2025-06-16 DIAGNOSIS — M79.675 PAIN OF TOE OF LEFT FOOT: ICD-10-CM

## 2025-06-16 PROCEDURE — 11721 DEBRIDE NAIL 6 OR MORE: CPT | Performed by: PODIATRIST

## 2025-06-16 PROCEDURE — 99999 PR OFFICE/OUTPT VISIT,PROCEDURE ONLY: CPT | Performed by: PODIATRIST

## 2025-06-17 NOTE — PROGRESS NOTES
25     Josse Marquez    : 1950  Sex: male  Age: 74 y.o.    Subjective:  The patient is seen today for evaluation regarding foot evaluation and mycotic nail care.  No other complaints noted.    Chief Complaint   Patient presents with    Toe Pain       Current Medications:    Current Outpatient Medications:     tiZANidine (ZANAFLEX) 4 MG tablet, TAKE ONE (1) TABLET BY MOUTH NIGHTLY AS NEEDED (LOW BACK PAIN), Disp: 30 tablet, Rfl: 2    celecoxib (CELEBREX) 200 MG capsule, Take 1 capsule by mouth daily, Disp: 90 capsule, Rfl: 1    tiZANidine (ZANAFLEX) 4 MG tablet, Take 1 tablet by mouth at bedtime, Disp: 90 tablet, Rfl: 1    tamsulosin (FLOMAX) 0.4 MG capsule, Take 2 capsules by mouth daily With food, Disp: 180 capsule, Rfl: 1    Red Yeast Rice 600 MG CAPS, Take by mouth, Disp: , Rfl:     Multiple Vitamins-Minerals (CENTRUM SILVER 50+MEN) TABS, Take by mouth, Disp: , Rfl:     gabapentin (NEURONTIN) 100 MG capsule, Take 1 tablet by mouth nightly for 7 days, then take 2 tablets by mouth nightly for 7 days, then take 3 tablets by mouth nightly, Disp: 90 capsule, Rfl: 1    Allergies:  Allergies   Allergen Reactions    Atorvastatin Calcium Other (See Comments)     Leg cramps      Ezetimibe-Simvastatin Other (See Comments)     Leg pain/cramping       No past surgical history on file.  No past medical history on file.    Vitals:    25 1042 25 1047   BP: (!) 143/84 129/82   Temp: 97.6 °F (36.4 °C)    TempSrc: Temporal    Weight: 103 kg (227 lb)        Exam:  Pedal pulses diminished to palpation bilateral foot.  At this time the nail/s 1, 2, 5 right foot and nail/s 1, 2, 5 left foot are noted to be thickened, dystrophic and discolored with subungual debris present.  Tenderness noted to palpation.  Minimal hair growth is noted to both lower extremities.  Edematous issues noted with both varicosities and stasis skin changes present bilaterally.  Coolness is noted to the digital regions to palpation.

## 2025-06-19 DIAGNOSIS — R35.0 BENIGN PROSTATIC HYPERPLASIA WITH URINARY FREQUENCY: ICD-10-CM

## 2025-06-19 DIAGNOSIS — M54.42 ACUTE BILATERAL LOW BACK PAIN WITH LEFT-SIDED SCIATICA: ICD-10-CM

## 2025-06-19 DIAGNOSIS — N40.1 BENIGN PROSTATIC HYPERPLASIA WITH URINARY FREQUENCY: ICD-10-CM

## 2025-06-19 RX ORDER — TAMSULOSIN HYDROCHLORIDE 0.4 MG/1
CAPSULE ORAL
Qty: 180 CAPSULE | Refills: 1 | Status: SHIPPED | OUTPATIENT
Start: 2025-06-19

## 2025-06-19 NOTE — TELEPHONE ENCOUNTER
Last Appointment:  3/20/2025  Future Appointments   Date Time Provider Department Center   6/24/2025  1:00 PM Osmani Fitzpatrick MD [facility] DEP   7/2/2025  8:30 AM Kim Caban MD [facility]   8/18/2025 10:30 AM Melvin Tran Jr., DPM [facility] [place] [facility]         yes...

## 2025-06-24 ENCOUNTER — RESULTS FOLLOW-UP (OUTPATIENT)
Dept: FAMILY MEDICINE CLINIC | Age: 75
End: 2025-06-24

## 2025-06-24 ENCOUNTER — OFFICE VISIT (OUTPATIENT)
Dept: FAMILY MEDICINE CLINIC | Age: 75
End: 2025-06-24

## 2025-06-24 VITALS
RESPIRATION RATE: 17 BRPM | OXYGEN SATURATION: 98 % | SYSTOLIC BLOOD PRESSURE: 126 MMHG | TEMPERATURE: 97.3 F | HEIGHT: 69 IN | DIASTOLIC BLOOD PRESSURE: 80 MMHG | WEIGHT: 230 LBS | HEART RATE: 72 BPM | BODY MASS INDEX: 34.07 KG/M2

## 2025-06-24 DIAGNOSIS — E78.49 OTHER HYPERLIPIDEMIA: ICD-10-CM

## 2025-06-24 DIAGNOSIS — Z00.00 MEDICARE ANNUAL WELLNESS VISIT, SUBSEQUENT: ICD-10-CM

## 2025-06-24 DIAGNOSIS — M19.022 PRIMARY OSTEOARTHRITIS OF BOTH ELBOWS: ICD-10-CM

## 2025-06-24 DIAGNOSIS — F32.9 REACTIVE DEPRESSION: ICD-10-CM

## 2025-06-24 DIAGNOSIS — M19.021 PRIMARY OSTEOARTHRITIS OF BOTH ELBOWS: ICD-10-CM

## 2025-06-24 DIAGNOSIS — M25.511 RIGHT SHOULDER PAIN, UNSPECIFIED CHRONICITY: Primary | ICD-10-CM

## 2025-06-24 DIAGNOSIS — E29.1 HYPOGONADISM IN MALE: ICD-10-CM

## 2025-06-24 DIAGNOSIS — Z12.5 SCREENING FOR PROSTATE CANCER: ICD-10-CM

## 2025-06-24 LAB
ALBUMIN: 4.2 G/DL (ref 3.5–5.2)
ALP BLD-CCNC: 77 U/L (ref 40–129)
ALT SERPL-CCNC: 16 U/L (ref 0–50)
ANION GAP SERPL CALCULATED.3IONS-SCNC: 11 MMOL/L (ref 7–16)
AST SERPL-CCNC: 25 U/L (ref 0–50)
BASOPHILS ABSOLUTE: 0.02 K/UL (ref 0–0.2)
BASOPHILS RELATIVE PERCENT: 0 % (ref 0–2)
BILIRUB SERPL-MCNC: 0.5 MG/DL (ref 0–1.2)
BILIRUBIN, URINE: NEGATIVE
BUN BLDV-MCNC: 18 MG/DL (ref 8–23)
CALCIUM SERPL-MCNC: 9.3 MG/DL (ref 8.8–10.2)
CHLORIDE BLD-SCNC: 102 MMOL/L (ref 98–107)
CHOLESTEROL, TOTAL: 253 MG/DL
CO2: 27 MMOL/L (ref 22–29)
COLOR, UA: YELLOW
COMMENT: NORMAL
CREAT SERPL-MCNC: 0.7 MG/DL (ref 0.7–1.2)
EOSINOPHILS ABSOLUTE: 0.07 K/UL (ref 0.05–0.5)
EOSINOPHILS RELATIVE PERCENT: 1 % (ref 0–6)
GFR, ESTIMATED: >90 ML/MIN/1.73M2
GLUCOSE BLD-MCNC: 115 MG/DL (ref 74–99)
GLUCOSE URINE: NEGATIVE MG/DL
HCT VFR BLD CALC: 43.3 % (ref 37–54)
HDLC SERPL-MCNC: 63 MG/DL
HEMOGLOBIN: 14.4 G/DL (ref 12.5–16.5)
IMMATURE GRANULOCYTES %: 0 % (ref 0–5)
IMMATURE GRANULOCYTES ABSOLUTE: <0.03 K/UL (ref 0–0.58)
KETONES, URINE: NEGATIVE MG/DL
LDL CHOLESTEROL: 176 MG/DL
LEUKOCYTE ESTERASE, URINE: NEGATIVE
LYMPHOCYTES ABSOLUTE: 1.33 K/UL (ref 1.5–4)
LYMPHOCYTES RELATIVE PERCENT: 22 % (ref 20–42)
MCH RBC QN AUTO: 30 PG (ref 26–35)
MCHC RBC AUTO-ENTMCNC: 33.3 G/DL (ref 32–34.5)
MCV RBC AUTO: 90.2 FL (ref 80–99.9)
MONOCYTES ABSOLUTE: 0.35 K/UL (ref 0.1–0.95)
MONOCYTES RELATIVE PERCENT: 6 % (ref 2–12)
NEUTROPHILS ABSOLUTE: 4.22 K/UL (ref 1.8–7.3)
NEUTROPHILS RELATIVE PERCENT: 70 % (ref 43–80)
NITRITE, URINE: NEGATIVE
PDW BLD-RTO: 12.5 % (ref 11.5–15)
PH, URINE: 6 (ref 5–8)
PLATELET # BLD: 245 K/UL (ref 130–450)
PMV BLD AUTO: 9.8 FL (ref 7–12)
POTASSIUM SERPL-SCNC: 4.1 MMOL/L (ref 3.5–5.1)
PROSTATE SPECIFIC ANTIGEN: 4.52 NG/ML (ref 0–4)
PROTEIN UA: NEGATIVE MG/DL
RBC # BLD: 4.8 M/UL (ref 3.8–5.8)
SODIUM BLD-SCNC: 140 MMOL/L (ref 136–145)
SPECIFIC GRAVITY UA: 1.02 (ref 1–1.03)
TESTOSTERONE TOTAL: 373 NG/DL (ref 193–740)
TOTAL PROTEIN: 7 G/DL (ref 6.4–8.3)
TRIGL SERPL-MCNC: 67 MG/DL
TSH SERPL DL<=0.05 MIU/L-ACNC: 1.37 UIU/ML (ref 0.27–4.2)
TURBIDITY: CLEAR
URINE HGB: NEGATIVE
UROBILINOGEN, URINE: 0.2 EU/DL (ref 0–1)
VLDLC SERPL CALC-MCNC: 13 MG/DL
WBC # BLD: 6 K/UL (ref 4.5–11.5)

## 2025-06-24 RX ORDER — CELECOXIB 200 MG/1
200 CAPSULE ORAL DAILY
Qty: 90 CAPSULE | Refills: 1 | Status: SHIPPED | OUTPATIENT
Start: 2025-06-24

## 2025-06-24 RX ORDER — CELECOXIB 200 MG/1
200 CAPSULE ORAL DAILY
Qty: 90 CAPSULE | Refills: 1 | Status: SHIPPED | OUTPATIENT
Start: 2025-06-24 | End: 2025-06-24

## 2025-06-24 RX ORDER — GABAPENTIN 400 MG/1
400 CAPSULE ORAL EVERY EVENING
Qty: 30 CAPSULE | Refills: 1 | Status: SHIPPED | OUTPATIENT
Start: 2025-06-24 | End: 2025-08-23

## 2025-06-24 RX ORDER — METHYLPREDNISOLONE ACETATE 40 MG/ML
40 INJECTION, SUSPENSION INTRA-ARTICULAR; INTRALESIONAL; INTRAMUSCULAR; SOFT TISSUE ONCE
Status: COMPLETED | OUTPATIENT
Start: 2025-06-24 | End: 2025-06-24

## 2025-06-24 RX ADMIN — METHYLPREDNISOLONE ACETATE 40 MG: 40 INJECTION, SUSPENSION INTRA-ARTICULAR; INTRALESIONAL; INTRAMUSCULAR; SOFT TISSUE at 14:12

## 2025-06-24 ASSESSMENT — PATIENT HEALTH QUESTIONNAIRE - PHQ9
2. FEELING DOWN, DEPRESSED OR HOPELESS: SEVERAL DAYS
10. IF YOU CHECKED OFF ANY PROBLEMS, HOW DIFFICULT HAVE THESE PROBLEMS MADE IT FOR YOU TO DO YOUR WORK, TAKE CARE OF THINGS AT HOME, OR GET ALONG WITH OTHER PEOPLE: SOMEWHAT DIFFICULT
3. TROUBLE FALLING OR STAYING ASLEEP: NEARLY EVERY DAY
7. TROUBLE CONCENTRATING ON THINGS, SUCH AS READING THE NEWSPAPER OR WATCHING TELEVISION: NOT AT ALL
SUM OF ALL RESPONSES TO PHQ QUESTIONS 1-9: 12
4. FEELING TIRED OR HAVING LITTLE ENERGY: NEARLY EVERY DAY
SUM OF ALL RESPONSES TO PHQ QUESTIONS 1-9: 12
6. FEELING BAD ABOUT YOURSELF - OR THAT YOU ARE A FAILURE OR HAVE LET YOURSELF OR YOUR FAMILY DOWN: SEVERAL DAYS
1. LITTLE INTEREST OR PLEASURE IN DOING THINGS: SEVERAL DAYS
9. THOUGHTS THAT YOU WOULD BE BETTER OFF DEAD, OR OF HURTING YOURSELF: NOT AT ALL
SUM OF ALL RESPONSES TO PHQ QUESTIONS 1-9: 12
SUM OF ALL RESPONSES TO PHQ QUESTIONS 1-9: 12
5. POOR APPETITE OR OVEREATING: NOT AT ALL
8. MOVING OR SPEAKING SO SLOWLY THAT OTHER PEOPLE COULD HAVE NOTICED. OR THE OPPOSITE, BEING SO FIGETY OR RESTLESS THAT YOU HAVE BEEN MOVING AROUND A LOT MORE THAN USUAL: NEARLY EVERY DAY

## 2025-06-24 NOTE — PROGRESS NOTES
1 anterolisthesis, mild arthritis, degenerative disk disease, and moderate spinal stenosis with clumping of nerve roots.  - Gabapentin will be reintroduced into his treatment plan, starting with 400 mg once daily, increasing to twice daily after a week if necessary, and up to three times daily if needed.  - Advised to inform the provider about any side effects.    3. Rotator cuff tendinitis.  - Diagnosed with rotator cuff tendinitis with possible rotator cuff tear.  - Physical exam confirms tenderness in the rotator cuff area.  - A shoulder injection will be administered to alleviate the pain.   Pt supine on exam table.  Anterior rotator cuff area prepped with betadine solution and ETOH swab. Injection with 40 mg methyl pred and 2 cc 1% lidocaine into rotator cuff insertion. Bandaid applied. Codman exercises demonstrated  4. Medicare wellness visit.  - Advised to engage in activities outside the home to improve mental health.  - Blood work will be ordered to monitor overall health status.    5. Hypogonadism.  - Reports low testosterone levels.  - A testosterone level check will be included in the blood work.    PROCEDURE  Procedure: Right shoulder injection    All questions were answered and agreement to proceed was given after the following Pre-Procedure details were reviewed:  - Risks and Benefits: Potential for infection, bleeding, and temporary increase in pain; benefits include pain relief and improved mobility.  - Alternative Options: Physical therapy, oral medications, surgical intervention.  - Side effects: Pain at injection site, temporary numbness, possible allergic reaction.  - Consent: Verbal consent obtained.    Intra-Procedure:  - Time-Out: Confirmed patient's identity, procedure, and site.  - Site Preparation: Cleaned with antiseptic solution.  - Anesthesia: Local anesthetic administered.  - Medication: Corticosteroid injection administered.  - Dressing: Bandage applied to injection

## 2025-06-25 ASSESSMENT — ENCOUNTER SYMPTOMS
ABDOMINAL PAIN: 0
TROUBLE SWALLOWING: 0
SORE THROAT: 0
PHOTOPHOBIA: 0
EYE REDNESS: 0
WHEEZING: 0
SHORTNESS OF BREATH: 0
BACK PAIN: 1
CONSTIPATION: 1
COUGH: 0
BLOOD IN STOOL: 0
COLOR CHANGE: 0
RHINORRHEA: 1

## 2025-07-02 ENCOUNTER — OFFICE VISIT (OUTPATIENT)
Age: 75
End: 2025-07-02

## 2025-07-02 VITALS
DIASTOLIC BLOOD PRESSURE: 80 MMHG | BODY MASS INDEX: 34.07 KG/M2 | WEIGHT: 230 LBS | SYSTOLIC BLOOD PRESSURE: 124 MMHG | HEART RATE: 80 BPM | HEIGHT: 69 IN

## 2025-07-02 DIAGNOSIS — M25.811 IMPINGEMENT OF RIGHT SHOULDER: ICD-10-CM

## 2025-07-02 DIAGNOSIS — M54.41 CHRONIC BILATERAL LOW BACK PAIN WITH RIGHT-SIDED SCIATICA: ICD-10-CM

## 2025-07-02 DIAGNOSIS — G89.29 CHRONIC BILATERAL LOW BACK PAIN WITH RIGHT-SIDED SCIATICA: ICD-10-CM

## 2025-07-02 DIAGNOSIS — M25.511 ACUTE PAIN OF RIGHT SHOULDER: Primary | ICD-10-CM

## 2025-07-02 DIAGNOSIS — M43.16 ANTEROLISTHESIS OF LUMBAR SPINE: ICD-10-CM

## 2025-07-02 DIAGNOSIS — M19.022 PRIMARY OSTEOARTHRITIS OF BOTH ELBOWS: ICD-10-CM

## 2025-07-02 DIAGNOSIS — M54.16 LUMBAR RADICULITIS: ICD-10-CM

## 2025-07-02 DIAGNOSIS — M51.362 DEGENERATION OF INTERVERTEBRAL DISC OF LUMBAR REGION WITH DISCOGENIC BACK PAIN AND LOWER EXTREMITY PAIN: ICD-10-CM

## 2025-07-02 DIAGNOSIS — M19.021 PRIMARY OSTEOARTHRITIS OF BOTH ELBOWS: ICD-10-CM

## 2025-07-02 RX ORDER — GABAPENTIN 400 MG/1
400 CAPSULE ORAL 2 TIMES DAILY
Qty: 60 CAPSULE | Refills: 1 | Status: SHIPPED | OUTPATIENT
Start: 2025-07-02 | End: 2025-08-31

## 2025-07-02 RX ORDER — TRIAMCINOLONE ACETONIDE 40 MG/ML
40 INJECTION, SUSPENSION INTRA-ARTICULAR; INTRAMUSCULAR ONCE
Status: COMPLETED | OUTPATIENT
Start: 2025-07-02 | End: 2025-07-02

## 2025-07-02 RX ORDER — BUPIVACAINE HYDROCHLORIDE 2.5 MG/ML
3 INJECTION, SOLUTION INFILTRATION; PERINEURAL ONCE
Status: COMPLETED | OUTPATIENT
Start: 2025-07-02 | End: 2025-07-02

## 2025-07-02 RX ADMIN — TRIAMCINOLONE ACETONIDE 40 MG: 40 INJECTION, SUSPENSION INTRA-ARTICULAR; INTRAMUSCULAR at 09:16

## 2025-07-02 RX ADMIN — BUPIVACAINE HYDROCHLORIDE 7.5 MG: 2.5 INJECTION, SOLUTION INFILTRATION; PERINEURAL at 09:16

## 2025-07-02 NOTE — PROGRESS NOTES
Bob-Connelly,  +Nee'rs Impingement, -Scarf,  -Drop arm, +Empty Can and -Speeds.+TTP along the right middle trapezius    There is weakness with shoulder abduction and external rotation on the right compared to the left      Lumbar spine  Inspection: Pelvis was symmetric. Lumbar lordotic curvature was decreased. No ecchymoses or erythema.   Palpation: Palpatory exam revealed mild tenderness along right lumbosacral paraspinals, no ttp midline spine, SI joint sulcus, greater trochanters and TFL on bilateral side.   ROM: ROM revealed reduced flexion, extension, left and right side bending  Special/provocative testing:   Seating SLR negative.   negative Segmental tenderness of the lumbar spine.    Hamstrings: + tightness noted bilaterally.   Piriformis: + tightness noted b/l.      NEURO  Reflexes    LEFT RIGHT   Patellar 2+/4 2+/4   Achilles 2+/4 2+/4      Ankle clonus  LEFT RIGHT   No No      Babinski  LEFT RIGHT   - -      Upper Limb Strength - antigravity strength observed in clinic     Lower Limb Strength     LEFT RIGHT   Hip Flexion 4/5 4/5   Knee Extension 5/5 5/5   Knee Flexion 5/5 5/5   Ankle Dorsiflexion 5/5 5/5   1st toe Extension 5/5 5/5   Ankle Plantarflexion 5/5 5/5      Sensation: normal to LT in bilateral lower limbs        Labs/Imaging Reviewed:  Labs and imaging personally reviewed. Relevant diagnostic studies were also reviewed with and explained to patient using their images, reports, diagrams, and/or models.     XR lumbar spine (11/19/24)  HISTORY:  ORDERING SYSTEM PROVIDED HISTORY: Low back pain with sciatica, sciatica  laterality unspecified, unspecified back pain laterality, unspecified  chronicity     FINDINGS:  There is no gross sacral abnormality.  There is very mild degenerative  anterolisthesis of L4 upon L5.  There are multilevel mild lumbar degenerative  disc changes.  The pedicles are intact.  SI joints are normal.  There is no  evidence of spondylolysis.  There is no destructive bony

## 2025-07-02 NOTE — PATIENT INSTRUCTIONS
- Right shoulder injection provided today  - Increase gabapentin to 1 capsule twice daily  - Referral was placed to Pain Management for the back - go get disc at [facility] and take to appointment  - Follow up in 4 weeks  - Shoulder exercises provided today - start after the 4th

## 2025-08-05 ENCOUNTER — OFFICE VISIT (OUTPATIENT)
Dept: FAMILY MEDICINE CLINIC | Age: 75
End: 2025-08-05

## 2025-08-05 VITALS
HEART RATE: 80 BPM | DIASTOLIC BLOOD PRESSURE: 84 MMHG | TEMPERATURE: 97.9 F | HEIGHT: 69 IN | WEIGHT: 223 LBS | OXYGEN SATURATION: 98 % | SYSTOLIC BLOOD PRESSURE: 130 MMHG | BODY MASS INDEX: 33.03 KG/M2

## 2025-08-05 DIAGNOSIS — M51.362 DEGENERATION OF INTERVERTEBRAL DISC OF LUMBAR REGION WITH DISCOGENIC BACK PAIN AND LOWER EXTREMITY PAIN: ICD-10-CM

## 2025-08-05 DIAGNOSIS — M54.42 CHRONIC BILATERAL LOW BACK PAIN WITH LEFT-SIDED SCIATICA: Primary | ICD-10-CM
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
## 2025-08-05 DIAGNOSIS — G89.29 CHRONIC BILATERAL LOW BACK PAIN WITH LEFT-SIDED SCIATICA: Primary | ICD-10-CM

## 2025-08-05 RX ORDER — METHYLPREDNISOLONE 4 MG/1
TABLET ORAL
Qty: 1 KIT | Refills: 0 | Status: SHIPPED | OUTPATIENT
Start: 2025-08-05 | End: 2025-08-11

## 2025-08-26 ENCOUNTER — OFFICE VISIT (OUTPATIENT)
Dept: FAMILY MEDICINE CLINIC | Age: 75
End: 2025-08-26

## 2025-08-26 VITALS
BODY MASS INDEX: 33.03 KG/M2 | HEART RATE: 83 BPM | DIASTOLIC BLOOD PRESSURE: 72 MMHG | WEIGHT: 223 LBS | TEMPERATURE: 97.4 F | SYSTOLIC BLOOD PRESSURE: 130 MMHG | OXYGEN SATURATION: 93 % | HEIGHT: 69 IN

## 2025-08-26 DIAGNOSIS — M75.21 BICIPITAL TENDINITIS, RIGHT SHOULDER: Primary | ICD-10-CM

## 2025-08-26 ASSESSMENT — ENCOUNTER SYMPTOMS
CHEST TIGHTNESS: 0
PHOTOPHOBIA: 0
SORE THROAT: 0
ALLERGIC/IMMUNOLOGIC NEGATIVE: 1
SINUS PAIN: 0
SHORTNESS OF BREATH: 0
EYE REDNESS: 0
VOMITING: 0
BACK PAIN: 0
DIARRHEA: 0
EYE PAIN: 0
NAUSEA: 0
TROUBLE SWALLOWING: 0
COUGH: 0
EYE DISCHARGE: 0
BLOOD IN STOOL: 0
ABDOMINAL PAIN: 0